# Patient Record
Sex: FEMALE | Race: WHITE | NOT HISPANIC OR LATINO | Employment: OTHER | URBAN - METROPOLITAN AREA
[De-identification: names, ages, dates, MRNs, and addresses within clinical notes are randomized per-mention and may not be internally consistent; named-entity substitution may affect disease eponyms.]

---

## 2019-02-19 ENCOUNTER — TRANSCRIBE ORDERS (OUTPATIENT)
Dept: ADMINISTRATIVE | Facility: HOSPITAL | Age: 64
End: 2019-02-19

## 2019-02-19 DIAGNOSIS — R10.31 ABDOMINAL PAIN, RIGHT LOWER QUADRANT: Primary | ICD-10-CM

## 2019-02-22 ENCOUNTER — HOSPITAL ENCOUNTER (OUTPATIENT)
Dept: ULTRASOUND IMAGING | Facility: HOSPITAL | Age: 64
Discharge: HOME | End: 2019-02-22
Payer: COMMERCIAL

## 2019-02-22 DIAGNOSIS — R10.31 ABDOMINAL PAIN, RIGHT LOWER QUADRANT: ICD-10-CM

## 2019-02-22 PROCEDURE — 76830 TRANSVAGINAL US NON-OB: CPT

## 2019-02-22 PROCEDURE — 76856 US EXAM PELVIC COMPLETE: CPT

## 2019-05-14 ENCOUNTER — OFFICE VISIT (OUTPATIENT)
Dept: GASTROENTEROLOGY | Facility: CLINIC | Age: 64
End: 2019-05-14
Payer: COMMERCIAL

## 2019-05-14 VITALS
HEART RATE: 82 BPM | HEIGHT: 68 IN | WEIGHT: 210 LBS | DIASTOLIC BLOOD PRESSURE: 84 MMHG | BODY MASS INDEX: 31.83 KG/M2 | SYSTOLIC BLOOD PRESSURE: 130 MMHG

## 2019-05-14 DIAGNOSIS — K22.70 BARRETT'S ESOPHAGUS WITHOUT DYSPLASIA: ICD-10-CM

## 2019-05-14 DIAGNOSIS — Z86.010 HISTORY OF COLONIC POLYPS: Primary | ICD-10-CM

## 2019-05-14 DIAGNOSIS — K21.9 GASTROESOPHAGEAL REFLUX DISEASE WITHOUT ESOPHAGITIS: ICD-10-CM

## 2019-05-14 DIAGNOSIS — Z86.010 PERSONAL HISTORY OF COLONIC POLYPS: ICD-10-CM

## 2019-05-14 DIAGNOSIS — K74.3 PRIMARY BILIARY CIRRHOSIS (HCC): Primary | ICD-10-CM

## 2019-05-14 PROBLEM — Z86.0100 PERSONAL HISTORY OF COLONIC POLYPS: Status: ACTIVE | Noted: 2019-05-14

## 2019-05-14 PROCEDURE — 99204 OFFICE O/P NEW MOD 45 MIN: CPT | Performed by: INTERNAL MEDICINE

## 2019-05-14 RX ORDER — URSODIOL 500 MG/1
TABLET, FILM COATED ORAL
COMMUNITY
Start: 2019-05-09 | End: 2019-05-14 | Stop reason: SDUPTHER

## 2019-05-14 RX ORDER — OMEPRAZOLE 20 MG/1
20 CAPSULE, DELAYED RELEASE ORAL DAILY
Qty: 30 CAPSULE | Refills: 12 | Status: SHIPPED | OUTPATIENT
Start: 2019-05-14 | End: 2019-06-24

## 2019-05-14 RX ORDER — OMEPRAZOLE 20 MG/1
20 CAPSULE, DELAYED RELEASE ORAL DAILY
Refills: 6 | COMMUNITY
Start: 2019-05-01 | End: 2019-05-14 | Stop reason: SDUPTHER

## 2019-05-14 RX ORDER — URSODIOL 500 MG/1
1000 TABLET, FILM COATED ORAL 2 TIMES DAILY
Qty: 120 TABLET | Refills: 12 | Status: SHIPPED | OUTPATIENT
Start: 2019-05-14 | End: 2020-08-26 | Stop reason: SDUPTHER

## 2019-05-14 RX ORDER — LOSARTAN POTASSIUM AND HYDROCHLOROTHIAZIDE 25; 100 MG/1; MG/1
1 TABLET ORAL DAILY
Refills: 0 | COMMUNITY
Start: 2019-05-01 | End: 2020-12-18

## 2019-05-15 ENCOUNTER — LAB (OUTPATIENT)
Dept: LAB | Facility: HOSPITAL | Age: 64
End: 2019-05-15
Attending: INTERNAL MEDICINE
Payer: COMMERCIAL

## 2019-05-15 DIAGNOSIS — K74.3 PRIMARY BILIARY CIRRHOSIS (HCC): ICD-10-CM

## 2019-05-15 LAB
ALBUMIN SERPL BCP-MCNC: 3.5 G/DL (ref 3.5–5)
ALP SERPL-CCNC: 131 U/L (ref 46–116)
ALT SERPL W P-5'-P-CCNC: 26 U/L (ref 12–78)
ANION GAP SERPL CALCULATED.3IONS-SCNC: 8 MMOL/L (ref 4–13)
AST SERPL W P-5'-P-CCNC: 17 U/L (ref 5–45)
BASOPHILS # BLD AUTO: 0.05 THOUSANDS/ΜL (ref 0–0.1)
BASOPHILS NFR BLD AUTO: 1 % (ref 0–1)
BILIRUB SERPL-MCNC: 0.5 MG/DL (ref 0.2–1)
BUN SERPL-MCNC: 15 MG/DL (ref 5–25)
CALCIUM SERPL-MCNC: 9.3 MG/DL (ref 8.3–10.1)
CHLORIDE SERPL-SCNC: 100 MMOL/L (ref 100–108)
CO2 SERPL-SCNC: 28 MMOL/L (ref 21–32)
CREAT SERPL-MCNC: 0.68 MG/DL (ref 0.6–1.3)
EOSINOPHIL # BLD AUTO: 0.1 THOUSAND/ΜL (ref 0–0.61)
EOSINOPHIL NFR BLD AUTO: 1 % (ref 0–6)
ERYTHROCYTE [DISTWIDTH] IN BLOOD BY AUTOMATED COUNT: 12.9 % (ref 11.6–15.1)
GFR SERPL CREATININE-BSD FRML MDRD: 93 ML/MIN/1.73SQ M
GLUCOSE SERPL-MCNC: 95 MG/DL (ref 65–140)
HCT VFR BLD AUTO: 39 % (ref 34.8–46.1)
HGB BLD-MCNC: 13.1 G/DL (ref 11.5–15.4)
IMM GRANULOCYTES # BLD AUTO: 0.04 THOUSAND/UL (ref 0–0.2)
IMM GRANULOCYTES NFR BLD AUTO: 1 % (ref 0–2)
INR PPP: 1.01 (ref 0.86–1.17)
LYMPHOCYTES # BLD AUTO: 2.5 THOUSANDS/ΜL (ref 0.6–4.47)
LYMPHOCYTES NFR BLD AUTO: 36 % (ref 14–44)
MCH RBC QN AUTO: 31.2 PG (ref 26.8–34.3)
MCHC RBC AUTO-ENTMCNC: 33.6 G/DL (ref 31.4–37.4)
MCV RBC AUTO: 93 FL (ref 82–98)
MONOCYTES # BLD AUTO: 0.57 THOUSAND/ΜL (ref 0.17–1.22)
MONOCYTES NFR BLD AUTO: 8 % (ref 4–12)
NEUTROPHILS # BLD AUTO: 3.65 THOUSANDS/ΜL (ref 1.85–7.62)
NEUTS SEG NFR BLD AUTO: 53 % (ref 43–75)
NRBC BLD AUTO-RTO: 0 /100 WBCS
PLATELET # BLD AUTO: 311 THOUSANDS/UL (ref 149–390)
PMV BLD AUTO: 11 FL (ref 8.9–12.7)
POTASSIUM SERPL-SCNC: 3.7 MMOL/L (ref 3.5–5.3)
PROT SERPL-MCNC: 8.1 G/DL (ref 6.4–8.2)
PROTHROMBIN TIME: 12.7 SECONDS (ref 11.8–14.2)
RBC # BLD AUTO: 4.2 MILLION/UL (ref 3.81–5.12)
SODIUM SERPL-SCNC: 136 MMOL/L (ref 136–145)
WBC # BLD AUTO: 6.91 THOUSAND/UL (ref 4.31–10.16)

## 2019-05-15 PROCEDURE — 80053 COMPREHEN METABOLIC PANEL: CPT

## 2019-05-15 PROCEDURE — 36415 COLL VENOUS BLD VENIPUNCTURE: CPT

## 2019-05-15 PROCEDURE — 85610 PROTHROMBIN TIME: CPT

## 2019-05-15 PROCEDURE — 85025 COMPLETE CBC W/AUTO DIFF WBC: CPT

## 2019-06-24 ENCOUNTER — TELEPHONE (OUTPATIENT)
Dept: GASTROENTEROLOGY | Facility: CLINIC | Age: 64
End: 2019-06-24

## 2019-06-24 DIAGNOSIS — K22.70 BARRETT'S ESOPHAGUS WITHOUT DYSPLASIA: Primary | ICD-10-CM

## 2019-06-24 RX ORDER — PANTOPRAZOLE SODIUM 40 MG/1
40 TABLET, DELAYED RELEASE ORAL DAILY
Qty: 90 TABLET | Refills: 12 | Status: SHIPPED | OUTPATIENT
Start: 2019-06-24 | End: 2020-07-08

## 2020-06-29 DIAGNOSIS — K22.70 BARRETT'S ESOPHAGUS WITHOUT DYSPLASIA: ICD-10-CM

## 2020-07-07 ENCOUNTER — TRANSCRIBE ORDERS (OUTPATIENT)
Dept: ADMINISTRATIVE | Facility: HOSPITAL | Age: 65
End: 2020-07-07

## 2020-07-07 ENCOUNTER — APPOINTMENT (OUTPATIENT)
Dept: LAB | Facility: HOSPITAL | Age: 65
End: 2020-07-07
Payer: COMMERCIAL

## 2020-07-07 DIAGNOSIS — Z00.00 ROUTINE GENERAL MEDICAL EXAMINATION AT A HEALTH CARE FACILITY: Primary | ICD-10-CM

## 2020-07-07 DIAGNOSIS — Z00.00 ROUTINE GENERAL MEDICAL EXAMINATION AT A HEALTH CARE FACILITY: ICD-10-CM

## 2020-07-07 DIAGNOSIS — Z12.31 OTHER SCREENING MAMMOGRAM: ICD-10-CM

## 2020-07-07 LAB
25(OH)D3 SERPL-MCNC: 24.6 NG/ML (ref 30–100)
ALBUMIN SERPL BCP-MCNC: 3.8 G/DL (ref 3.5–5)
ALP SERPL-CCNC: 124 U/L (ref 46–116)
ALT SERPL W P-5'-P-CCNC: 22 U/L (ref 12–78)
ANION GAP SERPL CALCULATED.3IONS-SCNC: 4 MMOL/L (ref 4–13)
AST SERPL W P-5'-P-CCNC: 14 U/L (ref 5–45)
BASOPHILS # BLD AUTO: 0.06 THOUSANDS/ΜL (ref 0–0.1)
BASOPHILS NFR BLD AUTO: 1 % (ref 0–1)
BILIRUB SERPL-MCNC: 0.89 MG/DL (ref 0.2–1)
BUN SERPL-MCNC: 12 MG/DL (ref 5–25)
CALCIUM SERPL-MCNC: 9.7 MG/DL (ref 8.3–10.1)
CHLORIDE SERPL-SCNC: 105 MMOL/L (ref 100–108)
CHOLEST SERPL-MCNC: 206 MG/DL (ref 50–200)
CO2 SERPL-SCNC: 28 MMOL/L (ref 21–32)
CREAT SERPL-MCNC: 0.75 MG/DL (ref 0.6–1.3)
EOSINOPHIL # BLD AUTO: 0.08 THOUSAND/ΜL (ref 0–0.61)
EOSINOPHIL NFR BLD AUTO: 1 % (ref 0–6)
ERYTHROCYTE [DISTWIDTH] IN BLOOD BY AUTOMATED COUNT: 13.1 % (ref 11.6–15.1)
GFR SERPL CREATININE-BSD FRML MDRD: 84 ML/MIN/1.73SQ M
GLUCOSE P FAST SERPL-MCNC: 100 MG/DL (ref 65–99)
HCT VFR BLD AUTO: 40.8 % (ref 34.8–46.1)
HDLC SERPL-MCNC: 67 MG/DL
HGB BLD-MCNC: 13.7 G/DL (ref 11.5–15.4)
IMM GRANULOCYTES # BLD AUTO: 0.05 THOUSAND/UL (ref 0–0.2)
IMM GRANULOCYTES NFR BLD AUTO: 1 % (ref 0–2)
LDLC SERPL CALC-MCNC: 114 MG/DL (ref 0–100)
LYMPHOCYTES # BLD AUTO: 1.9 THOUSANDS/ΜL (ref 0.6–4.47)
LYMPHOCYTES NFR BLD AUTO: 33 % (ref 14–44)
MCH RBC QN AUTO: 31.5 PG (ref 26.8–34.3)
MCHC RBC AUTO-ENTMCNC: 33.6 G/DL (ref 31.4–37.4)
MCV RBC AUTO: 94 FL (ref 82–98)
MONOCYTES # BLD AUTO: 0.43 THOUSAND/ΜL (ref 0.17–1.22)
MONOCYTES NFR BLD AUTO: 8 % (ref 4–12)
NEUTROPHILS # BLD AUTO: 3.18 THOUSANDS/ΜL (ref 1.85–7.62)
NEUTS SEG NFR BLD AUTO: 56 % (ref 43–75)
NONHDLC SERPL-MCNC: 139 MG/DL
NRBC BLD AUTO-RTO: 0 /100 WBCS
PLATELET # BLD AUTO: 325 THOUSANDS/UL (ref 149–390)
PMV BLD AUTO: 11.1 FL (ref 8.9–12.7)
POTASSIUM SERPL-SCNC: 3.8 MMOL/L (ref 3.5–5.3)
PROT SERPL-MCNC: 8.4 G/DL (ref 6.4–8.2)
RBC # BLD AUTO: 4.35 MILLION/UL (ref 3.81–5.12)
SODIUM SERPL-SCNC: 137 MMOL/L (ref 136–145)
TRIGL SERPL-MCNC: 124 MG/DL
WBC # BLD AUTO: 5.7 THOUSAND/UL (ref 4.31–10.16)

## 2020-07-07 PROCEDURE — 80053 COMPREHEN METABOLIC PANEL: CPT

## 2020-07-07 PROCEDURE — 82306 VITAMIN D 25 HYDROXY: CPT

## 2020-07-07 PROCEDURE — 36415 COLL VENOUS BLD VENIPUNCTURE: CPT

## 2020-07-07 PROCEDURE — 80061 LIPID PANEL: CPT

## 2020-07-07 PROCEDURE — 85025 COMPLETE CBC W/AUTO DIFF WBC: CPT

## 2020-07-08 NOTE — TELEPHONE ENCOUNTER
Patient was due for OV 3 months after EGD in 2019  Please contact her to schedule appt  Refill was given for six months

## 2020-07-09 RX ORDER — PANTOPRAZOLE SODIUM 40 MG/1
TABLET, DELAYED RELEASE ORAL
Qty: 30 TABLET | Refills: 5 | Status: SHIPPED | OUTPATIENT
Start: 2020-07-09 | End: 2020-08-26 | Stop reason: SDUPTHER

## 2020-08-25 ENCOUNTER — HOSPITAL ENCOUNTER (OUTPATIENT)
Dept: BONE DENSITY | Facility: IMAGING CENTER | Age: 65
Discharge: HOME | End: 2020-08-25
Payer: COMMERCIAL

## 2020-08-25 VITALS — BODY MASS INDEX: 29.1 KG/M2 | WEIGHT: 192 LBS | HEIGHT: 68 IN

## 2020-08-25 DIAGNOSIS — Z12.31 OTHER SCREENING MAMMOGRAM: ICD-10-CM

## 2020-08-25 PROCEDURE — 77063 BREAST TOMOSYNTHESIS BI: CPT

## 2020-08-25 PROCEDURE — 77067 SCR MAMMO BI INCL CAD: CPT

## 2020-08-26 ENCOUNTER — OFFICE VISIT (OUTPATIENT)
Dept: GASTROENTEROLOGY | Facility: CLINIC | Age: 65
End: 2020-08-26
Payer: COMMERCIAL

## 2020-08-26 VITALS
DIASTOLIC BLOOD PRESSURE: 90 MMHG | TEMPERATURE: 97.7 F | SYSTOLIC BLOOD PRESSURE: 160 MMHG | HEIGHT: 67 IN | BODY MASS INDEX: 30.13 KG/M2 | WEIGHT: 192 LBS

## 2020-08-26 DIAGNOSIS — K74.3 PRIMARY BILIARY CIRRHOSIS (HCC): Primary | ICD-10-CM

## 2020-08-26 DIAGNOSIS — K22.70 BARRETT'S ESOPHAGUS WITHOUT DYSPLASIA: ICD-10-CM

## 2020-08-26 DIAGNOSIS — Z86.010 PERSONAL HISTORY OF COLONIC POLYPS: ICD-10-CM

## 2020-08-26 PROCEDURE — 99214 OFFICE O/P EST MOD 30 MIN: CPT | Performed by: NURSE PRACTITIONER

## 2020-08-26 RX ORDER — URSODIOL 500 MG/1
1000 TABLET, FILM COATED ORAL 2 TIMES DAILY
Qty: 120 TABLET | Refills: 11 | Status: SHIPPED | OUTPATIENT
Start: 2020-08-26 | End: 2021-02-15

## 2020-08-26 RX ORDER — DESVENLAFAXINE 50 MG/1
50 TABLET, EXTENDED RELEASE ORAL DAILY
COMMUNITY

## 2020-08-26 RX ORDER — PANTOPRAZOLE SODIUM 40 MG/1
40 TABLET, DELAYED RELEASE ORAL DAILY
Qty: 30 TABLET | Refills: 11 | Status: SHIPPED | OUTPATIENT
Start: 2020-08-26 | End: 2021-09-02

## 2020-08-26 RX ORDER — IRBESARTAN 150 MG/1
150 TABLET ORAL
COMMUNITY
End: 2022-06-24

## 2020-08-26 NOTE — PROGRESS NOTES
[address] [place] [facility] - Outpatient Follow-up Note  Tabitha Mustafa 76 y o  female MRN: [id number]  Encounter: [id number]    ASSESSMENT AND PLAN:      1  Primary biliary cirrhosis ([address] )  2  Primary biliary cholangitis  Diagnosed 3 years ago when she was residing [place] and  found to have elevated LFTs  In addition to a positive antimitochondrial antibody  Never underwent a liver biopsy  Has been maintained on Actigall since that time  Follows with Dr Caleb Matthews  Most recent blood work was negative with the exception of a mildly elevated alk phosphatase of 124     - ursodiol (ACTIGALL) 500 MG tablet; Take 2 tablets (1,000 mg total) by mouth 2 (two) times a day    Dispense: 120 tablet; Refill: 12  -  Blood work every 6 months  - CBC and differential; Future  - Comprehensive metabolic panel; Future  - Protime-INR; Future  -  Check hepatic ultrasound    3  Morales's esophagus without dysplasia    Up-to-date with surveillance upper endoscopy for history of Morales's esophagus  Last upper endoscopy performed in 2019  Showed the absence of Morales's esophagus  A 3 year recall advised  -  Continue Protonix 40 mg daily    4  Personal history of colonic polyps  5  Colon cancer screening   Increased risk  Up-to-date with surveillance colonoscopy  Last colonoscopy performed in June of 2019 showed 2 adenomatous colon polyps  A 5 year recall advised  Followup Appointment: 1 year  ______________________________________________________________________    Chief Complaint   Patient presents with    Follow-up     needs refill of Ursodial     HPI:      Follow-up to the office regarding the diagnosis of primary biliary cirrhosis and Morales's esophagus  Appetite good  She has lost a few lb intentionally over the past several months  Denies any heartburn on Protonix 40 mg daily    Denies any early satiety, abdominal pain, dysphagia, odynophagia, change in bowel habits, melena, rectal bleeding, fevers or chills  Further denies any skin rashes or lesions, arthralgias or myalgias  Historical Information   Past Medical History:   Diagnosis Date    Morales esophagus     Gastroesophageal reflux disease without esophagitis 5/14/2019    GERD (gastroesophageal reflux disease)     Hypertension     Personal history of colonic polyps 5/14/2019    Primary biliary cirrhosis ([facility] ) 5/14/2019     Past Surgical History:   Procedure Laterality Date    APPENDECTOMY      COLONOSCOPY  06/24/2020     2 adenomas  Five year recall advised    UPPER GASTROINTESTINAL ENDOSCOPY  06/24/2019    irregular z-line    biopsies negative for Morales's     Social History     Substance and Sexual Activity   Alcohol Use Yes    Frequency: 4 or more times a week    Comment: 4 GLASSES OF WINE PER WEEK      Social History     Substance and Sexual Activity   Drug Use Not on file     Social History     Tobacco Use   Smoking Status Never Smoker   Smokeless Tobacco Never Used     Family History   Problem Relation Age of Onset    No Known Problems Mother     Cancer Father     No Known Problems Sister     No Known Problems Daughter     No Known Problems Maternal Grandmother     No Known Problems Maternal Grandfather     No Known Problems Paternal Grandmother     No Known Problems Paternal Grandfather     No Known Problems Sister     No Known Problems Maternal Aunt     No Known Problems Paternal Aunt     No Known Problems Paternal Aunt          Current Outpatient Medications:     desvenlafaxine succinate (PRISTIQ) 50 mg 24 hr tablet    irbesartan (AVAPRO) 150 mg tablet    pantoprazole (PROTONIX) 40 mg tablet    ursodiol (ACTIGALL) 500 MG tablet    losartan-hydrochlorothiazide (HYZAAR) 100-25 MG per tablet    Na Sulfate-K Sulfate-Mg Sulf (SUPREP BOWEL PREP KIT) 17 5-3 13-1 6 GM/177ML SOLN  Allergies   Allergen Reactions    Aspirin      Reviewed medications and allergies and updated as indicated    PHYSICAL EXAM: Blood pressure 160/90, temperature 97 7 °F (36 5 °C), height 5' 7" (1 702 m), weight 87 1 kg (192 lb)  Body mass index is 30 07 kg/m²  General Appearance: NAD, cooperative, alert  Eyes: Anicteric  ENT:  Normocephalic  Neck:  Appears normal  Resp:  Clear to auscultation bilaterally; no rales, rhonchi or wheezing; respirations unlabored   CV:  S1 S2, Regular rate and rhythm; no murmur, rub, or gallop  GI:  Soft, non-tender, non-distended; normal bowel sounds; no masses, no organomegaly   Rectal: Deferred  Musculoskeletal: No cyanosis, clubbing or edema  Normal ROM  Skin:  No jaundice, rashes, or lesions   Psych: Normal affect, good eye contact  Neuro: No gross deficits, AAOx3    Lab Results:   Lab Results   Component Value Date    WBC 5 70 07/07/2020    HGB 13 7 07/07/2020    HCT 40 8 07/07/2020    MCV 94 07/07/2020     07/07/2020     Lab Results   Component Value Date    K 3 8 07/07/2020     07/07/2020    CO2 28 07/07/2020    BUN 12 07/07/2020    CREATININE 0 75 07/07/2020    GLUF 100 (H) 07/07/2020    CALCIUM 9 7 07/07/2020    AST 14 07/07/2020    ALT 22 07/07/2020    ALKPHOS 124 (H) 07/07/2020    EGFR 84 07/07/2020     No results found for: IRON, TIBC, FERRITIN  No results found for: LIPASE    Radiology Results:   No results found

## 2020-09-09 ENCOUNTER — HOSPITAL ENCOUNTER (OUTPATIENT)
Dept: MAMMOGRAPHY | Facility: CLINIC | Age: 65
Discharge: HOME | End: 2020-09-09
Payer: COMMERCIAL

## 2020-09-09 ENCOUNTER — HOSPITAL ENCOUNTER (OUTPATIENT)
Dept: ULTRASOUND IMAGING | Facility: CLINIC | Age: 65
Discharge: HOME | End: 2020-09-09
Payer: COMMERCIAL

## 2020-09-09 VITALS — HEIGHT: 67 IN | TEMPERATURE: 97.3 F | WEIGHT: 192 LBS | BODY MASS INDEX: 30.13 KG/M2

## 2020-09-09 DIAGNOSIS — R92.8 ABNORMAL SCREENING MAMMOGRAM: ICD-10-CM

## 2020-09-09 PROCEDURE — G0279 TOMOSYNTHESIS, MAMMO: HCPCS

## 2020-09-09 PROCEDURE — 77065 DX MAMMO INCL CAD UNI: CPT

## 2020-09-09 PROCEDURE — 76642 ULTRASOUND BREAST LIMITED: CPT

## 2020-09-09 NOTE — PROGRESS NOTES
Met with patient and Dr Evi Jay               regarding recommendation for;      __x___ RIGHT ______LEFT      _____Ultrasound guided  __x____Stereotactic  Breast biopsy  ___x__Verbalized understanding  Blood thinners:  _____yes ___x__no    Date stopped: ___x________    Biopsy teaching sheet given:  ____x___yes ______no    Kyaw Tiffanie has history of GERD, HTN, Primary biliary cirrhosis  Consent form reviewed

## 2020-09-24 ENCOUNTER — HOSPITAL ENCOUNTER (OUTPATIENT)
Dept: MAMMOGRAPHY | Facility: CLINIC | Age: 65
Discharge: HOME | End: 2020-09-24
Payer: COMMERCIAL

## 2020-09-24 VITALS — HEART RATE: 75 BPM | SYSTOLIC BLOOD PRESSURE: 141 MMHG | TEMPERATURE: 96.4 F | DIASTOLIC BLOOD PRESSURE: 82 MMHG

## 2020-09-24 DIAGNOSIS — Z98.890 STATUS POST BREAST BIOPSY: ICD-10-CM

## 2020-09-24 DIAGNOSIS — R92.8 ABNORMAL MAMMOGRAM: ICD-10-CM

## 2020-09-24 PROCEDURE — 88342 IMHCHEM/IMCYTCHM 1ST ANTB: CPT | Performed by: PATHOLOGY

## 2020-09-24 PROCEDURE — 88361 TUMOR IMMUNOHISTOCHEM/COMPUT: CPT | Performed by: PATHOLOGY

## 2020-09-24 PROCEDURE — 88305 TISSUE EXAM BY PATHOLOGIST: CPT | Performed by: PATHOLOGY

## 2020-09-24 PROCEDURE — 19081 BX BREAST 1ST LESION STRTCTC: CPT

## 2020-09-24 RX ORDER — LIDOCAINE HYDROCHLORIDE AND EPINEPHRINE BITARTRATE 20; .01 MG/ML; MG/ML
10 INJECTION, SOLUTION SUBCUTANEOUS ONCE
Status: COMPLETED | OUTPATIENT
Start: 2020-09-24 | End: 2020-09-24

## 2020-09-24 RX ORDER — LIDOCAINE HYDROCHLORIDE 10 MG/ML
5 INJECTION, SOLUTION EPIDURAL; INFILTRATION; INTRACAUDAL; PERINEURAL ONCE
Status: COMPLETED | OUTPATIENT
Start: 2020-09-24 | End: 2020-09-24

## 2020-09-24 RX ADMIN — LIDOCAINE HYDROCHLORIDE AND EPINEPHRINE 10 ML: 20; 10 INJECTION, SOLUTION INFILTRATION; PERINEURAL at 13:34

## 2020-09-24 RX ADMIN — LIDOCAINE HYDROCHLORIDE 5 ML: 10 INJECTION, SOLUTION EPIDURAL; INFILTRATION; INTRACAUDAL; PERINEURAL at 13:31

## 2020-09-24 NOTE — PROGRESS NOTES
Ice pack given:    __x___yes _____no    Discharge instructions signed by patient:    __x___yes _____no    Discharge instructions given to patient:    ___x__yes _____no    Discharged via:    _x____ambulatory    _____wheelchair    _____stretcher    Stable on discharge:    __x___yes ____no

## 2020-09-24 NOTE — PROGRESS NOTES
Procedure type:    _____ultrasound guided __x___stereotactic    Breast:    _____Left ___x__Right    Location: 10 oclcock    Needle: 8g Jeniffer    # of passes: 3 cores (all with calcs)    Clip: Mammomark Triple Twist    Performed by: Dr Luis Hua held for 5 minutes by: Marilia Portillo    Steri Strips:    __x___yes _____no    Band aid:    __x___yes_____no    Tape and guaze:    _____yes _x____no    Tolerated procedure:    __x___yes _____no

## 2020-09-24 NOTE — DISCHARGE INSTR - OTHER ORDERS
POST LARGE CORE BREAST BIOPSY PATIENT INFORMATION      1  Place an ice pack inside your bra over the top of the dressing every hour for 20 minutes (20 minutes on, 60 minutes off)  Do this until bedtime  2  Do not shower or bathe until the following morning  3  You may bathe your breast carefully with the steri-strips in place  Be careful    Not to loosen them  The steri-strips will fall off in 3-5 days  4  You may have mild discomfort, and you may have some bruising where the   Needle entered the skin  This should clear within 5-7 days  5  If you need medicine for discomfort, take acetaminophen products such as   Tylenol  You may also take Advil or Motrin products  6  Do not participate in strenuous activities such as-tennis, aerobics, skiing,  Weight lifting, etc  for 24 hours  Refrain from swimming/soaking for 72 hours  7  Wearing a bra for sleeping may be more comfortable for the first 24-48 hours  8  Watch for continued bleeding, pain or fever over 101; please call with any questions or concerns  For procedures done at the [facility] [address] call:  Aravind Li RN at [facility] RN at [phone]                    *After 4 PM call the Interventional Radiology Department                    [phone] and ask to speak with the nurse on call  For procedures done at the [address] [place] [address] call:         Ray Mosley RN at   *After 4 PM call the Interventional Radiology Department   [phone] and ask to speak with the nurse on call  For procedures done at [address] [place] [address] call: The Radiology Nurse at [phone]  *After 4 PM call your physician, or go to the Emergency Department  9          The final results of your biopsy are usually available within one week

## 2020-09-25 NOTE — PROGRESS NOTES
Post procedure call completed 9/25/20 at 1103    Bleeding: _____yes __X___no    Pain: _____yes ___X___no (minimal discomfort)    Redness/Swelling: ______yes ___X___no    Band aid removed: __X___yes _____no    Steri-Strips intact: ___X___yes _____no    Pt with no questions at this time, adv will call when results available, adv to call with any questions or concerns, has name/# for contact

## 2020-09-29 ENCOUNTER — TELEPHONE (OUTPATIENT)
Dept: MAMMOGRAPHY | Facility: CLINIC | Age: 65
End: 2020-09-29

## 2020-09-29 NOTE — TELEPHONE ENCOUNTER
Call placed to patient after pt given biopsy results from radiologist, questions answered with support of Carissa Ferguson RN, adv next step is to set patient up with surgeon, options discussed and patient would like to research and will call when determined  Pt aware of additional ultrasound request and potential biopsy  Scheduled for Tuesday October 6th at 10am for ultrasound and additional time blocked if needed for biopsy  Pt aware

## 2020-09-30 ENCOUNTER — TELEPHONE (OUTPATIENT)
Dept: SURGICAL ONCOLOGY | Facility: CLINIC | Age: 65
End: 2020-09-30

## 2020-09-30 ENCOUNTER — TELEPHONE (OUTPATIENT)
Dept: MAMMOGRAPHY | Facility: CLINIC | Age: 65
End: 2020-09-30

## 2020-10-05 ENCOUNTER — DOCUMENTATION (OUTPATIENT)
Dept: HEMATOLOGY ONCOLOGY | Facility: CLINIC | Age: 65
End: 2020-10-05

## 2020-10-06 ENCOUNTER — HOSPITAL ENCOUNTER (OUTPATIENT)
Dept: ULTRASOUND IMAGING | Facility: CLINIC | Age: 65
Discharge: HOME | End: 2020-10-06

## 2020-10-06 ENCOUNTER — HOSPITAL ENCOUNTER (OUTPATIENT)
Dept: ULTRASOUND IMAGING | Facility: CLINIC | Age: 65
Discharge: HOME | End: 2020-10-06
Payer: COMMERCIAL

## 2020-10-06 VITALS — HEIGHT: 67 IN | BODY MASS INDEX: 30.13 KG/M2 | TEMPERATURE: 96.9 F | WEIGHT: 192 LBS

## 2020-10-06 DIAGNOSIS — R92.8 ABNORMAL MAMMOGRAM: ICD-10-CM

## 2020-10-06 PROCEDURE — 76642 ULTRASOUND BREAST LIMITED: CPT

## 2020-10-06 RX ORDER — LIDOCAINE HYDROCHLORIDE 10 MG/ML
5 INJECTION, SOLUTION EPIDURAL; INFILTRATION; INTRACAUDAL; PERINEURAL ONCE
Status: DISCONTINUED | OUTPATIENT
Start: 2020-10-06 | End: 2020-10-07 | Stop reason: HOSPADM

## 2020-11-02 PROBLEM — C50.411 MALIGNANT NEOPLASM OF UPPER-OUTER QUADRANT OF RIGHT BREAST IN FEMALE, ESTROGEN RECEPTOR POSITIVE (HCC): Status: ACTIVE | Noted: 2020-11-02

## 2020-11-02 PROBLEM — Z17.0 MALIGNANT NEOPLASM OF UPPER-OUTER QUADRANT OF RIGHT BREAST IN FEMALE, ESTROGEN RECEPTOR POSITIVE (HCC): Status: ACTIVE | Noted: 2020-11-02

## 2020-11-03 ENCOUNTER — TELEPHONE (OUTPATIENT)
Dept: SURGICAL ONCOLOGY | Facility: CLINIC | Age: 65
End: 2020-11-03

## 2020-11-04 ENCOUNTER — CONSULT (OUTPATIENT)
Dept: SURGICAL ONCOLOGY | Facility: CLINIC | Age: 65
End: 2020-11-04
Payer: COMMERCIAL

## 2020-11-04 VITALS
RESPIRATION RATE: 16 BRPM | HEART RATE: 92 BPM | WEIGHT: 195 LBS | SYSTOLIC BLOOD PRESSURE: 144 MMHG | DIASTOLIC BLOOD PRESSURE: 90 MMHG | TEMPERATURE: 98.4 F | BODY MASS INDEX: 30.61 KG/M2 | HEIGHT: 67 IN

## 2020-11-04 DIAGNOSIS — Z17.0 MALIGNANT NEOPLASM OF UPPER-OUTER QUADRANT OF RIGHT BREAST IN FEMALE, ESTROGEN RECEPTOR POSITIVE (HCC): Primary | ICD-10-CM

## 2020-11-04 DIAGNOSIS — C50.411 MALIGNANT NEOPLASM OF UPPER-OUTER QUADRANT OF RIGHT BREAST IN FEMALE, ESTROGEN RECEPTOR POSITIVE (HCC): Primary | ICD-10-CM

## 2020-11-04 PROCEDURE — 99205 OFFICE O/P NEW HI 60 MIN: CPT | Performed by: SURGERY

## 2020-11-04 RX ORDER — LORATADINE AND PSEUDOEPHEDRINE 10; 240 MG/1; MG/1
1 TABLET, EXTENDED RELEASE ORAL AS NEEDED
COMMUNITY

## 2020-11-04 RX ORDER — AZELASTINE HYDROCHLORIDE 137 UG/1
SPRAY, METERED NASAL DAILY PRN
COMMUNITY

## 2020-11-04 RX ORDER — CHOLECALCIFEROL (VITAMIN D3) 125 MCG
CAPSULE ORAL DAILY
COMMUNITY
End: 2022-06-24

## 2020-11-05 ENCOUNTER — DOCUMENTATION (OUTPATIENT)
Dept: HEMATOLOGY ONCOLOGY | Facility: CLINIC | Age: 65
End: 2020-11-05

## 2020-11-09 ENCOUNTER — PATIENT OUTREACH (OUTPATIENT)
Dept: CASE MANAGEMENT | Facility: HOSPITAL | Age: 65
End: 2020-11-09

## 2020-11-10 ENCOUNTER — CLINICAL SUPPORT (OUTPATIENT)
Dept: RADIATION ONCOLOGY | Facility: HOSPITAL | Age: 65
End: 2020-11-10
Attending: RADIOLOGY
Payer: COMMERCIAL

## 2020-11-10 VITALS
DIASTOLIC BLOOD PRESSURE: 71 MMHG | RESPIRATION RATE: 16 BRPM | BODY MASS INDEX: 30.35 KG/M2 | WEIGHT: 193.4 LBS | OXYGEN SATURATION: 96 % | SYSTOLIC BLOOD PRESSURE: 128 MMHG | HEIGHT: 67 IN | TEMPERATURE: 97.6 F | HEART RATE: 86 BPM

## 2020-11-10 DIAGNOSIS — Z17.0 MALIGNANT NEOPLASM OF UPPER-OUTER QUADRANT OF RIGHT BREAST IN FEMALE, ESTROGEN RECEPTOR POSITIVE (HCC): ICD-10-CM

## 2020-11-10 DIAGNOSIS — C50.411 MALIGNANT NEOPLASM OF UPPER-OUTER QUADRANT OF RIGHT BREAST IN FEMALE, ESTROGEN RECEPTOR POSITIVE (HCC): ICD-10-CM

## 2020-11-10 DIAGNOSIS — C50.411 MALIGNANT NEOPLASM OF UPPER-OUTER QUADRANT OF RIGHT BREAST IN FEMALE, ESTROGEN RECEPTOR POSITIVE (HCC): Primary | ICD-10-CM

## 2020-11-10 DIAGNOSIS — Z17.0 MALIGNANT NEOPLASM OF UPPER-OUTER QUADRANT OF RIGHT BREAST IN FEMALE, ESTROGEN RECEPTOR POSITIVE (HCC): Primary | ICD-10-CM

## 2020-11-10 PROCEDURE — 99211 OFF/OP EST MAY X REQ PHY/QHP: CPT | Performed by: RADIOLOGY

## 2020-11-12 ENCOUNTER — DOCUMENTATION (OUTPATIENT)
Dept: HEMATOLOGY ONCOLOGY | Facility: CLINIC | Age: 65
End: 2020-11-12

## 2020-11-13 ENCOUNTER — PATIENT OUTREACH (OUTPATIENT)
Dept: SURGICAL ONCOLOGY | Facility: CLINIC | Age: 65
End: 2020-11-13

## 2020-11-17 ENCOUNTER — PATIENT OUTREACH (OUTPATIENT)
Dept: CASE MANAGEMENT | Facility: HOSPITAL | Age: 65
End: 2020-11-17

## 2020-11-18 ENCOUNTER — PATIENT OUTREACH (OUTPATIENT)
Dept: CASE MANAGEMENT | Facility: HOSPITAL | Age: 65
End: 2020-11-18

## 2020-11-23 ENCOUNTER — PATIENT OUTREACH (OUTPATIENT)
Dept: CASE MANAGEMENT | Facility: HOSPITAL | Age: 65
End: 2020-11-23

## 2020-11-30 ENCOUNTER — TELEPHONE (OUTPATIENT)
Dept: SURGICAL ONCOLOGY | Facility: CLINIC | Age: 65
End: 2020-11-30

## 2020-12-01 ENCOUNTER — PATIENT OUTREACH (OUTPATIENT)
Dept: SURGICAL ONCOLOGY | Facility: CLINIC | Age: 65
End: 2020-12-01

## 2020-12-03 ENCOUNTER — HOSPITAL ENCOUNTER (OUTPATIENT)
Dept: RADIOLOGY | Facility: HOSPITAL | Age: 65
Discharge: HOME | End: 2020-12-03
Attending: SURGERY
Payer: COMMERCIAL

## 2020-12-03 ENCOUNTER — LAB (OUTPATIENT)
Dept: LAB | Facility: CLINIC | Age: 65
End: 2020-12-03
Payer: COMMERCIAL

## 2020-12-03 ENCOUNTER — OFFICE VISIT (OUTPATIENT)
Dept: SURGICAL ONCOLOGY | Facility: CLINIC | Age: 65
End: 2020-12-03
Payer: COMMERCIAL

## 2020-12-03 VITALS
WEIGHT: 191 LBS | RESPIRATION RATE: 16 BRPM | HEIGHT: 67 IN | HEART RATE: 72 BPM | SYSTOLIC BLOOD PRESSURE: 120 MMHG | DIASTOLIC BLOOD PRESSURE: 70 MMHG | BODY MASS INDEX: 29.98 KG/M2 | TEMPERATURE: 97.6 F

## 2020-12-03 DIAGNOSIS — Z01.818 PREOPERATIVE TESTING: ICD-10-CM

## 2020-12-03 DIAGNOSIS — C50.411 MALIGNANT NEOPLASM OF UPPER-OUTER QUADRANT OF RIGHT BREAST IN FEMALE, ESTROGEN RECEPTOR POSITIVE (HCC): ICD-10-CM

## 2020-12-03 DIAGNOSIS — Z17.0 MALIGNANT NEOPLASM OF UPPER-OUTER QUADRANT OF RIGHT BREAST IN FEMALE, ESTROGEN RECEPTOR POSITIVE (HCC): ICD-10-CM

## 2020-12-03 DIAGNOSIS — Z01.818 PREOP EXAMINATION: Primary | ICD-10-CM

## 2020-12-03 LAB
ALBUMIN SERPL BCP-MCNC: 3.8 G/DL (ref 3.5–5)
ALP SERPL-CCNC: 110 U/L (ref 46–116)
ALT SERPL W P-5'-P-CCNC: 27 U/L (ref 12–78)
ANION GAP SERPL CALCULATED.3IONS-SCNC: 9 MMOL/L (ref 4–13)
AST SERPL W P-5'-P-CCNC: 16 U/L (ref 5–45)
ATRIAL RATE: 64 BPM
BASOPHILS # BLD AUTO: 0.06 THOUSANDS/ΜL (ref 0–0.1)
BASOPHILS NFR BLD AUTO: 1 % (ref 0–1)
BILIRUB SERPL-MCNC: 0.55 MG/DL (ref 0.2–1)
BUN SERPL-MCNC: 10 MG/DL (ref 5–25)
CALCIUM SERPL-MCNC: 9.2 MG/DL (ref 8.3–10.1)
CHLORIDE SERPL-SCNC: 101 MMOL/L (ref 100–108)
CO2 SERPL-SCNC: 26 MMOL/L (ref 21–32)
CREAT SERPL-MCNC: 0.72 MG/DL (ref 0.6–1.3)
EOSINOPHIL # BLD AUTO: 0.1 THOUSAND/ΜL (ref 0–0.61)
EOSINOPHIL NFR BLD AUTO: 2 % (ref 0–6)
ERYTHROCYTE [DISTWIDTH] IN BLOOD BY AUTOMATED COUNT: 12.7 % (ref 11.6–15.1)
GFR SERPL CREATININE-BSD FRML MDRD: 88 ML/MIN/1.73SQ M
GLUCOSE SERPL-MCNC: 99 MG/DL (ref 65–140)
HCT VFR BLD AUTO: 39.7 % (ref 34.8–46.1)
HGB BLD-MCNC: 13.1 G/DL (ref 11.5–15.4)
IMM GRANULOCYTES # BLD AUTO: 0.04 THOUSAND/UL (ref 0–0.2)
IMM GRANULOCYTES NFR BLD AUTO: 1 % (ref 0–2)
LYMPHOCYTES # BLD AUTO: 2.38 THOUSANDS/ΜL (ref 0.6–4.47)
LYMPHOCYTES NFR BLD AUTO: 35 % (ref 14–44)
MCH RBC QN AUTO: 31.3 PG (ref 26.8–34.3)
MCHC RBC AUTO-ENTMCNC: 33 G/DL (ref 31.4–37.4)
MCV RBC AUTO: 95 FL (ref 82–98)
MONOCYTES # BLD AUTO: 0.48 THOUSAND/ΜL (ref 0.17–1.22)
MONOCYTES NFR BLD AUTO: 7 % (ref 4–12)
NEUTROPHILS # BLD AUTO: 3.75 THOUSANDS/ΜL (ref 1.85–7.62)
NEUTS SEG NFR BLD AUTO: 54 % (ref 43–75)
NRBC BLD AUTO-RTO: 0 /100 WBCS
P AXIS: 50 DEGREES
PLATELET # BLD AUTO: 302 THOUSANDS/UL (ref 149–390)
PMV BLD AUTO: 10.5 FL (ref 8.9–12.7)
POTASSIUM SERPL-SCNC: 3.8 MMOL/L (ref 3.5–5.3)
PR INTERVAL: 140 MS
PROT SERPL-MCNC: 8 G/DL (ref 6.4–8.2)
QRS AXIS: 31 DEGREES
QRSD INTERVAL: 84 MS
QT INTERVAL: 414 MS
QTC INTERVAL: 427 MS
RBC # BLD AUTO: 4.18 MILLION/UL (ref 3.81–5.12)
SODIUM SERPL-SCNC: 136 MMOL/L (ref 136–145)
T WAVE AXIS: 48 DEGREES
VENTRICULAR RATE: 64 BPM
WBC # BLD AUTO: 6.81 THOUSAND/UL (ref 4.31–10.16)

## 2020-12-03 PROCEDURE — 93010 ELECTROCARDIOGRAM REPORT: CPT | Performed by: INTERNAL MEDICINE

## 2020-12-03 PROCEDURE — 71046 X-RAY EXAM CHEST 2 VIEWS: CPT

## 2020-12-03 PROCEDURE — 99214 OFFICE O/P EST MOD 30 MIN: CPT | Performed by: SURGERY

## 2020-12-03 PROCEDURE — 85025 COMPLETE CBC W/AUTO DIFF WBC: CPT

## 2020-12-03 PROCEDURE — 93005 ELECTROCARDIOGRAM TRACING: CPT

## 2020-12-03 PROCEDURE — 80053 COMPREHEN METABOLIC PANEL: CPT

## 2020-12-03 PROCEDURE — 36415 COLL VENOUS BLD VENIPUNCTURE: CPT

## 2020-12-03 RX ORDER — OXYCODONE HYDROCHLORIDE AND ACETAMINOPHEN 5; 325 MG/1; MG/1
1 TABLET ORAL EVERY 6 HOURS PRN
Qty: 6 TABLET | Refills: 0 | Status: SHIPPED | OUTPATIENT
Start: 2020-12-03 | End: 2021-01-15 | Stop reason: ALTCHOICE

## 2020-12-03 NOTE — H&P (VIEW-ONLY)
Surgical Oncology Follow Up       [address] [facility],[address] [facility]  [facility] [place]  [address] [facility] [address]  [facility] [address]    Marito Ta  1955  [id number]  [address] [facility],[address] [place] [address]  [facility] [place]  [address] [address]    Chief Complaint   Patient presents with    Pre-op Exam          Assessment & Plan:   Patient presents for a follow-up visit  She she has seen Dr Sneha Bowman who has agreed for intraoperative radiation therapy  We previously talked about a right breast needle localization lumpectomy in conjunction with sentinel lymph node biopsy and possible axillary dissection  We went through the process of informed consent for adding intraoperative radiation therapy to this procedure  I reviewed with her procedure in detail as well as her   All questions were answered to their satisfaction  Cancer History:     Oncology History   Malignant neoplasm of upper-outer quadrant of right breast in female, estrogen receptor positive ([facility] [address] )   9/24/2020 Biopsy    Right breast stereotactic biopsy  10 o'clock,   Mucinous carcinoma, 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, both 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, 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  Grade 1    IA 25  HER2 1+  Lymphovascular invasion not identified    Concordant  Malignancy appears unifocal; calcifications span 2 3cm  US right axila has not been performed  Left breast US recommended  10/6/2020 B/L US - no right axillary adenopathy, benign findings in left breast            Interval History:   Patient has no complaints referable to her breast or changes to her overall health since we saw her last     Review of Systems:   Review of Systems   All other systems reviewed and are negative        Past Medical History     Patient Active Problem List   Diagnosis    Personal history of colonic polyps    Gastroesophageal reflux disease without esophagitis    Primary biliary cirrhosis ([facility] [address] )  Morales's esophagus without dysplasia    Malignant neoplasm of upper-outer quadrant of right breast in female, estrogen receptor positive ([facility] )     Past Medical History:   Diagnosis Date    Morales esophagus     Gastroesophageal reflux disease without esophagitis 5/14/2019    GERD (gastroesophageal reflux disease)     Hypertension     Personal history of colonic polyps 5/14/2019    Primary biliary cirrhosis ([facility] ) 5/14/2019     Past Surgical History:   Procedure Laterality Date    APPENDECTOMY      COLONOSCOPY  06/24/2020     2 adenomas  Five year recall advised    MAMMO STEREOTACTIC BREAST BIOPSY RIGHT (ALL INC) Right 9/24/2020    TUBAL LIGATION      UPPER GASTROINTESTINAL ENDOSCOPY  06/24/2019    irregular z-line    biopsies negative for Morales's     Family History   Problem Relation Age of Onset    No Known Problems Mother     Lung cancer Father 48    No Known Problems Sister     No Known Problems Brother     No Known Problems Daughter     No Known Problems Maternal Grandmother     No Known Problems Maternal Grandfather     No Known Problems Paternal Grandmother     No Known Problems Paternal Grandfather     No Known Problems Sister     No Known Problems Maternal Aunt     No Known Problems Paternal Aunt     No Known Problems Paternal Aunt      Social History     Socioeconomic History    Marital status: /Civil Union     Spouse name: Not on file    Number of children: Not on file    Years of education: Not on file    Highest education level: Not on file   Occupational History    Not on file   Social Needs    Financial resource strain: Not on file    Food insecurity     Worry: Not on file     Inability: Not on file   [employer] needs     Medical: Not on file     Non-medical: Not on file   Tobacco Use    Smoking status: Never Smoker    Smokeless tobacco: Never Used   Substance and Sexual Activity    Alcohol use: Yes     Frequency: 2-3 times a week    Drug use: Never    Sexual activity: Not on file   Lifestyle    Physical activity     Days per week: Not on file     Minutes per session: Not on file    Stress: Not on file   Relationships    Social connections     Talks on phone: Not on file     Gets together: Not on file     Attends [employer] service: Not on file     Active member of club or organization: Not on file     Attends meetings of clubs or organizations: Not on file     Relationship status: Not on file    Intimate partner violence     Fear of current or ex partner: Not on file     Emotionally abused: Not on file     Physically abused: Not on file     Forced sexual activity: Not on file   Other Topics Concern    Not on file   Social History Narrative    Not on file       Current Outpatient Medications:     Azelastine HCl 137 MCG/SPRAY SOLN, into each nostril daily as needed , Disp: , Rfl:     Bacillus Coagulans-Inulin (Probiotic-Prebiotic) 1-250 BILLION-MG CAPS, Take by mouth daily, Disp: , Rfl:     Calcium Carbonate-Vitamin D (Calcium 500 + D) 500-125 MG-UNIT TABS, Take by mouth daily, Disp: , Rfl:     desvenlafaxine succinate (PRISTIQ) 50 mg 24 hr tablet, Take 25 mg by mouth daily, Disp: , Rfl:     irbesartan (AVAPRO) 150 mg tablet, Take 150 mg by mouth daily at bedtime, Disp: , Rfl:     loratadine-pseudoephedrine (CLARITIN-D 24-HOUR)  mg per 24 hr tablet, Take 1 tablet by mouth as needed for allergies, Disp: , Rfl:     losartan-hydrochlorothiazide (HYZAAR) 100-25 MG per tablet, Take 1 tablet by mouth daily, Disp: , Rfl: 0    Multiple Vitamins-Minerals (MULTIVITAMIN WOMEN 50+ PO), Take by mouth daily , Disp: , Rfl:     pantoprazole (PROTONIX) 40 mg tablet, Take 1 tablet (40 mg total) by mouth daily, Disp: 30 tablet, Rfl: 11    Turmeric Curcumin 500 MG CAPS, Take by mouth daily, Disp: , Rfl:     ursodiol (ACTIGALL) 500 MG tablet, Take 2 tablets (1,000 mg total) by mouth 2 (two) times a day, Disp: 120 tablet, Rfl: 11    Na Sulfate-K Sulfate-Mg Sulf (SUPREP BOWEL PREP KIT) 17 5-3 13-1 6 GM/177ML SOLN, Use as directed (Patient not taking: Reported on 8/26/2020), Disp: 2 Bottle, Rfl: 0  Allergies   Allergen Reactions    Aspirin GI Intolerance       Physical Exam:     Vitals:    12/03/20 0928   BP: 120/70   Pulse: 72   Resp: 16   Temp: 97 6 °F (36 4 °C)     Physical Exam  Vitals signs reviewed  Exam conducted with a chaperone present  Constitutional:       Appearance: She is well-developed  HENT:      Head: Normocephalic and atraumatic  Eyes:      Pupils: Pupils are equal, round, and reactive to light  Neck:      Musculoskeletal: Normal range of motion and neck supple  Thyroid: No thyromegaly  Vascular: No JVD  Trachea: No tracheal deviation  Cardiovascular:      Rate and Rhythm: Normal rate and regular rhythm  Heart sounds: Normal heart sounds  No murmur  No friction rub  No gallop  Pulmonary:      Effort: Pulmonary effort is normal  No respiratory distress  Breath sounds: Normal breath sounds  No wheezing or rales  Chest:      Breasts: Breasts are symmetrical          Right: No inverted nipple, mass, nipple discharge, skin change or tenderness  Left: No inverted nipple, mass, nipple discharge, skin change or tenderness  Comments: The right breast was examined in the sitting and supine position  There are no worrisome skin changes, tenderness, inverted nipple, nipple discharge, swelling, bleeding or evidence of a mass in any quadrant  May survey demonstrated no evidence of any clinically suspicious axillary, pectoral or paraclavicular lymph nodes  Abdominal:      General: There is no distension  Palpations: Abdomen is soft  There is no hepatomegaly or mass  Tenderness: There is no abdominal tenderness  There is no guarding or rebound  Musculoskeletal: Normal range of motion  General: No tenderness  Lymphadenopathy:      Cervical: No cervical adenopathy     Skin: General: Skin is warm and dry  Findings: No erythema or rash  Neurological:      Mental Status: She is alert and oriented to person, place, and time  Cranial Nerves: No cranial nerve deficit  Psychiatric:         Behavior: Behavior normal            Results & Discussion:   The patient has 2 small foci of mucinous carcinoma each measuring approximately 1 mm in a background of ductal carcinoma in situ measuring approximately 2 3 cm  Radiation Oncology and I both agree she is a good candidate for intraoperative radiation therapy  I reviewed this procedure with the patient  The patient and I underwent the process of consent for right breast needle localization, right breast lumpectomy, lymphatic mapping using blue and radioactive dye, sentinel lymph node biopsy, possible axillary dissection, possible intraoperative radiation therapy  The complications outlined on the consent including relatively minor problems (wound infection, wound healing problems, hematomas, scarring, chronic discomfort/pain), moderate problems (injury to nerves or blood vessels, allergic reactions) and major complications (extensive blood loss requiring transfusions or possible addtional surgeries, cardiac arrest, stroke and possible death)  We also reviewed specific complications as outlined on the consent form including possible cancer recurrence, possible need for additional radiation therapy, probable need for adjuvant therapies unlikely but possible chance for lymphedema  All questions were answered to the patient's satisfaction  We will coordinate the surgery at our next mutual convience  Advance Care Planning/Advance Directives:  I discussed the disease status, treatment plans and follow-up with the patient

## 2020-12-10 ENCOUNTER — DOCUMENTATION (OUTPATIENT)
Dept: HEMATOLOGY ONCOLOGY | Facility: CLINIC | Age: 65
End: 2020-12-10

## 2020-12-18 NOTE — PRE-PROCEDURE INSTRUCTIONS
Pre-Surgery Instructions:   Medication Instructions    Azelastine HCl 137 MCG/SPRAY SOLN Instructed to continue taking up to and including DOS   Bacillus Coagulans-Inulin (Probiotic-Prebiotic) 1-250 BILLION-MG CAPS Instructed patient per Anesthesia Guidelines   Calcium Carbonate-Vitamin D (Calcium 500 + D) 500-125 MG-UNIT TABS Instructed patient per Anesthesia Guidelines   desvenlafaxine succinate (PRISTIQ) 50 mg 24 hr tablet Instructed to continue taking up to and including DOS   irbesartan (AVAPRO) 150 mg tablet Instructed to continue taking but do not take DOS   loratadine-pseudoephedrine (CLARITIN-D 24-HOUR)  mg per 24 hr tablet Instructed ok to continue taking as ordered if needed   Multiple Vitamins-Minerals (MULTIVITAMIN WOMEN 50+ PO) Instructed pt per anesthesia guidelines   oxyCODONE-acetaminophen (PERCOCET) 5-325 mg per tablet Pt to start post-op   pantoprazole (PROTONIX) 40 mg tablet Instructed to continue taking up to and including DOS   Turmeric Curcumin 500 MG CAPS Instructed patient per Anesthesia Guidelines   ursodiol (ACTIGALL) 500 MG tablet Instructed to continue taking up to and including DOS  Med list reviewed as above  Instructed per anesthesia guidelines to hold all vitamins/supplements x 1 week which pt has already done  Also instructed no NSAID's, but Tylenol ok  Pt has CHG surgical soap and understands preop showering protocol  Reviewed [facility] covid visitation restrictions and pt verbalizes understanding  Pt verbalizes understanding and compliance of all preop instructions

## 2020-12-21 ENCOUNTER — ANESTHESIA EVENT (OUTPATIENT)
Dept: PERIOP | Facility: HOSPITAL | Age: 65
End: 2020-12-21
Payer: COMMERCIAL

## 2020-12-22 ENCOUNTER — ANESTHESIA (OUTPATIENT)
Dept: PERIOP | Facility: HOSPITAL | Age: 65
End: 2020-12-22
Payer: COMMERCIAL

## 2020-12-22 ENCOUNTER — HOSPITAL ENCOUNTER (OUTPATIENT)
Dept: NUCLEAR MEDICINE | Facility: HOSPITAL | Age: 65
Discharge: HOME | End: 2020-12-22
Attending: SURGERY
Payer: COMMERCIAL

## 2020-12-22 ENCOUNTER — HOSPITAL ENCOUNTER (OUTPATIENT)
Facility: HOSPITAL | Age: 65
Setting detail: OUTPATIENT SURGERY
Discharge: HOME | End: 2020-12-22
Attending: SURGERY | Admitting: SURGERY
Payer: COMMERCIAL

## 2020-12-22 ENCOUNTER — APPOINTMENT (OUTPATIENT)
Dept: MAMMOGRAPHY | Facility: HOSPITAL | Age: 65
End: 2020-12-22
Attending: SURGERY
Payer: COMMERCIAL

## 2020-12-22 ENCOUNTER — HOSPITAL ENCOUNTER (OUTPATIENT)
Dept: MAMMOGRAPHY | Facility: HOSPITAL | Age: 65
Discharge: HOME | End: 2020-12-22
Attending: SURGERY
Payer: COMMERCIAL

## 2020-12-22 ENCOUNTER — APPOINTMENT (OUTPATIENT)
Dept: RADIATION ONCOLOGY | Facility: HOSPITAL | Age: 65
End: 2020-12-22
Attending: RADIOLOGY
Payer: COMMERCIAL

## 2020-12-22 VITALS
WEIGHT: 191 LBS | DIASTOLIC BLOOD PRESSURE: 78 MMHG | HEIGHT: 67 IN | HEART RATE: 80 BPM | SYSTOLIC BLOOD PRESSURE: 133 MMHG | OXYGEN SATURATION: 96 % | TEMPERATURE: 98 F | RESPIRATION RATE: 19 BRPM | BODY MASS INDEX: 29.98 KG/M2

## 2020-12-22 VITALS — HEART RATE: 92 BPM

## 2020-12-22 DIAGNOSIS — C50.411 MALIGNANT NEOPLASM OF UPPER-OUTER QUADRANT OF RIGHT BREAST IN FEMALE, ESTROGEN RECEPTOR POSITIVE (HCC): ICD-10-CM

## 2020-12-22 DIAGNOSIS — Z17.0 MALIGNANT NEOPLASM OF UPPER-OUTER QUADRANT OF RIGHT BREAST IN FEMALE, ESTROGEN RECEPTOR POSITIVE (HCC): ICD-10-CM

## 2020-12-22 PROCEDURE — 38900 IO MAP OF SENT LYMPH NODE: CPT | Performed by: SURGERY

## 2020-12-22 PROCEDURE — 88307 TISSUE EXAM BY PATHOLOGIST: CPT | Performed by: PATHOLOGY

## 2020-12-22 PROCEDURE — 77334 RADIATION TREATMENT AID(S): CPT | Performed by: RADIOLOGY

## 2020-12-22 PROCEDURE — 88305 TISSUE EXAM BY PATHOLOGIST: CPT | Performed by: PATHOLOGY

## 2020-12-22 PROCEDURE — 38792 RA TRACER ID OF SENTINL NODE: CPT | Performed by: SURGERY

## 2020-12-22 PROCEDURE — A9541 TC99M SULFUR COLLOID: HCPCS

## 2020-12-22 PROCEDURE — 77316 BRACHYTX ISODOSE PLAN SIMPLE: CPT | Performed by: RADIOLOGY

## 2020-12-22 PROCEDURE — 77470 SPECIAL RADIATION TREATMENT: CPT | Performed by: RADIOLOGY

## 2020-12-22 PROCEDURE — 77336 RADIATION PHYSICS CONSULT: CPT | Performed by: RADIOLOGY

## 2020-12-22 PROCEDURE — C9726 RXT BREAST APPL PLACE/REMOV: HCPCS | Performed by: RADIOLOGY

## 2020-12-22 PROCEDURE — 38525 BIOPSY/REMOVAL LYMPH NODES: CPT | Performed by: SURGERY

## 2020-12-22 PROCEDURE — 88333 PATH CONSLTJ SURG CYTO XM 1: CPT | Performed by: PATHOLOGY

## 2020-12-22 PROCEDURE — 77424 IO RAD TX DELIVERY BY X-RAY: CPT | Performed by: RADIOLOGY

## 2020-12-22 PROCEDURE — 19281 PERQ DEVICE BREAST 1ST IMAG: CPT

## 2020-12-22 PROCEDURE — 88342 IMHCHEM/IMCYTCHM 1ST ANTB: CPT | Performed by: PATHOLOGY

## 2020-12-22 PROCEDURE — 77290 THER RAD SIMULAJ FIELD CPLX: CPT | Performed by: RADIOLOGY

## 2020-12-22 PROCEDURE — 19281 PERQ DEVICE BREAST 1ST IMAG: CPT | Performed by: SURGERY

## 2020-12-22 PROCEDURE — 77370 RADIATION PHYSICS CONSULT: CPT | Performed by: RADIOLOGY

## 2020-12-22 PROCEDURE — 38792 RA TRACER ID OF SENTINL NODE: CPT

## 2020-12-22 PROCEDURE — 88341 IMHCHEM/IMCYTCHM EA ADD ANTB: CPT | Performed by: PATHOLOGY

## 2020-12-22 PROCEDURE — 19301 PARTIAL MASTECTOMY: CPT | Performed by: SURGERY

## 2020-12-22 RX ORDER — LIDOCAINE HYDROCHLORIDE 10 MG/ML
0.5 INJECTION, SOLUTION EPIDURAL; INFILTRATION; INTRACAUDAL; PERINEURAL ONCE AS NEEDED
Status: DISCONTINUED | OUTPATIENT
Start: 2020-12-22 | End: 2020-12-22 | Stop reason: HOSPADM

## 2020-12-22 RX ORDER — CEFAZOLIN SODIUM 2 G/50ML
2000 SOLUTION INTRAVENOUS ONCE
Status: COMPLETED | OUTPATIENT
Start: 2020-12-22 | End: 2020-12-22

## 2020-12-22 RX ORDER — FENTANYL CITRATE 50 UG/ML
INJECTION, SOLUTION INTRAMUSCULAR; INTRAVENOUS AS NEEDED
Status: DISCONTINUED | OUTPATIENT
Start: 2020-12-22 | End: 2020-12-22

## 2020-12-22 RX ORDER — MIDAZOLAM HYDROCHLORIDE 2 MG/2ML
INJECTION, SOLUTION INTRAMUSCULAR; INTRAVENOUS AS NEEDED
Status: DISCONTINUED | OUTPATIENT
Start: 2020-12-22 | End: 2020-12-22

## 2020-12-22 RX ORDER — OXYCODONE HYDROCHLORIDE AND ACETAMINOPHEN 5; 325 MG/1; MG/1
1 TABLET ORAL EVERY 4 HOURS PRN
Status: DISCONTINUED | OUTPATIENT
Start: 2020-12-22 | End: 2020-12-22 | Stop reason: HOSPADM

## 2020-12-22 RX ORDER — PROPOFOL 10 MG/ML
INJECTION, EMULSION INTRAVENOUS AS NEEDED
Status: DISCONTINUED | OUTPATIENT
Start: 2020-12-22 | End: 2020-12-22

## 2020-12-22 RX ORDER — BUPIVACAINE HYDROCHLORIDE 2.5 MG/ML
INJECTION, SOLUTION EPIDURAL; INFILTRATION; INTRACAUDAL AS NEEDED
Status: DISCONTINUED | OUTPATIENT
Start: 2020-12-22 | End: 2020-12-22 | Stop reason: HOSPADM

## 2020-12-22 RX ORDER — ONDANSETRON 2 MG/ML
4 INJECTION INTRAMUSCULAR; INTRAVENOUS ONCE AS NEEDED
Status: DISCONTINUED | OUTPATIENT
Start: 2020-12-22 | End: 2020-12-22 | Stop reason: HOSPADM

## 2020-12-22 RX ORDER — LIDOCAINE HYDROCHLORIDE 10 MG/ML
INJECTION, SOLUTION EPIDURAL; INFILTRATION; INTRACAUDAL; PERINEURAL AS NEEDED
Status: DISCONTINUED | OUTPATIENT
Start: 2020-12-22 | End: 2020-12-22

## 2020-12-22 RX ORDER — SODIUM CHLORIDE, SODIUM LACTATE, POTASSIUM CHLORIDE, CALCIUM CHLORIDE 600; 310; 30; 20 MG/100ML; MG/100ML; MG/100ML; MG/100ML
125 INJECTION, SOLUTION INTRAVENOUS CONTINUOUS
Status: DISCONTINUED | OUTPATIENT
Start: 2020-12-22 | End: 2020-12-22 | Stop reason: HOSPADM

## 2020-12-22 RX ORDER — ONDANSETRON 2 MG/ML
4 INJECTION INTRAMUSCULAR; INTRAVENOUS EVERY 6 HOURS PRN
Status: DISCONTINUED | OUTPATIENT
Start: 2020-12-22 | End: 2020-12-22 | Stop reason: HOSPADM

## 2020-12-22 RX ORDER — FENTANYL CITRATE/PF 50 MCG/ML
25 SYRINGE (ML) INJECTION
Status: DISCONTINUED | OUTPATIENT
Start: 2020-12-22 | End: 2020-12-22 | Stop reason: HOSPADM

## 2020-12-22 RX ORDER — LIDOCAINE HYDROCHLORIDE 10 MG/ML
5 INJECTION, SOLUTION EPIDURAL; INFILTRATION; INTRACAUDAL; PERINEURAL ONCE
Status: COMPLETED | OUTPATIENT
Start: 2020-12-22 | End: 2020-12-22

## 2020-12-22 RX ORDER — MAGNESIUM HYDROXIDE 1200 MG/15ML
LIQUID ORAL AS NEEDED
Status: DISCONTINUED | OUTPATIENT
Start: 2020-12-22 | End: 2020-12-22 | Stop reason: HOSPADM

## 2020-12-22 RX ORDER — ONDANSETRON 2 MG/ML
INJECTION INTRAMUSCULAR; INTRAVENOUS AS NEEDED
Status: DISCONTINUED | OUTPATIENT
Start: 2020-12-22 | End: 2020-12-22

## 2020-12-22 RX ORDER — HYDROMORPHONE HCL/PF 1 MG/ML
0.4 SYRINGE (ML) INJECTION
Status: DISCONTINUED | OUTPATIENT
Start: 2020-12-22 | End: 2020-12-22 | Stop reason: HOSPADM

## 2020-12-22 RX ORDER — ACETAMINOPHEN 325 MG/1
975 TABLET ORAL EVERY 6 HOURS PRN
Status: DISCONTINUED | OUTPATIENT
Start: 2020-12-22 | End: 2020-12-22 | Stop reason: HOSPADM

## 2020-12-22 RX ORDER — OXYCODONE HYDROCHLORIDE AND ACETAMINOPHEN 5; 325 MG/1; MG/1
1 TABLET ORAL EVERY 4 HOURS PRN
Status: DISCONTINUED | OUTPATIENT
Start: 2020-12-22 | End: 2020-12-22

## 2020-12-22 RX ORDER — PROMETHAZINE HYDROCHLORIDE 25 MG/ML
6.25 INJECTION, SOLUTION INTRAMUSCULAR; INTRAVENOUS
Status: DISCONTINUED | OUTPATIENT
Start: 2020-12-22 | End: 2020-12-22 | Stop reason: HOSPADM

## 2020-12-22 RX ORDER — METOCLOPRAMIDE HYDROCHLORIDE 5 MG/ML
5 INJECTION INTRAMUSCULAR; INTRAVENOUS ONCE AS NEEDED
Status: DISCONTINUED | OUTPATIENT
Start: 2020-12-22 | End: 2020-12-22 | Stop reason: HOSPADM

## 2020-12-22 RX ORDER — HYDROMORPHONE HCL/PF 1 MG/ML
0.5 SYRINGE (ML) INJECTION
Status: DISCONTINUED | OUTPATIENT
Start: 2020-12-22 | End: 2020-12-22 | Stop reason: HOSPADM

## 2020-12-22 RX ORDER — DEXAMETHASONE SODIUM PHOSPHATE 10 MG/ML
INJECTION, SOLUTION INTRAMUSCULAR; INTRAVENOUS AS NEEDED
Status: DISCONTINUED | OUTPATIENT
Start: 2020-12-22 | End: 2020-12-22

## 2020-12-22 RX ORDER — PROPOFOL 10 MG/ML
INJECTION, EMULSION INTRAVENOUS CONTINUOUS PRN
Status: DISCONTINUED | OUTPATIENT
Start: 2020-12-22 | End: 2020-12-22

## 2020-12-22 RX ADMIN — FENTANYL CITRATE 25 MCG: 50 INJECTION, SOLUTION INTRAMUSCULAR; INTRAVENOUS at 17:31

## 2020-12-22 RX ADMIN — ONDANSETRON 4 MG: 2 INJECTION INTRAMUSCULAR; INTRAVENOUS at 18:37

## 2020-12-22 RX ADMIN — PROPOFOL: 10 INJECTION, EMULSION INTRAVENOUS at 17:35

## 2020-12-22 RX ADMIN — FENTANYL CITRATE 25 MCG: 50 INJECTION, SOLUTION INTRAMUSCULAR; INTRAVENOUS at 19:24

## 2020-12-22 RX ADMIN — FENTANYL CITRATE 25 MCG: 50 INJECTION, SOLUTION INTRAMUSCULAR; INTRAVENOUS at 17:04

## 2020-12-22 RX ADMIN — MIDAZOLAM HYDROCHLORIDE 2 MG: 1 INJECTION, SOLUTION INTRAMUSCULAR; INTRAVENOUS at 16:33

## 2020-12-22 RX ADMIN — SODIUM CHLORIDE, SODIUM LACTATE, POTASSIUM CHLORIDE, AND CALCIUM CHLORIDE: .6; .31; .03; .02 INJECTION, SOLUTION INTRAVENOUS at 17:24

## 2020-12-22 RX ADMIN — SODIUM CHLORIDE, SODIUM LACTATE, POTASSIUM CHLORIDE, AND CALCIUM CHLORIDE: .6; .31; .03; .02 INJECTION, SOLUTION INTRAVENOUS at 16:25

## 2020-12-22 RX ADMIN — LIDOCAINE HYDROCHLORIDE 5 ML: 10 INJECTION, SOLUTION EPIDURAL; INFILTRATION; INTRACAUDAL at 13:26

## 2020-12-22 RX ADMIN — FENTANYL CITRATE 25 MCG: 50 INJECTION, SOLUTION INTRAMUSCULAR; INTRAVENOUS at 16:44

## 2020-12-22 RX ADMIN — FENTANYL CITRATE 25 MCG: 50 INJECTION, SOLUTION INTRAMUSCULAR; INTRAVENOUS at 18:58

## 2020-12-22 RX ADMIN — PROPOFOL 100 MCG/KG/MIN: 10 INJECTION, EMULSION INTRAVENOUS at 16:43

## 2020-12-22 RX ADMIN — LIDOCAINE HYDROCHLORIDE 50 MG: 10 INJECTION, SOLUTION EPIDURAL; INFILTRATION; INTRACAUDAL at 16:35

## 2020-12-22 RX ADMIN — DEXAMETHASONE SODIUM PHOSPHATE 4 MG: 10 INJECTION, SOLUTION INTRAMUSCULAR; INTRAVENOUS at 16:39

## 2020-12-22 RX ADMIN — PROPOFOL 160 MG: 10 INJECTION, EMULSION INTRAVENOUS at 16:36

## 2020-12-22 RX ADMIN — PROPOFOL 50 MG: 10 INJECTION, EMULSION INTRAVENOUS at 16:44

## 2020-12-22 RX ADMIN — FENTANYL CITRATE 25 MCG: 50 INJECTION, SOLUTION INTRAMUSCULAR; INTRAVENOUS at 16:56

## 2020-12-22 RX ADMIN — FENTANYL CITRATE 25 MCG: 50 INJECTION, SOLUTION INTRAMUSCULAR; INTRAVENOUS at 18:50

## 2020-12-22 RX ADMIN — FENTANYL CITRATE 25 MCG: 50 INJECTION, SOLUTION INTRAMUSCULAR; INTRAVENOUS at 19:25

## 2020-12-22 RX ADMIN — CEFAZOLIN SODIUM 2000 MG: 2 SOLUTION INTRAVENOUS at 16:33

## 2020-12-22 NOTE — INTERVAL H&P NOTE
H&P reviewed  After examining the patient I find no changes in the patients condition since the H&P had been written      Vitals:    12/22/20 1330   BP: 166/82   Pulse: 77   Resp: 18   Temp: (!) 97 °F (36 1 °C)   SpO2: 98%

## 2020-12-22 NOTE — DISCHARGE INSTRUCTIONS
POST-OPERATIVE BREAST CARE INSTRUCTIONS    Wound Closure/Dressing: Your wound is closed with:   Steri strips-white pieces of tape that hold your incision together along with surgical glue           Dissolvable sutures-underneath the skin    Wound care:     If you have gauze over your wound you may remove it the day after surgery  Leave the Steri-strips on, they will fall off on their own in 1-2 weeks   You may shower using soap and water to clean your wound  Gently pat dry  Drain Care: If you do not have a drain, disregard this section   Visiting Nursing should have been arranged upon discharge from hospital   A nurse will call your home to schedule an initial visit  Please call the number below if you have not received a call within 48 hours of hospital discharge   You may shower with the LUIS A drains  Wash area around the drains with soap and water and pat dry   Record drain outputs on chart given to you at pre op visit and bring that chart to office at post op appt   LUIS A drains can be removed in the office by a nurse either at post op visit OR when drain output is 30 ccs or less in a 24 hour period for three days in a row   If you had plastic surgery or reconstructive surgery, the plastic surgeon will manage the drains  Other:       You can begin wearing your own bra when it feels comfortable   You may resume using deodorant the day after surgery                 Post-op visit:  your post-op visit is scheduled for:  2021 @ 9:30 AM    Call our office at [phone] if you have any  of the followin  Redness, swelling, heat, unusual drainage or heavy bleeding from wound  2  Fever greater than 101 °  3  Pain not relieved by medication

## 2020-12-22 NOTE — OP NOTE
OPERATIVE REPORT  PATIENT NAME: Cici Mcelroy    :  1955  MRN: [id number]  Pt Location: [facility] OR ROOM 03    SURGERY DATE: 2020    Surgeon(s) and Role:     * Meme Lee MD - Primary     * Perla Baum MD - Assisting    Preop Diagnosis:  Malignant neoplasm of upper-outer quadrant of right breast in female, estrogen receptor positive ([facility] [address] ) [C50 411, Z17 0]    Post-Op Diagnosis Codes:     * Malignant neoplasm of upper-outer quadrant of right breast in female, estrogen receptor positive ([facility] [address] ) [C50 411, Z17 0]    Procedure(s) (LRB):  BREAST NEEDLE LOCALIZED LUMPECTOMY (NEEDLE LOC AT 1230) (Right)  SENTINEL LYMPH NODE BIOPSY; LYMPHATIC MAPPING WITH BLUE DYE AND RADIOACTIVE DYE (INJECT AT 1400 BY DR KNOX IN THE OR) (Right)  BREAST INTRAOPERATIVE RADIATION THERAPY (IORT) BY DR SOTO (Right)    Specimen(s):  ID Type Source Tests Collected by Time Destination   1 : RIGHT breast lumpectomy; final lateral margin Tissue Breast, Right TISSUE EXAM Meme Lee MD 2020 1712    2 : RIGHT axilla sentinel lymph node  Tissue Axillary lymph node TISSUE EXAM Meme Lee MD 2020 1713    3 : RIGHT breast lumpectomy  Tissue Breast, Right TISSUE EXAM Meme Lee MD 2020 1721    4 : RIGHT breast lumpectomy; anterior margin Tissue Breast, Right TISSUE EXAM Meme Lee MD 2020 1724    5 : RIGHT breast lumpectomy; inferior margin Tissue Breast, Right TISSUE EXAM Meme Lee MD 2020 1724    6 : RIGHT breast lumpectomy; superior margin Tissue Breast, Right TISSUE EXAM Meme Lee MD 2020 1724    7 : RIGHT breast lumpectomy; medial margin  Tissue Breast, Right TISSUE Anneliese Johnson MD 2020 172        Estimated Blood Loss:   10 mL    Drains:  * No LDAs found *    Anesthesia Type:   General    Operative Indications:  Malignant neoplasm of upper-outer quadrant of right breast in female, estrogen receptor positive ([facility] [address] ) [C50 411, Z17 0]      Operative Findings:  Good specimen radiograph, the sentinel lymph node was a single lymph node which was blue and radioactive in negative on touch prep the patient received a 4 5 cm IORT applicator and 35 minutes of radiation therapy (20 Gy)  Complications:   None    Procedure and Technique:  Procedure and Technique:  The patient was brought to the operation room and placed under general anesthesia  Attention was paid to ensure appropriate padding and correct positioning  The right breast was injected intradermally with 0 2cc of methylene blue followed by technetium sulfur colloid  This area was vigorously massaged to facilitate identification of the sentinel lymph node (SLN)  The breast and axillary region were then prepped and draped in a sterile fashion  I initiated a time-out, identifying the patient, the correct side and the above procedure  All parties agreed with the time out  SLN Biopsy  The previously marked location of the sentinel lymph node was injected with 0 25% Marcaine  A curivilinear incision was made and dissection was taken down into the axillar proper with electrocautery  The ricarda counter was used to help localize the SLN  The sentinel lymph node was identified and removed with electrocautery  SLN Findings  The SLN was blue and radioactive  The lymph node was grossly normal and sent for touch prep which returned a diagnosis of negative  Lumpectomy  The planned incision around the areola was then injected with 0 25% Marcaine  for local anesthesia  The incision was sharply incised  The localizing wire was used to guide the dissection  Superior, inferior, anterior and posterior planes were developed around the localizing wire and the specimen truncated medially with electrocautery just beyond the tip of the wire  The specimen was then inked for orientation purposes  A specimen radiograph was taken in two dimensions and additional margins were removed to optimize complete removal of the tumor    The additional margins were inked on the most distal area  The final lateral margin was skin and the final posterior margin was muscle   All specimens were sent to pathology for permanent analysis  IORT  A prolene purse-string was placed around the superficial tissues  The cavity was measured to estimate the size of the IORT applicator  The 4 5 cm applicator was chosen and placed within the cavity to make sure it was an appropriate fit  The applicator was then placed on the IORT arm, sterilely drapped and repositioned in the cavity  The purse-string was secured  Ultrasound was then used to measure the applicator to skin distances in 4 quadrants  The closest distance was 1 8 cm  This was verified with the Radiation Oncologist   The protective shielding was then placed  We left the operating theater and Radiation Oncology initiated the treatment to deliver 20Gy  After 35 minutes of radiation, we re-entered the room and removed the applicator  Superficial bleeding controlled with electrocautery and the wounds were extensively irrigated  The wounds were closed with two layers, an inner layer of 3-0 vicryl and a subcuticular layer of 4-0 monocryl  Steri-strips were applied       I was present for all critical portions of the procedure    Patient Disposition:  PACU     SIGNATURE: Eloy Espinal MD  DATE: December 22, 2020  TIME: 6:37 PM

## 2020-12-30 ENCOUNTER — TELEPHONE (OUTPATIENT)
Dept: RADIATION ONCOLOGY | Facility: HOSPITAL | Age: 65
End: 2020-12-30

## 2021-01-04 ENCOUNTER — OFFICE VISIT (OUTPATIENT)
Dept: SURGICAL ONCOLOGY | Facility: CLINIC | Age: 66
End: 2021-01-04

## 2021-01-04 VITALS
TEMPERATURE: 97.8 F | WEIGHT: 196 LBS | HEART RATE: 82 BPM | RESPIRATION RATE: 16 BRPM | SYSTOLIC BLOOD PRESSURE: 140 MMHG | DIASTOLIC BLOOD PRESSURE: 90 MMHG | HEIGHT: 67 IN | BODY MASS INDEX: 30.76 KG/M2

## 2021-01-04 DIAGNOSIS — Z17.0 MALIGNANT NEOPLASM OF UPPER-OUTER QUADRANT OF RIGHT BREAST IN FEMALE, ESTROGEN RECEPTOR POSITIVE (HCC): ICD-10-CM

## 2021-01-04 DIAGNOSIS — Z98.890 STATUS POST RIGHT BREAST LUMPECTOMY: ICD-10-CM

## 2021-01-04 DIAGNOSIS — Z71.89 OTHER SPECIFIED COUNSELING: Primary | ICD-10-CM

## 2021-01-04 DIAGNOSIS — C50.411 MALIGNANT NEOPLASM OF UPPER-OUTER QUADRANT OF RIGHT BREAST IN FEMALE, ESTROGEN RECEPTOR POSITIVE (HCC): ICD-10-CM

## 2021-01-04 PROCEDURE — 99024 POSTOP FOLLOW-UP VISIT: CPT | Performed by: SURGERY

## 2021-01-04 NOTE — PROGRESS NOTES
Surgical Oncology Breast Post-Op       [address] [facility],[address]  [facility]  [address]  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, both 1 1 mm, 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  Grade 1    GA 25  HER2 1+  Lymphovascular invasion not identified    Concordant  Malignancy appears unifocal; calcifications span 2 3cm  US right axila has not been performed  Left breast US recommended  10/6/2020 B/L US - no right axillary adenopathy, benign findings in left breast      12/22/2020 Surgery    Right breast needle localized lumpectomy with sentinel lymph node biopsy  Mucinous carcinoma  Grade 1  1 1 mm  Extensive DCIS (at least 4 cm)  DCIS less than 1 mm form lateral posterior lumpectomy margin  0/1 Lymph node  Anatomic/Prognostic Stage IA     12/22/2020 - 12/22/2020 Radiation    IORT     Dr Shavonne Su:   Assessment/Plan    The patient presents for postoperative visit  She had difficulty have an IV placed at the time of her surgery otherwise she has no complaints referable to the surgery  She has no current complaints  I reviewed her pathology with her she has had intraoperative radiation therapy  She has a follow-up appointment with Radiation Oncology  She had a close posterior lateral margin the final lateral margin was negative  The posterior margin was on muscle    I have recommended she see Dr Gonzalo Hernandez for a 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 [address] )    Morales's esophagus without dysplasia    Malignant neoplasm of upper-outer quadrant of right breast in female, estrogen receptor positive ([facility] [address] )     Past Medical History:   Diagnosis Date    Anxiety     Morales esophagus     Gastroesophageal reflux disease without esophagitis 5/14/2019    GERD (gastroesophageal reflux disease)     History of gallstones     Hypertension     Personal history of colonic polyps 5/14/2019    Primary biliary cirrhosis ([facility] [address] ) 5/14/2019    Seasonal allergies      Past Surgical History:   Procedure Laterality Date    APPENDECTOMY      BREAST LUMPECTOMY Right 12/22/2020    Procedure: BREAST NEEDLE LOCALIZED LUMPECTOMY (NEEDLE LOC AT 1230); Surgeon: Amie Roman MD;  [facility];  Service: Surgical Oncology    COLONOSCOPY  06/24/2020     2 adenomas  Five year recall advised    INTRAOPERATIVE RADIATION THERAPY (IORT) Right 12/22/2020    Procedure: BREAST INTRAOPERATIVE RADIATION THERAPY (IORT) BY DR Judy Santos;  Surgeon: Amie Roman MD;  [facility];  Service: Surgical Oncology    LYMPH NODE BIOPSY Right 12/22/2020    Procedure: SENTINEL LYMPH NODE BIOPSY; LYMPHATIC MAPPING WITH BLUE DYE AND RADIOACTIVE DYE (INJECT AT 1400 BY DR KNOX IN THE OR);   Surgeon: Amie Roman MD;  Location: [facility];  Service: Surgical Oncology    MAMMO NEEDLE LOCALIZATION RIGHT (ALL INC) Right 12/22/2020    MAMMO STEREOTACTIC BREAST BIOPSY RIGHT (ALL INC) Right 9/24/2020    TUBAL LIGATION      UPPER GASTROINTESTINAL ENDOSCOPY  06/24/2019    irregular z-line    biopsies negative for Morales's     Family History   Problem Relation Age of Onset    No Known Problems Mother     Lung cancer Father 48    No Known Problems Sister     No Known Problems Brother     No Known Problems Daughter     No Known Problems Maternal Grandmother     No Known Problems Maternal Grandfather     No Known Problems Paternal Grandmother     No Known Problems Paternal Grandfather     No Known Problems Sister     No Known Problems Maternal Aunt     No Known Problems Paternal Aunt     No Known Problems Paternal Aunt      Social History     Socioeconomic History    Marital status: /Civil Union     Spouse name: Not on file    Number of children: Not on file    Years of education: Not on file    Highest education level: Not on file   Occupational History    Not on file   Social Needs    Financial resource strain: Not on file    Food insecurity     Worry: Not on file     Inability: Not on file   [employer] needs     Medical: Not on file     Non-medical: Not on file   Tobacco Use    Smoking status: Never Smoker    Smokeless tobacco: Never Used   Substance and Sexual Activity    Alcohol use: Yes     Frequency: 2-3 times a week     Drinks per session: 1 or 2    Drug use: Never    Sexual activity: Not on file   Lifestyle    Physical activity     Days per week: Not on file     Minutes per session: Not on file    Stress: Not on file   Relationships    Social connections     Talks on phone: Not on file     Gets together: Not on file     Attends [employer] service: Not on file     Active member of club or organization: Not on file     Attends meetings of clubs or organizations: Not on file     Relationship status: Not on file    Intimate partner violence     Fear of current or ex partner: Not on file     Emotionally abused: Not on file     Physically abused: Not on file     Forced sexual activity: Not on file Other Topics Concern    Not on file   Social History Narrative    Not on file       Current Outpatient Medications:     Azelastine HCl 137 MCG/SPRAY SOLN, into each nostril daily as needed , Disp: , Rfl:     Bacillus Coagulans-Inulin (Probiotic-Prebiotic) 1-250 BILLION-MG CAPS, Take by mouth daily, Disp: , Rfl:     Calcium Carbonate-Vitamin D (Calcium 500 + D) 500-125 MG-UNIT TABS, Take by mouth daily, Disp: , Rfl:     desvenlafaxine succinate (PRISTIQ) 50 mg 24 hr tablet, Take 50 mg by mouth daily , Disp: , Rfl:     irbesartan (AVAPRO) 150 mg tablet, Take 150 mg by mouth daily at bedtime, Disp: , Rfl:     loratadine-pseudoephedrine (CLARITIN-D 24-HOUR)  mg per 24 hr tablet, Take 1 tablet by mouth as needed for allergies, Disp: , Rfl:     Multiple Vitamins-Minerals (MULTIVITAMIN WOMEN 50+ PO), Take by mouth daily , Disp: , Rfl:     pantoprazole (PROTONIX) 40 mg tablet, Take 1 tablet (40 mg total) by mouth daily, Disp: 30 tablet, Rfl: 11    Turmeric Curcumin 500 MG CAPS, Take by mouth daily, Disp: , Rfl:     ursodiol (ACTIGALL) 500 MG tablet, Take 2 tablets (1,000 mg total) by mouth 2 (two) times a day, Disp: 120 tablet, Rfl: 11    oxyCODONE-acetaminophen (PERCOCET) 5-325 mg per tablet, Take 1 tablet by mouth every 6 (six) hours as needed for moderate painMax Daily Amount: 4 tablets (Patient not taking: Reported on 1/4/2021), Disp: 6 tablet, Rfl: 0  Allergies   Allergen Reactions    Aspirin GI Intolerance       Physical Exams:     Vitals:    01/04/21 0931   BP: 140/90   Pulse: 82   Resp: 16   Temp: 97 8 °F (36 6 °C)     Physical Exam  Chest:          Comments: Examination the right breast demonstrates no ecchymosis, seroma or hematoma  She has a good cosmetic outcome  Results & Discussion:   The patient is surgery IORT are over  Her margins are negative though the posterior margin was somewhat close this is on muscle  She has a follow-up appoint with Radiation Oncology    She also had a very small 1-2 mm mucinous carcinoma  I will have her see Medical Oncology as well  I will see her back in 3 months  All questions were answered the patient's satisfaction  I have spent 20 minutes with Patient and family today in which greater than 50% of this time was spent in counseling/coordination of care regarding role of intraoperative radiation therapy, clean margins/close margins, role for anti hormonal therapy for invasive cancers

## 2021-01-04 NOTE — TELEPHONE ENCOUNTER
Pt referred to Dr Renny Santoro by Dr Marie Barone  Scheduled for 1/15/21 in [facility]

## 2021-01-15 ENCOUNTER — CONSULT (OUTPATIENT)
Dept: HEMATOLOGY ONCOLOGY | Facility: CLINIC | Age: 66
End: 2021-01-15
Payer: COMMERCIAL

## 2021-01-15 VITALS
DIASTOLIC BLOOD PRESSURE: 72 MMHG | WEIGHT: 192 LBS | HEART RATE: 82 BPM | BODY MASS INDEX: 30.13 KG/M2 | RESPIRATION RATE: 18 BRPM | TEMPERATURE: 98.8 F | OXYGEN SATURATION: 97 % | HEIGHT: 67 IN | SYSTOLIC BLOOD PRESSURE: 126 MMHG

## 2021-01-15 DIAGNOSIS — Z17.0 MALIGNANT NEOPLASM OF UPPER-OUTER QUADRANT OF RIGHT BREAST IN FEMALE, ESTROGEN RECEPTOR POSITIVE (HCC): ICD-10-CM

## 2021-01-15 DIAGNOSIS — C50.411 MALIGNANT NEOPLASM OF UPPER-OUTER QUADRANT OF RIGHT BREAST IN FEMALE, ESTROGEN RECEPTOR POSITIVE (HCC): ICD-10-CM

## 2021-01-15 PROCEDURE — 99205 OFFICE O/P NEW HI 60 MIN: CPT | Performed by: INTERNAL MEDICINE

## 2021-01-15 RX ORDER — ANASTROZOLE 1 MG/1
1 TABLET ORAL DAILY
Qty: 90 TABLET | Refills: 1 | Status: SHIPPED | OUTPATIENT
Start: 2021-01-15 | End: 2021-06-02

## 2021-01-15 NOTE — PROGRESS NOTES
Hematology / Oncology Outpatient Consult Note    Joseph King 76 y o  female [id number]         Date:  1/15/2021    Assessment / Plan:  A 50-year-old postmenopausal woman with newly diagnosed stage I A right breast cancer, grade 1 with mucinous histology, % positive, UT 25% positive, HER2 negative disease  She underwent lumpectomy and sentinel lymph node biopsy, resulting in TAMARA  She presents today with her  to discuss adjuvant treatment options  We had extensive discussion regarding the diagnosis, staging information, low-grade histology, tumor phenotype, very good prognosis and treatment options  Obviously, adjuvant chemotherapy is not indicated due to the low grade histology  I recommended her to have adjuvant hormonal therapy with anastrozole for 5 years  Side effects of anastrozole was thoroughly discussed, including but not limited to hot flashes, musculoskeletal symptom and bone mineral density loss  We also discussed the breast cancer outcome if she chose not to have adjuvant hormonal therapy  She decided to try anastrozole  I recommended her to continue with calcium vitamin-D on a regular basis  I will see her again in 6 months for routine follow-up  Subjective:     HPI:  A 76years old postmenopausal woman who was found to have radiographic abnormality in her right breast for which she underwent biopsy in September 24, 2020  She had invasive mucinous carcinoma, grade 1  This was % positive, UT 25% positive, HER2 negative disease  Subsequently, she underwent lumpectomy and sentinel lymph node biopsy by Dr Cristhian Garcia in December 22, 2020 which showed 2 mm and 1 1 mm of mucinous carcinoma, grade 1  There was no evidence of lymphovascular invasion  1 sentinel lymph node was negative for metastatic disease  She presents today with her  to discuss adjuvant treatment options  She feels well with no pain  She has no respiratory symptoms    Her weight is stable  She has hypertension as well as primary biliary cirrhosis for which she is on ursodil  Her liver function test is normal   She has no family history of breast cancer  She is a lifetime never smoker  Her performance status is normal       Interval History:          Objective:     Primary Diagnosis:    Right breast cancer, stage I A (pT1a, pN0, M0) grade 1, mucinous histology, % positive, VT 25% positive, HER2 negative disease  Diagnosed in December 2020  Cancer Staging:  Cancer Staging  No matching staging information was found for the patient  Previous Hematologic/ Oncologic Treatment:         Current Hematologic/ Oncologic Treatment:      Adjuvant hormonal therapy with anastrozole to be started in January 2021  Disease Status:     TAMARA status post lumpectomy and sentinel lymph node biopsy  Test Results:    Pathology:    2 mm and 1 1 mm of invasive mucinous carcinoma, grade 1  No evidence of lymphovascular invasion  1 sentinel lymph node was negative for metastatic disease  % positive, VT 25% positive, HER2 negative disease  Stage I A (pT1a, pN0, M0)    Radiology:    Chest x-ray was negative for pulmonary disease  Laboratory:    See below  Physical Exam:      General Appearance:    Alert, oriented        Eyes:    PERRL   Ears:    Normal external ear canals, both ears   Nose:   Nares normal, septum midline   Throat:   Mucosa moist  Pharynx without injection  Neck:   Supple       Lungs:     Clear to auscultation bilaterally   Chest Wall:    No tenderness or deformity    Heart:    Regular rate and rhythm       Abdomen:     Soft, non-tender, bowel sounds +, no organomegaly           Extremities:   Extremities no cyanosis or edema       Skin:   no rash or icterus      Lymph nodes:   Cervical, supraclavicular, and axillary nodes normal   Neurologic:   CNII-XII intact, normal strength, sensation and reflexes     Throughout          Breast exam:   Lumpectomy scar at outer upper quadrant of her right breast with no palpable abnormality  Left breast exam is negative  ROS: Review of Systems   All other systems reviewed and are negative  Imaging: Mammo Needle Localization Right ([employer])    Result Date: 12/22/2020  Narrative: MAMMOGRAPHY NEEDLE LOCALIZATION Indication: Right breast carcinoma  Localize stereotactic clip preoperatively Comparison: Prior imaging studies of the left breast Procedure: Informed consent was obtained prior to the procedure, discussing possible complications such as bleeding, infection, or allergic reaction  After verifying patient and side of interest and initialing side of concern (time out procedure), and utilizing digital mammographic guidance and sterile technique, a needle localization procedure of the stereotactic clip in the upper outer quadrant was performed from a lateral approach  The localizing  wire was positioned adjacent to the targeted stereotactic clip  A radiopaque skin marker was placed at the wire entry site  The patient tolerated the procedure well, leaving the department in satisfactory condition, with  guidance images available on PACS  Impression: Impression: Successful needle localization procedure of the targeted stereotactic clip  Workstation performed: [id number]     Nm Sentinal Node Inj    Result Date: 12/22/2020  Narrative: SENTINEL NODE LYMPHOSCINTIGRAPHY INDICATION:  C50 411: Malignant neoplasm of upper-outer quadrant of right female breast Z17 0: Estrogen receptor positive status (ER+)  , localize sentinel node  FINDINGS: 0 38 mCi Tc-99m sulfur colloid (0 6 cc volume) was administered intradermally into the right breast by Dr Mili Jiang in the OR  No imaging was performed  Impression: Injection of  0 38 mCi Tc-99m sulfur colloid into the right breast in the OR   Workstation performed: [id number]     Mammo Breast Specimen Right (no Charge)    Result Date: 12/23/2020  Narrative: MAMMOGRAPHY BREAST SPECIMEN INDICATION: Evaluation of right breast surgical specimen TECHNIQUE: 2 digital images submitted; images marked with respect to specimen margins  FINDINGS: The specimen includes the localizing wire  The stereotactic clip  in question is included in the specimen  Impression: The breast specimen includes the localizing wire and targeted stereotactic clip    Workstation performed: [id number]         Labs:   Lab Results   Component Value Date    WBC 6 81 12/03/2020    HGB 13 1 12/03/2020    HCT 39 7 12/03/2020    MCV 95 12/03/2020     12/03/2020     Lab Results   Component Value Date    K 3 8 12/03/2020     12/03/2020    CO2 26 12/03/2020    BUN 10 12/03/2020    CREATININE 0 72 12/03/2020    GLUF 100 (H) 07/07/2020    CALCIUM 9 2 12/03/2020    AST 16 12/03/2020    ALT 27 12/03/2020    ALKPHOS 110 12/03/2020    EGFR 88 12/03/2020         Vital Sign:    Body surface area is 1 99 meters squared      Wt Readings from Last 3 Encounters:   01/15/21 87 1 kg (192 lb)   01/04/21 88 9 kg (196 lb)   12/22/20 86 6 kg (191 lb)        Temp Readings from Last 3 Encounters:   01/15/21 98 8 °F (37 1 °C) (Tympanic Core)   01/04/21 97 8 °F (36 6 °C)   12/22/20 98 °F (36 7 °C)        BP Readings from Last 3 Encounters:   01/15/21 126/72   01/04/21 140/90   12/22/20 133/78         Pulse Readings from Last 3 Encounters:   01/15/21 82   01/04/21 82   12/22/20 92     @LASTSAO2(3)@    Active Problems:   Patient Active Problem List   Diagnosis    Personal history of colonic polyps    Gastroesophageal reflux disease without esophagitis    Primary biliary cirrhosis ([facility] [address] )    Morales's esophagus without dysplasia    Malignant neoplasm of upper-outer quadrant of right breast in female, estrogen receptor positive ([facility] [address] )       Past Medical History:   Past Medical History:   Diagnosis Date    Anxiety     Morales esophagus     Gastroesophageal reflux disease without esophagitis 5/14/2019    GERD (gastroesophageal reflux disease)     History of gallstones     Hypertension     Personal history of colonic polyps 5/14/2019    Primary biliary cirrhosis ([facility] [address] ) 5/14/2019    Seasonal allergies        Surgical History:   Past Surgical History:   Procedure Laterality Date    APPENDECTOMY      BREAST LUMPECTOMY Right 12/22/2020    Procedure: BREAST NEEDLE LOCALIZED LUMPECTOMY (NEEDLE LOC AT 1230); Surgeon: Kiah Anthony MD;  [facility];  Service: Surgical Oncology    COLONOSCOPY  06/24/2020     2 adenomas  Five year recall advised    INTRAOPERATIVE RADIATION THERAPY (IORT) Right 12/22/2020    Procedure: BREAST INTRAOPERATIVE RADIATION THERAPY (IORT) BY DR Deepa Gates;  Surgeon: Kiah Anthony MD;  [facility];  Service: Surgical Oncology    LYMPH NODE BIOPSY Right 12/22/2020    Procedure: SENTINEL LYMPH NODE BIOPSY; LYMPHATIC MAPPING WITH BLUE DYE AND RADIOACTIVE DYE (INJECT AT 1400 BY DR KNOX IN THE OR); Surgeon: Kiah Anthony MD;  [facility];  Service: Surgical Oncology    MAMMO NEEDLE LOCALIZATION RIGHT ([employer]) Right 12/22/2020    MAMMO STEREOTACTIC BREAST BIOPSY RIGHT ([employer] Right 9/24/2020    TUBAL LIGATION      UPPER GASTROINTESTINAL ENDOSCOPY  06/24/2019    irregular z-line  biopsies negative for Morales's       Family History:    Family History   Problem Relation Age of Onset    No Known Problems Mother     Lung cancer Father 48    No Known Problems Sister     No Known Problems Brother     No Known Problems Daughter     No Known Problems Maternal Grandmother     No Known Problems Maternal Grandfather     No Known Problems Paternal Grandmother     No Known Problems Paternal Grandfather     No Known Problems Sister     No Known Problems Maternal Aunt     No Known Problems Paternal Aunt     No Known Problems Paternal Aunt        Cancer-related family history includes Lung cancer (age of onset: 48) in her father      Social History:   Social History     Socioeconomic History    Marital status: /Civil Union     Spouse name: Not on file    Number of children: Not on file    Years of education: Not on file    Highest education level: Not on file   Occupational History    Not on file   Social Needs    Financial resource strain: Not on file    Food insecurity     Worry: Not on file     Inability: Not on file    Transportation needs     Medical: Not on file     Non-medical: Not on file   Tobacco Use    Smoking status: Never Smoker    Smokeless tobacco: Never Used   Substance and Sexual Activity    Alcohol use: Yes     Frequency: 2-3 times a week     Drinks per session: 1 or 2    Drug use: Never    Sexual activity: Not on file   Lifestyle    Physical activity     Days per week: Not on file     Minutes per session: Not on file    Stress: Not on file   Relationships    Social connections     Talks on phone: Not on file     Gets together: Not on file     Attends Mandaen service: Not on file     Active member of club or organization: Not on file     Attends meetings of clubs or organizations: Not on file     Relationship status: Not on file    Intimate partner violence     Fear of current or ex partner: Not on file     Emotionally abused: Not on file     Physically abused: Not on file     Forced sexual activity: Not on file   Other Topics Concern    Not on file   Social History Narrative    Not on file       Current Medications:   Current Outpatient Medications   Medication Sig Dispense Refill    Azelastine HCl 137 MCG/SPRAY SOLN into each nostril daily as needed       Bacillus Coagulans-Inulin (Probiotic-Prebiotic) 1-250 BILLION-MG CAPS Take by mouth daily      Calcium Carbonate-Vitamin D (Calcium 500 + D) 500-125 MG-UNIT TABS Take by mouth daily      desvenlafaxine succinate (PRISTIQ) 50 mg 24 hr tablet Take 50 mg by mouth daily       irbesartan (AVAPRO) 150 mg tablet Take 150 mg by mouth daily at bedtime      loratadine-pseudoephedrine (CLARITIN-D 24-HOUR)  mg per 24 hr tablet Take 1 tablet by mouth as needed for allergies      Multiple Vitamins-Minerals (MULTIVITAMIN WOMEN 50+ PO) Take by mouth daily       pantoprazole (PROTONIX) 40 mg tablet Take 1 tablet (40 mg total) by mouth daily 30 tablet 11    Turmeric Curcumin 500 MG CAPS Take by mouth daily      ursodiol (ACTIGALL) 500 MG tablet Take 2 tablets (1,000 mg total) by mouth 2 (two) times a day 120 tablet 11     No current facility-administered medications for this visit  Allergies:    Allergies   Allergen Reactions    Aspirin GI Intolerance

## 2021-01-15 NOTE — LETTER
January 15, 2021     Oscar Lucero DO  [address] [facility],[address]  [facility] [address]  [address] [facility] [address]    Patient: Scooby Ramírez   YOB: 1955   Date of Visit: 1/15/2021       Dear Dr Teena Palma: Thank you for referring Scooby Ramírez to me for evaluation  Below are my notes for this consultation  If you have questions, please do not hesitate to call me  I look forward to following your patient along with you  Sincerely,        Rocky Cartagena MD        CC: MD Rocky Arizmendi MD  1/15/2021 10:36 AM  Sign when Signing Visit  Hematology / Oncology Outpatient Consult Note    Scooby Ramíerz 76 y o  female [id number]         Date:  1/15/2021    Assessment / Plan:  A 48-year-old postmenopausal woman with newly diagnosed stage I A right breast cancer, grade 1 with mucinous histology, % positive, NC 25% positive, HER2 negative disease  She underwent lumpectomy and sentinel lymph node biopsy, resulting in TAMARA  She presents today with her  to discuss adjuvant treatment options  We had extensive discussion regarding the diagnosis, staging information, low-grade histology, tumor phenotype, very good prognosis and treatment options  Obviously, adjuvant chemotherapy is not indicated due to the low grade histology  I recommended her to have adjuvant hormonal therapy with anastrozole for 5 years  Side effects of anastrozole was thoroughly discussed, including but not limited to hot flashes, musculoskeletal symptom and bone mineral density loss  We also discussed the breast cancer outcome if she chose not to have adjuvant hormonal therapy  She decided to try anastrozole  I recommended her to continue with calcium vitamin-D on a regular basis  I will see her again in 6 months for routine follow-up            Subjective:     HPI:  A 76years old postmenopausal woman who was found to have radiographic abnormality in her right breast for which she underwent biopsy in September 24, 2020  She had invasive mucinous carcinoma, grade 1  This was % positive, MO 25% positive, HER2 negative disease  Subsequently, she underwent lumpectomy and sentinel lymph node biopsy by Dr Renee Sweeney in December 22, 2020 which showed 2 mm and 1 1 mm of mucinous carcinoma, grade 1  There was no evidence of lymphovascular invasion  1 sentinel lymph node was negative for metastatic disease  She presents today with her  to discuss adjuvant treatment options  She feels well with no pain  She has no respiratory symptoms  Her weight is stable  She has hypertension as well as primary biliary cirrhosis for which she is on ursodil  Her liver function test is normal   She has no family history of breast cancer  She is a lifetime never smoker  Her performance status is normal       Interval History:          Objective:     Primary Diagnosis:    Right breast cancer, stage I A (pT1a, pN0, M0) grade 1, mucinous histology, % positive, MO 25% positive, HER2 negative disease  Diagnosed in December 2020  Cancer Staging:  Cancer Staging  No matching staging information was found for the patient  Previous Hematologic/ Oncologic Treatment:         Current Hematologic/ Oncologic Treatment:      Adjuvant hormonal therapy with anastrozole to be started in January 2021  Disease Status:     TAMARA status post lumpectomy and sentinel lymph node biopsy  Test Results:    Pathology:    2 mm and 1 1 mm of invasive mucinous carcinoma, grade 1  No evidence of lymphovascular invasion  1 sentinel lymph node was negative for metastatic disease  % positive, MO 25% positive, HER2 negative disease  Stage I A (pT1a, pN0, M0)    Radiology:    Chest x-ray was negative for pulmonary disease  Laboratory:    See below      Physical Exam:      General Appearance:    Alert, oriented        Eyes:    PERRL   Ears:    Normal external ear canals, both ears   Nose:   Nares normal, septum midline Throat:   Mucosa moist  Pharynx without injection  Neck:   Supple       Lungs:     Clear to auscultation bilaterally   Chest Wall:    No tenderness or deformity    Heart:    Regular rate and rhythm       Abdomen:     Soft, non-tender, bowel sounds +, no organomegaly           Extremities:   Extremities no cyanosis or edema       Skin:   no rash or icterus  Lymph nodes:   Cervical, supraclavicular, and axillary nodes normal   Neurologic:   CNII-XII intact, normal strength, sensation and reflexes     Throughout          Breast exam:   Lumpectomy scar at outer upper quadrant of her right breast with no palpable abnormality  Left breast exam is negative  ROS: Review of Systems   All other systems reviewed and are negative  Imaging: Mammo Needle Localization Right ([employer])    Result Date: 12/22/2020  Narrative: MAMMOGRAPHY NEEDLE LOCALIZATION Indication: Right breast carcinoma  Localize stereotactic clip preoperatively Comparison: Prior imaging studies of the left breast Procedure: Informed consent was obtained prior to the procedure, discussing possible complications such as bleeding, infection, or allergic reaction  After verifying patient and side of interest and initialing side of concern (time out procedure), and utilizing digital mammographic guidance and sterile technique, a needle localization procedure of the stereotactic clip in the upper outer quadrant was performed from a lateral approach  The localizing  wire was positioned adjacent to the targeted stereotactic clip  A radiopaque skin marker was placed at the wire entry site  The patient tolerated the procedure well, leaving the department in satisfactory condition, with  guidance images available on PACS  Impression: Impression: Successful needle localization procedure of the targeted stereotactic clip   Workstation performed: [id number]     Nm Sentinal Node Inj    Result Date: 12/22/2020  Narrative: SENTINEL NODE LYMPHOSCINTIGRAPHY INDICATION:  C50 411: Malignant neoplasm of upper-outer quadrant of right female breast Z17 0: Estrogen receptor positive status (ER+)  , localize sentinel node  FINDINGS: 0 38 mCi Tc-99m sulfur colloid (0 6 cc volume) was administered intradermally into the right breast by Dr Mili Jiang in the OR  No imaging was performed  Impression: Injection of  0 38 mCi Tc-99m sulfur colloid into the right breast in the OR  Workstation performed: [id number]     Mammo Breast Specimen Right (no Charge)    Result Date: 12/23/2020  Narrative: MAMMOGRAPHY BREAST SPECIMEN INDICATION: Evaluation of right breast surgical specimen TECHNIQUE: 2 digital images submitted; images marked with respect to specimen margins  FINDINGS: The specimen includes the localizing wire  The stereotactic clip  in question is included in the specimen  Impression: The breast specimen includes the localizing wire and targeted stereotactic clip    Workstation performed: [id number]         Labs:   Lab Results   Component Value Date    WBC 6 81 12/03/2020    HGB 13 1 12/03/2020    HCT 39 7 12/03/2020    MCV 95 12/03/2020     12/03/2020     Lab Results   Component Value Date    K 3 8 12/03/2020     12/03/2020    CO2 26 12/03/2020    BUN 10 12/03/2020    CREATININE 0 72 12/03/2020    GLUF 100 (H) 07/07/2020    CALCIUM 9 2 12/03/2020    AST 16 12/03/2020    ALT 27 12/03/2020    ALKPHOS 110 12/03/2020    EGFR 88 12/03/2020         Vital Sign:    Body surface area is 1 99 meters squared      Wt Readings from Last 3 Encounters:   01/15/21 87 1 kg (192 lb)   01/04/21 88 9 kg (196 lb)   12/22/20 86 6 kg (191 lb)        Temp Readings from Last 3 Encounters:   01/15/21 98 8 °F (37 1 °C) (Tympanic Core)   01/04/21 97 8 °F (36 6 °C)   12/22/20 98 °F (36 7 °C)        BP Readings from Last 3 Encounters:   01/15/21 126/72   01/04/21 140/90   12/22/20 133/78         Pulse Readings from Last 3 Encounters:   01/15/21 82   01/04/21 82 12/22/20 92     @LASTSAO2(3)@    Active Problems:   Patient Active Problem List   Diagnosis    Personal history of colonic polyps    Gastroesophageal reflux disease without esophagitis    Primary biliary cirrhosis ([facility] )    Morales's esophagus without dysplasia    Malignant neoplasm of upper-outer quadrant of right breast in female, estrogen receptor positive ([facility] )       Past Medical History:   Past Medical History:   Diagnosis Date    Anxiety     Morales esophagus     Gastroesophageal reflux disease without esophagitis 5/14/2019    GERD (gastroesophageal reflux disease)     History of gallstones     Hypertension     Personal history of colonic polyps 5/14/2019    Primary biliary cirrhosis ([facility] ) 5/14/2019    Seasonal allergies        Surgical History:   Past Surgical History:   Procedure Laterality Date    APPENDECTOMY      BREAST LUMPECTOMY Right 12/22/2020    Procedure: BREAST NEEDLE LOCALIZED LUMPECTOMY (NEEDLE LOC AT 1230); Surgeon: Merrick Longo MD;  [facility];  Service: Surgical Oncology    COLONOSCOPY  06/24/2020     2 adenomas  Five year recall advised    INTRAOPERATIVE RADIATION THERAPY (IORT) Right 12/22/2020    Procedure: BREAST INTRAOPERATIVE RADIATION THERAPY (IORT) BY DR Estephania Alvarado;  Surgeon: Merrick Longo MD;  [facility];  Service: Surgical Oncology    LYMPH NODE BIOPSY Right 12/22/2020    Procedure: SENTINEL LYMPH NODE BIOPSY; LYMPHATIC MAPPING WITH BLUE DYE AND RADIOACTIVE DYE (INJECT AT 1400 BY DR KNOX IN THE OR); Surgeon: Merrick Longo MD;  [facility];  Service: Surgical Oncology    MAMMO NEEDLE LOCALIZATION RIGHT ([employer]) Right 12/22/2020    MAMMO STEREOTACTIC BREAST BIOPSY RIGHT ([employer]) Right 9/24/2020    TUBAL LIGATION      UPPER GASTROINTESTINAL ENDOSCOPY  06/24/2019    irregular z-line    biopsies negative for Morales's       Family History:    Family History   Problem Relation Age of Onset    No Known Problems Mother     Lung cancer Father 48    No Known Problems Sister     No Known Problems Brother     No Known Problems Daughter     No Known Problems Maternal Grandmother     No Known Problems Maternal Grandfather     No Known Problems Paternal Grandmother     No Known Problems Paternal Grandfather     No Known Problems Sister     No Known Problems Maternal Aunt     No Known Problems Paternal Aunt     No Known Problems Paternal Aunt        Cancer-related family history includes Lung cancer (age of onset: 48) in her father      Social History:   Social History     Socioeconomic History    Marital status: /Civil Union     Spouse name: Not on file    Number of children: Not on file    Years of education: Not on file    Highest education level: Not on file   Occupational History    Not on file   Social Needs    Financial resource strain: Not on file    Food insecurity     Worry: Not on file     Inability: Not on file   [employer] needs     Medical: Not on file     Non-medical: Not on file   Tobacco Use    Smoking status: Never Smoker    Smokeless tobacco: Never Used   Substance and Sexual Activity    Alcohol use: Yes     Frequency: 2-3 times a week     Drinks per session: 1 or 2    Drug use: Never    Sexual activity: Not on file   Lifestyle    Physical activity     Days per week: Not on file     Minutes per session: Not on file    Stress: Not on file   Relationships    Social connections     Talks on phone: Not on file     Gets together: Not on file     Attends [employer] service: Not on file     Active member of club or organization: Not on file     Attends meetings of clubs or organizations: Not on file     Relationship status: Not on file    Intimate partner violence     Fear of current or ex partner: Not on file     Emotionally abused: Not on file     Physically abused: Not on file     Forced sexual activity: Not on file   Other Topics Concern    Not on file   Social History Narrative    Not on file Current Medications:   Current Outpatient Medications   Medication Sig Dispense Refill    Azelastine HCl 137 MCG/SPRAY SOLN into each nostril daily as needed       Bacillus Coagulans-Inulin (Probiotic-Prebiotic) 1-250 BILLION-MG CAPS Take by mouth daily      Calcium Carbonate-Vitamin D (Calcium 500 + D) 500-125 MG-UNIT TABS Take by mouth daily      desvenlafaxine succinate (PRISTIQ) 50 mg 24 hr tablet Take 50 mg by mouth daily       irbesartan (AVAPRO) 150 mg tablet Take 150 mg by mouth daily at bedtime      loratadine-pseudoephedrine (CLARITIN-D 24-HOUR)  mg per 24 hr tablet Take 1 tablet by mouth as needed for allergies      Multiple Vitamins-Minerals (MULTIVITAMIN WOMEN 50+ PO) Take by mouth daily       pantoprazole (PROTONIX) 40 mg tablet Take 1 tablet (40 mg total) by mouth daily 30 tablet 11    Turmeric Curcumin 500 MG CAPS Take by mouth daily      ursodiol (ACTIGALL) 500 MG tablet Take 2 tablets (1,000 mg total) by mouth 2 (two) times a day 120 tablet 11     No current facility-administered medications for this visit  Allergies:    Allergies   Allergen Reactions    Aspirin GI Intolerance

## 2021-01-26 ENCOUNTER — RADIATION ONCOLOGY FOLLOW-UP (OUTPATIENT)
Dept: RADIATION ONCOLOGY | Facility: HOSPITAL | Age: 66
End: 2021-01-26
Attending: RADIOLOGY

## 2021-01-26 DIAGNOSIS — Z17.0 MALIGNANT NEOPLASM OF UPPER-OUTER QUADRANT OF RIGHT BREAST IN FEMALE, ESTROGEN RECEPTOR POSITIVE (HCC): Primary | ICD-10-CM

## 2021-01-26 DIAGNOSIS — C50.411 MALIGNANT NEOPLASM OF UPPER-OUTER QUADRANT OF RIGHT BREAST IN FEMALE, ESTROGEN RECEPTOR POSITIVE (HCC): Primary | ICD-10-CM

## 2021-01-26 DIAGNOSIS — Z79.811 USE OF ANASTROZOLE (ARIMIDEX): ICD-10-CM

## 2021-01-26 NOTE — PROGRESS NOTES
Follow-up - Radiation Oncology   Juan F Jones 1955 76 y o  female [id number]      History of Present Illness   Cancer Staging  See Below    Juan F Jones is a 76y o  year old female with a history of stage IA right breast carcinoma  Encounter provider Bud Sanchez MD     Provider located at [address] [address]     Recent Visits  No visits were found meeting these conditions  Showing recent visits within past 7 days and meeting all other requirements      Future Appointments  No visits were found meeting these conditions  Showing future appointments within next 150 days and meeting all other requirements         After connecting through [employer], the patient was identified by name and date of birth  Juan F Jones was informed that this is a telemedicine visit and that the visit is being conducted through telephone which may not be secure and therefore, might not be HIPAA-compliant  My office door was closed  No one else was in the room  She acknowledged consent and understanding of privacy and security of the video platform  The patient has agreed to participate and understands they can discontinue the visit at any time      It was my intent to perform this visit via video technology but the patient was not able to do a video connection so the visit was completed via audio telephone only  Subjective  Juan F Jones is a 76 y o  postmenopausal woman with newly diagnosed stage I A right breast cancer, grade 1 with mucinous histology, % positive, ND 25% positive, HER2 negative disease   She underwent lumpectomy and sentinel lymph node biopsy, resulting in TAMARA  Presents for one month post IORT follow up, completed on 12/22/2020  Interval History:   1/4/2021 Jennifer Bar: Giselle Jocelynn op visit  Doing well   F/U 3 months       1/15/2021 Consult Philly Douglas: adjuvant hormonal therapy with anastrozole for 5 years  She reports she is feeling well  She has minimal discomfort about her incision site that is improving  She denies any breast masses nor any pain  She started anastrozole about 1 week ago and denies any hot flashes  4/7/2021 Marisol Solorzano  7/16/2021 Vivi      Historical Information   Oncology History   Malignant neoplasm of upper-outer quadrant of right breast in female, estrogen receptor positive ([facility] )   9/24/2020 Biopsy    Right breast stereotactic biopsy  10 o'clock,   Mucinous carcinoma, 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, both 1 1 mm, 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  Grade 1    SD 25  HER2 1+  Lymphovascular invasion not identified    Concordant  Malignancy appears unifocal; calcifications span 2 3cm  US right axila has not been performed  Left breast US recommended   10/6/2020 B/L US - no right axillary adenopathy, benign findings in left breast      12/22/2020 Surgery    Right breast needle localized lumpectomy with sentinel lymph node biopsy  Mucinous carcinoma  Grade 1  1 1 mm  Extensive DCIS (at least 4 cm)  DCIS less than 1 mm form lateral posterior lumpectomy margin  0/1 Lymph node  Anatomic/Prognostic Stage IA     12/22/2020 - 12/22/2020 Radiation    IORT     Dr Hernan Torres     1/15/2021 -  Hormone Therapy    Anastrozole 1 mg daily    Dr Sophie Neves    (Treatments not in [facility])  Field Numbers [employer] Treatment Site Starting  Ending  Elapsed  Fraction Total  Overlap Site Overlap         Date Date Days Dose Dose   Dose    IORT 50 kVp  Rt Breast   12/22/20 12/22/20  0  2000 cGy  2000 cGy                                       Past Medical History:   Diagnosis Date    Anxiety     Morales esophagus     Gastroesophageal reflux disease without esophagitis 5/14/2019    GERD (gastroesophageal reflux disease)     History of gallstones     Hypertension     Personal history of colonic polyps 5/14/2019    Primary biliary cirrhosis ([facility] ) 5/14/2019    Seasonal allergies      Past Surgical History:   Procedure Laterality Date    APPENDECTOMY      BREAST LUMPECTOMY Right 12/22/2020    Procedure: BREAST NEEDLE LOCALIZED LUMPECTOMY (NEEDLE LOC AT 1230); Surgeon: Jose Delgado MD;  [facility];  Service: Surgical Oncology    COLONOSCOPY  06/24/2020     2 adenomas  Five year recall advised    INTRAOPERATIVE RADIATION THERAPY (IORT) Right 12/22/2020    Procedure: BREAST INTRAOPERATIVE RADIATION THERAPY (IORT) BY DR Nila High;  Surgeon: Jose Delgado MD;  [facility];  Service: Surgical Oncology    LYMPH NODE BIOPSY Right 12/22/2020    Procedure: SENTINEL LYMPH NODE BIOPSY; LYMPHATIC MAPPING WITH BLUE DYE AND RADIOACTIVE DYE (INJECT AT 1400 BY DR KNOX IN THE OR); Surgeon: Jose Delgado MD;  [facility];  Service: Surgical Oncology    MAMMO NEEDLE LOCALIZATION RIGHT (ALL INC) Right 12/22/2020    MAMMO STEREOTACTIC BREAST BIOPSY RIGHT (ALL INC) Right 9/24/2020    TUBAL LIGATION      UPPER GASTROINTESTINAL ENDOSCOPY  06/24/2019    irregular z-line    biopsies negative for Morales's       Social History   Social History     Substance and Sexual Activity   Alcohol Use Yes    Frequency: 2-3 times a week    Drinks per session: 1 or 2     Social History     Substance and Sexual Activity   Drug Use Never     Social History     Tobacco Use   Smoking Status Never Smoker   Smokeless Tobacco Never Used     Meds/Allergies     Current Outpatient Medications:     anastrozole (ARIMIDEX) 1 mg tablet, Take 1 tablet (1 mg total) by mouth daily, Disp: 90 tablet, Rfl: 1    Azelastine HCl 137 MCG/SPRAY SOLN, into each nostril daily as needed , Disp: , Rfl:     Bacillus Coagulans-Inulin (Probiotic-Prebiotic) 1-250 BILLION-MG CAPS, Take by mouth daily, Disp: , Rfl:     Calcium Carbonate-Vitamin D (Calcium 500 + D) 500-125 MG-UNIT TABS, Take by mouth daily, Disp: , Rfl:     desvenlafaxine succinate (PRISTIQ) 50 mg 24 hr tablet, Take 50 mg by mouth daily , Disp: , Rfl:     irbesartan (AVAPRO) 150 mg tablet, Take 150 mg by mouth daily at bedtime, Disp: , Rfl:     loratadine-pseudoephedrine (CLARITIN-D 24-HOUR)  mg per 24 hr tablet, Take 1 tablet by mouth as needed for allergies, Disp: , Rfl:     Multiple Vitamins-Minerals (MULTIVITAMIN WOMEN 50+ PO), Take by mouth daily , Disp: , Rfl:     pantoprazole (PROTONIX) 40 mg tablet, Take 1 tablet (40 mg total) by mouth daily, Disp: 30 tablet, Rfl: 11    Turmeric Curcumin 500 MG CAPS, Take by mouth daily, Disp: , Rfl:     ursodiol (ACTIGALL) 500 MG tablet, Take 2 tablets (1,000 mg total) by mouth 2 (two) times a day, Disp: 120 tablet, Rfl: 11  Allergies   Allergen Reactions    Aspirin GI Intolerance     Review of Systems  Constitutional: Negative  HENT: Negative  Eyes: Negative  Respiratory: Negative  Cardiovascular: Negative  Gastrointestinal: Negative  Endocrine: Negative  Genitourinary: Negative  Musculoskeletal:        Soreness at incision site  Denies swelling  Not red  No arm swelling  Skin: Negative  Allergic/Immunologic: Negative  Neurological: Negative  Hematological: Negative  Psychiatric/Behavioral: Negative  OBJECTIVE:   There were no vitals taken for this visit  Pain Assessment:  0  ECOG/Zubrod/WHO: 1 - Symptomatic but completely ambulatory    Physical Exam   Telemedicine phone visit, so no physical examination  RESULTS    Lab Results: No results found for this or any previous visit (from the past 672 hour(s))  Imaging Studies:No results found  Assessment/Plan:  No orders of the defined types were placed in this encounter  Lamin Altamirano is a 76y o  year old female with a stage IA right breast cancer, grade 1 with mucinous histology, % positive, MI 25% positive, HER2 negative disease   She underwent lumpectomy and sentinel lymph node biopsy on December 22, 2020 followed by intraoperative radiation therapy IORT    Her final pathology confirmed a grade 1 mucinous carcinoma measuring 1 1 mm with extensive DCIS (at least 4 cm) with DCIS less than 1 mm from the lateral posterior lumpectomy margin  The posterior margin was on muscle  There was 1 negative lymph node  She saw Dr Marcus Arauz for her small invasive mucinous carcinoma in a background of DCIS  He recommended anastrozole therapy for 5 years which was started last week  We discussed adjuvant post lumpectomy radiation therapy for her extensive DCIS with close margins  We discussed the acute side effects and the potential chronic complications of 5 weeks radiation therapy to the whole breast   She does not wish to pursue radiation therapy and is comfortable with taking the anastrozole therapy  She will return for follow-up in 6 months  Alexander Dean MD  1/26/2021,3:37 PM    Portions of the record may have been created with voice recognition software   Occasional wrong word or "sound a like" substitutions may have occurred due to the inherent limitations of voice recognition software   Read the chart carefully and recognize, using context, where substitutions have occurred

## 2021-01-26 NOTE — PROGRESS NOTES
Virtual Regular Visit    Problem List Items Addressed This Visit     None               Reason for visit is76year-old postmenopausal woman with newly diagnosed stage I A right breast cancer, grade 1 with mucinous histology, % positive, IL 25% positive, HER2 negative disease  She underwent lumpectomy and sentinel lymph node biopsy, resulting in TAMARA  Presents for one month post IORT follow up, completed on 12/22/2020 1/4/2021 Rometta Necessary: Bruce Lawrence op visit  Doing well  F/U 3 months  1/15/2021 Consult Kingsley Mayo: adjuvant hormonal therapy with anastrozole for 5 years  4/7/2021 Rometta Necessary  7/16/2021 Cami Mak provider Thomas Hernandez MD    Provider located at [address] [id number]    Recent Visits  No visits were found meeting these conditions  Showing recent visits within past 7 days and meeting all other requirements     Future Appointments  No visits were found meeting these conditions  Showing future appointments within next 150 days and meeting all other requirements        After connecting through [employer], the patient was identified by name and date of birth  Sarah Nicole was informed that this is a telemedicine visit and that the visit is being conducted through telephone which may not be secure and therefore, might not be HIPAA-compliant  My office door was closed  No one else was in the room  She acknowledged consent and understanding of privacy and security of the video platform  The patient has agreed to participate and understands they can discontinue the visit at any time  Subjective    Sarah Nicole is a 76 y o  female         Past Medical History:   Diagnosis Date    Anxiety     Morales esophagus     Gastroesophageal reflux disease without esophagitis 5/14/2019    GERD (gastroesophageal reflux disease)     History of gallstones     Hypertension     Personal history of colonic polyps 5/14/2019    Primary biliary cirrhosis ([facility] [address] ) 5/14/2019    Seasonal allergies        Past Surgical History:   Procedure Laterality Date    APPENDECTOMY      BREAST LUMPECTOMY Right 12/22/2020    Procedure: BREAST NEEDLE LOCALIZED LUMPECTOMY (NEEDLE LOC AT 1230); Surgeon: Mary Hansen MD;  [facility];  Service: Surgical Oncology    COLONOSCOPY  06/24/2020     2 adenomas  Five year recall advised    INTRAOPERATIVE RADIATION THERAPY (IORT) Right 12/22/2020    Procedure: BREAST INTRAOPERATIVE RADIATION THERAPY (IORT) BY DR Raman Da Silva;  Surgeon: Mary Hansen MD;  [facility];  Service: Surgical Oncology    LYMPH NODE BIOPSY Right 12/22/2020    Procedure: SENTINEL LYMPH NODE BIOPSY; LYMPHATIC MAPPING WITH BLUE DYE AND RADIOACTIVE DYE (INJECT AT 1400 BY DR KNOX IN THE OR); Surgeon: Mary Hansen MD;  [facility];  Service: Surgical Oncology    MAMMO NEEDLE LOCALIZATION RIGHT (ALL INC) Right 12/22/2020    MAMMO STEREOTACTIC BREAST BIOPSY RIGHT (ALL INC) Right 9/24/2020    TUBAL LIGATION      UPPER GASTROINTESTINAL ENDOSCOPY  06/24/2019    irregular z-line    biopsies negative for Morales's       Current Outpatient Medications   Medication Sig Dispense Refill    anastrozole (ARIMIDEX) 1 mg tablet Take 1 tablet (1 mg total) by mouth daily 90 tablet 1    Azelastine HCl 137 MCG/SPRAY SOLN into each nostril daily as needed       Bacillus Coagulans-Inulin (Probiotic-Prebiotic) 1-250 BILLION-MG CAPS Take by mouth daily      Calcium Carbonate-Vitamin D (Calcium 500 + D) 500-125 MG-UNIT TABS Take by mouth daily      desvenlafaxine succinate (PRISTIQ) 50 mg 24 hr tablet Take 50 mg by mouth daily       irbesartan (AVAPRO) 150 mg tablet Take 150 mg by mouth daily at bedtime      loratadine-pseudoephedrine (CLARITIN-D 24-HOUR)  mg per 24 hr tablet Take 1 tablet by mouth as needed for allergies      Multiple Vitamins-Minerals (MULTIVITAMIN WOMEN 50+ PO) Take by mouth daily       pantoprazole (PROTONIX) 40 mg tablet Take 1 tablet (40 mg total) by mouth daily 30 tablet 11    Turmeric Curcumin 500 MG CAPS Take by mouth daily      ursodiol (ACTIGALL) 500 MG tablet Take 2 tablets (1,000 mg total) by mouth 2 (two) times a day 120 tablet 11     No current facility-administered medications for this visit  Allergies   Allergen Reactions    Aspirin GI Intolerance         I spent 20 minutes with the patient during this visit  Follow up visit       Oncology History Overview Note   53-year-old postmenopausal woman with newly diagnosed stage I A right breast cancer, grade 1 with mucinous histology, % positive, NM 25% positive, HER2 negative disease  She underwent lumpectomy and sentinel lymph node biopsy, resulting in TAMARA  Presents for one month post IORT follow up, completed on 12/22/2020 1/4/2021 Young Bare: Robby Beverly op visit  Doing well  F/U 3 months  1/15/2021 Consult Jaquan Sol: adjuvant hormonal therapy with anastrozole for 5 years  Malignant neoplasm of upper-outer quadrant of right breast in female, estrogen receptor positive ([facility] [address] )   9/24/2020 Biopsy    Right breast stereotactic biopsy  10 o'clock,   Mucinous carcinoma, 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, both 1 1 mm, 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  Grade 1    NM 25  HER2 1+  Lymphovascular invasion not identified    Concordant  Malignancy appears unifocal; calcifications span 2 3cm  US right axila has not been performed  Left breast US recommended   10/6/2020 B/L US - no right axillary adenopathy, benign findings in left breast      12/22/2020 Surgery    Right breast needle localized lumpectomy with sentinel lymph node biopsy  Mucinous carcinoma  Grade 1  1 1 mm  Extensive DCIS (at least 4 cm)  DCIS less than 1 mm form lateral posterior lumpectomy margin  0/1 Lymph node  Anatomic/Prognostic Stage IA     12/22/2020 - 12/22/2020 Radiation    IORT     Dr Servando Matias     1/15/2021 -  Hormone Therapy    Anastrozole 1 mg daily    Dr Elias Olmstead    (Treatments not in [facility])  Field Numbers Energy Treatment Site Starting  Ending  Elapsed  Fraction Total  Overlap Site Overlap         Date Date Days Dose Dose   Dose    IORT 50 kVp  Rt Breast   12/22/20 12/22/20  0  2000 cGy  2000 cGy                                       Clinical Trial: no      Health Maintenance   Topic Date Due    Hepatitis C Screening  1955    Hepatitis A Vaccine (1 of 2 - Risk 2-dose series) 07/20/1955    BMI: Followup Plan  07/20/1972    Annual Physical  07/20/1972    Hepatitis B Vaccine (1 of 3 - Risk 3-dose series) 07/20/1973    DTaP,Tdap,and Td Vaccines (1 - Tdap) 07/20/1975    Colorectal Cancer Screening  07/20/2004    Fall Risk  07/20/2019    Pneumococcal Vaccine: 65+ Years (1 of 1 - PPSV23) 07/20/2019    Influenza Vaccine (1) 09/01/2020    MAMMOGRAM  09/09/2021    Depression Screening PHQ  11/10/2021    BMI: Adult  01/15/2022    HIB Vaccine  Aged Out    IPV Vaccine  Aged Out    Meningococcal ACWY Vaccine  Aged Out    HPV Vaccine  Aged Out       Patient Active Problem List   Diagnosis    Personal history of colonic polyps    Gastroesophageal reflux disease without esophagitis    Primary biliary cirrhosis ([address] )    Morales's esophagus without dysplasia    Malignant neoplasm of upper-outer quadrant of right breast in female, estrogen receptor positive ([address] )     Past Medical History:   Diagnosis Date    Anxiety     Morales esophagus     Gastroesophageal reflux disease without esophagitis 5/14/2019    GERD (gastroesophageal reflux disease)     History of gallstones     Hypertension     Personal history of colonic polyps 5/14/2019    Primary biliary cirrhosis ([address] ) 5/14/2019    Seasonal allergies      Past Surgical History:   Procedure Laterality Date    APPENDECTOMY      BREAST LUMPECTOMY Right 12/22/2020    Procedure: BREAST NEEDLE LOCALIZED LUMPECTOMY (NEEDLE LOC AT 1230);   Surgeon: Valdez Hsu ДМИТРИЙ MALDONADO MD;  [facility];  Service: Surgical Oncology    COLONOSCOPY  06/24/2020     2 adenomas  Five year recall advised    INTRAOPERATIVE RADIATION THERAPY (IORT) Right 12/22/2020    Procedure: BREAST INTRAOPERATIVE RADIATION THERAPY (IORT) BY DR Rosa Connolly;  Surgeon: Terri Quintero MD;  [facility];  Service: Surgical Oncology    LYMPH NODE BIOPSY Right 12/22/2020    Procedure: SENTINEL LYMPH NODE BIOPSY; LYMPHATIC MAPPING WITH BLUE DYE AND RADIOACTIVE DYE (INJECT AT 1400 BY DR KNOX IN THE OR); Surgeon: Terri Quintero MD;  [facility];  Service: Surgical Oncology    MAMMO NEEDLE LOCALIZATION RIGHT (ALL INC) Right 12/22/2020    MAMMO STEREOTACTIC BREAST BIOPSY RIGHT (ALL INC) Right 9/24/2020    TUBAL LIGATION      UPPER GASTROINTESTINAL ENDOSCOPY  06/24/2019    irregular z-line    biopsies negative for Morales's     Family History   Problem Relation Age of Onset    No Known Problems Mother     Lung cancer Father 48    No Known Problems Sister     No Known Problems Brother     No Known Problems Daughter     No Known Problems Maternal Grandmother     No Known Problems Maternal Grandfather     No Known Problems Paternal Grandmother     No Known Problems Paternal Grandfather     No Known Problems Sister     No Known Problems Maternal Aunt     No Known Problems Paternal Aunt     No Known Problems Paternal Aunt      Social History     Socioeconomic History    Marital status: /Civil Union     Spouse name: Not on file    Number of children: Not on file    Years of education: Not on file    Highest education level: Not on file   Occupational History    Not on file   Social Needs    Financial resource strain: Not on file    Food insecurity     Worry: Not on file     Inability: Not on file    Transportation needs     Medical: Not on file     Non-medical: Not on file   Tobacco Use    Smoking status: Never Smoker    Smokeless tobacco: Never Used   Substance and Sexual Activity    Alcohol use: Yes     Frequency: 2-3 times a week     Drinks per session: 1 or 2    Drug use: Never    Sexual activity: Not on file   Lifestyle    Physical activity     Days per week: Not on file     Minutes per session: Not on file    Stress: Not on file   Relationships    Social connections     Talks on phone: Not on file     Gets together: Not on file     Attends Jewish service: Not on file     Active member of club or organization: Not on file     Attends meetings of clubs or organizations: Not on file     Relationship status: Not on file    Intimate partner violence     Fear of current or ex partner: Not on file     Emotionally abused: Not on file     Physically abused: Not on file     Forced sexual activity: Not on file   Other Topics Concern    Not on file   Social History Narrative    Not on file       Current Outpatient Medications:     anastrozole (ARIMIDEX) 1 mg tablet, Take 1 tablet (1 mg total) by mouth daily, Disp: 90 tablet, Rfl: 1    Azelastine HCl 137 MCG/SPRAY SOLN, into each nostril daily as needed , Disp: , Rfl:     Bacillus Coagulans-Inulin (Probiotic-Prebiotic) 1-250 BILLION-MG CAPS, Take by mouth daily, Disp: , Rfl:     Calcium Carbonate-Vitamin D (Calcium 500 + D) 500-125 MG-UNIT TABS, Take by mouth daily, Disp: , Rfl:     desvenlafaxine succinate (PRISTIQ) 50 mg 24 hr tablet, Take 50 mg by mouth daily , Disp: , Rfl:     irbesartan (AVAPRO) 150 mg tablet, Take 150 mg by mouth daily at bedtime, Disp: , Rfl:     loratadine-pseudoephedrine (CLARITIN-D 24-HOUR)  mg per 24 hr tablet, Take 1 tablet by mouth as needed for allergies, Disp: , Rfl:     Multiple Vitamins-Minerals (MULTIVITAMIN WOMEN 50+ PO), Take by mouth daily , Disp: , Rfl:     pantoprazole (PROTONIX) 40 mg tablet, Take 1 tablet (40 mg total) by mouth daily, Disp: 30 tablet, Rfl: 11    Turmeric Curcumin 500 MG CAPS, Take by mouth daily, Disp: , Rfl:     ursodiol (ACTIGALL) 500 MG tablet, Take 2 tablets (1,000 mg total) by mouth 2 (two) times a day, Disp: 120 tablet, Rfl: 11  Allergies   Allergen Reactions    Aspirin GI Intolerance       Review of Systems:  Review of Systems   Constitutional: Negative  HENT: Negative  Eyes: Negative  Respiratory: Negative  Cardiovascular: Negative  Gastrointestinal: Negative  Endocrine: Negative  Genitourinary: Negative  Musculoskeletal:        Soreness at incision site  Denies swelling  Not red  No arm swelling  Skin: Negative  Allergic/Immunologic: Negative  Neurological: Negative  Hematological: Negative  Psychiatric/Behavioral: Negative  There were no vitals filed for this visit  Imaging:No results found      Teaching

## 2021-02-14 DIAGNOSIS — K74.3 PRIMARY BILIARY CIRRHOSIS (HCC): ICD-10-CM

## 2021-02-15 RX ORDER — URSODIOL 500 MG/1
TABLET, FILM COATED ORAL
Qty: 360 TABLET | Refills: 4 | Status: SHIPPED | OUTPATIENT
Start: 2021-02-15 | End: 2022-04-20 | Stop reason: SDUPTHER

## 2021-04-19 ENCOUNTER — OFFICE VISIT (OUTPATIENT)
Dept: SURGICAL ONCOLOGY | Facility: CLINIC | Age: 66
End: 2021-04-19
Payer: COMMERCIAL

## 2021-04-19 VITALS
WEIGHT: 193.5 LBS | TEMPERATURE: 97.1 F | HEIGHT: 67 IN | DIASTOLIC BLOOD PRESSURE: 80 MMHG | RESPIRATION RATE: 12 BRPM | SYSTOLIC BLOOD PRESSURE: 122 MMHG | BODY MASS INDEX: 30.37 KG/M2 | HEART RATE: 92 BPM

## 2021-04-19 DIAGNOSIS — Z79.811 USE OF ANASTROZOLE (ARIMIDEX): ICD-10-CM

## 2021-04-19 DIAGNOSIS — Z17.0 MALIGNANT NEOPLASM OF UPPER-OUTER QUADRANT OF RIGHT BREAST IN FEMALE, ESTROGEN RECEPTOR POSITIVE (HCC): Primary | ICD-10-CM

## 2021-04-19 DIAGNOSIS — C50.411 MALIGNANT NEOPLASM OF UPPER-OUTER QUADRANT OF RIGHT BREAST IN FEMALE, ESTROGEN RECEPTOR POSITIVE (HCC): Primary | ICD-10-CM

## 2021-04-19 PROCEDURE — 99214 OFFICE O/P EST MOD 30 MIN: CPT | Performed by: SURGERY

## 2021-04-19 RX ORDER — ZOLPIDEM TARTRATE 10 MG/1
10 TABLET ORAL
COMMUNITY
Start: 2021-03-30

## 2021-04-19 NOTE — PROGRESS NOTES
Surgical Oncology Follow Up       [address]  [facility] [place]  [address]  [facility] [address]    Olya Dunham  1955  [id number]  [address]  [facility] [place]  [address] [facility] [address]    Chief Complaint   Patient presents with    Follow-up     3 month           Assessment & Plan:    patient presents for a follow-up visit  She is now on anastrozole and tolerating that well  She has seen Dr Bridget Matt  She also Radiation Oncology,  She had intraoperative radiation therapy and decided that she did not want to receive   additional radiation  She has no complaints referable to her breast     Cancer History:     Oncology History   Malignant neoplasm of upper-outer quadrant of right breast in female, estrogen receptor positive ([facility] [address] )   9/24/2020 Biopsy    Right breast stereotactic biopsy  10 o'clock,   Mucinous carcinoma, 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, both 1 1 mm, 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  Grade 1    TX 25  HER2 1+  Lymphovascular invasion not identified    Concordant  Malignancy appears unifocal; calcifications span 2 3cm  US right axila has not been performed  Left breast US recommended   10/6/2020 B/L US - no right axillary adenopathy, benign findings in left breast      12/22/2020 Surgery    Right breast needle localized lumpectomy with sentinel lymph node biopsy  Mucinous carcinoma  Grade 1  1 1 mm  Extensive DCIS (at least 4 cm)  DCIS less than 1 mm form lateral posterior lumpectomy margin  0/1 Lymph node  Anatomic/Prognostic Stage IA     12/22/2020 - 12/22/2020 Radiation    IORT     Dr Dylan Villanueva     1/15/2021 -  Hormone Therapy    Anastrozole 1 mg daily    Dr Son Saravia    [facility]  Field Numbers Energy Treatment Site Starting  Ending  Elapsed  Fraction Total  Overlap Site Overlap         Date Date Days Dose Dose   Dose    IORT 50 kVp Rt Breast   12/22/20 12/22/20  0  2000 cGy  2000 cGy                                         Interval History:    overall the patient is doing well  She is tolerating her anastrozole  Review of Systems:   Review of Systems   All other systems reviewed and are negative  Past Medical History     Patient Active Problem List   Diagnosis    Personal history of colonic polyps    Gastroesophageal reflux disease without esophagitis    Primary biliary cirrhosis ([facility] [address] )    Morales's esophagus without dysplasia    Malignant neoplasm of upper-outer quadrant of right breast in female, estrogen receptor positive ([facility] [address] )    Use of anastrozole (Arimidex)     Past Medical History:   Diagnosis Date    Anxiety     Morales esophagus     Gastroesophageal reflux disease without esophagitis 5/14/2019    GERD (gastroesophageal reflux disease)     History of gallstones     Hypertension     Personal history of colonic polyps 5/14/2019    Primary biliary cirrhosis ([facility] [address] ) 5/14/2019    Seasonal allergies      Past Surgical History:   Procedure Laterality Date    APPENDECTOMY      BREAST LUMPECTOMY Right 12/22/2020    Procedure: BREAST NEEDLE LOCALIZED LUMPECTOMY (NEEDLE LOC AT 1230); Surgeon: Jazmin Patrick MD;  [facility];  Service: Surgical Oncology    COLONOSCOPY  06/24/2020     2 adenomas  Five year recall advised    INTRAOPERATIVE RADIATION THERAPY (IORT) Right 12/22/2020    Procedure: BREAST INTRAOPERATIVE RADIATION THERAPY (IORT) BY DR Trupti Wills;  Surgeon: Jazmin Patrick MD;  [facility];  Service: Surgical Oncology    LYMPH NODE BIOPSY Right 12/22/2020    Procedure: SENTINEL LYMPH NODE BIOPSY; LYMPHATIC MAPPING WITH BLUE DYE AND RADIOACTIVE DYE (INJECT AT 1400 BY DR KNOX IN THE OR);   Surgeon: Jazmin Patrick MD;  [facility];  Service: Surgical Oncology    MAMMO NEEDLE LOCALIZATION RIGHT (ALL INC) Right 12/22/2020    MAMMO STEREOTACTIC BREAST BIOPSY RIGHT (ALL INC) Right 9/24/2020    TUBAL LIGATION      UPPER GASTROINTESTINAL ENDOSCOPY  06/24/2019    irregular z-line    biopsies negative for Morales's     Family History   Problem Relation Age of Onset    No Known Problems Mother     Lung cancer Father 48    No Known Problems Sister     No Known Problems Brother     No Known Problems Daughter     No Known Problems Maternal Grandmother     No Known Problems Maternal Grandfather     No Known Problems Paternal Grandmother     No Known Problems Paternal Grandfather     No Known Problems Sister     No Known Problems Maternal Aunt     No Known Problems Paternal Aunt     No Known Problems Paternal Aunt      Social History     Socioeconomic History    Marital status: /Civil Union     Spouse name: Not on file    Number of children: Not on file    Years of education: Not on file    Highest education level: Not on file   Occupational History    Not on file   Social Needs    Financial resource strain: Not on file    Food insecurity     Worry: Not on file     Inability: Not on file   [employer] needs     Medical: Not on file     Non-medical: Not on file   Tobacco Use    Smoking status: Never Smoker    Smokeless tobacco: Never Used   Substance and Sexual Activity    Alcohol use: Yes     Frequency: 2-3 times a week     Drinks per session: 1 or 2    Drug use: Never    Sexual activity: Not on file   Lifestyle    Physical activity     Days per week: Not on file     Minutes per session: Not on file    Stress: Not on file   Relationships    Social connections     Talks on phone: Not on file     Gets together: Not on file     Attends Yarsani service: Not on file     Active member of club or organization: Not on file     Attends meetings of clubs or organizations: Not on file     Relationship status: Not on file    Intimate partner violence     Fear of current or ex partner: Not on file     Emotionally abused: Not on file     Physically abused: Not on file     Forced sexual activity: Not on file   Other Topics Concern    Not on file   Social History Narrative    Not on file       Current Outpatient Medications:     anastrozole (ARIMIDEX) 1 mg tablet, Take 1 tablet (1 mg total) by mouth daily, Disp: 90 tablet, Rfl: 1    Azelastine HCl 137 MCG/SPRAY SOLN, into each nostril daily as needed , Disp: , Rfl:     Bacillus Coagulans-Inulin (Probiotic-Prebiotic) 1-250 BILLION-MG CAPS, Take by mouth daily, Disp: , Rfl:     Calcium Carbonate-Vitamin D (Calcium 500 + D) 500-125 MG-UNIT TABS, Take by mouth daily, Disp: , Rfl:     desvenlafaxine succinate (PRISTIQ) 50 mg 24 hr tablet, Take 50 mg by mouth daily , Disp: , Rfl:     irbesartan (AVAPRO) 150 mg tablet, Take 150 mg by mouth daily at bedtime, Disp: , Rfl:     loratadine-pseudoephedrine (CLARITIN-D 24-HOUR)  mg per 24 hr tablet, Take 1 tablet by mouth as needed for allergies, Disp: , Rfl:     Multiple Vitamins-Minerals (MULTIVITAMIN WOMEN 50+ PO), Take by mouth daily , Disp: , Rfl:     pantoprazole (PROTONIX) 40 mg tablet, Take 1 tablet (40 mg total) by mouth daily, Disp: 30 tablet, Rfl: 11    Turmeric Curcumin 500 MG CAPS, Take by mouth daily, Disp: , Rfl:     ursodiol (ACTIGALL) 500 MG tablet, TAKE 2 TABLETS BY MOUTH TWICE A DAY, Disp: 360 tablet, Rfl: 4    zolpidem (AMBIEN) 10 mg tablet, Take 10 mg by mouth daily at bedtime, Disp: , Rfl:   Allergies   Allergen Reactions    Aspirin GI Intolerance       Physical Exam:     Vitals:    04/19/21 1121   BP: 122/80   Pulse: 92   Resp: 12   Temp: (!) 97 1 °F (36 2 °C)     Physical Exam  Vitals signs reviewed  Constitutional:       Appearance: She is well-developed  HENT:      Head: Normocephalic and atraumatic  Eyes:      Pupils: Pupils are equal, round, and reactive to light  Neck:      Musculoskeletal: Normal range of motion and neck supple  Thyroid: No thyromegaly  Vascular: No JVD  Trachea: No tracheal deviation     Cardiovascular:      Rate and Rhythm: Normal rate and regular rhythm  Heart sounds: Normal heart sounds  No murmur  No friction rub  No gallop  Pulmonary:      Effort: Pulmonary effort is normal  No respiratory distress  Breath sounds: Normal breath sounds  No wheezing or rales  Chest:          Comments:   Examination the right breast demonstrates a well-healed scar  There is a slight puckering at the anterior edge which seems to be in the subcutaneous tissue may be from her surgical knot  There is no axillary adenopathy  The left breast was examined in the sitting and supine position  There are no worrisome skin changes, tenderness, inverted nipple, nipple discharge, swelling, bleeding or evidence of a mass in any quadrant  May survey demonstrated no evidence of any clinically suspicious axillary, pectoral or paraclavicular lymph nodes  Abdominal:      General: There is no distension  Palpations: Abdomen is soft  There is no hepatomegaly or mass  Tenderness: There is no abdominal tenderness  There is no guarding or rebound  Musculoskeletal: Normal range of motion  General: No tenderness  Lymphadenopathy:      Cervical: No cervical adenopathy  Skin:     General: Skin is warm and dry  Findings: No erythema or rash  Neurological:      Mental Status: She is alert and oriented to person, place, and time  Cranial Nerves: No cranial nerve deficit  Psychiatric:         Behavior: Behavior normal            Results & Discussion:   Patient overall is doing well  Will coordinate a mammogram for her in August   I will see her back in 6 months  Advance Care Planning/Advance Directives:  I discussed the disease status, treatment plans and follow-up with the patient

## 2021-05-06 ENCOUNTER — APPOINTMENT (EMERGENCY)
Dept: RADIOLOGY | Facility: HOSPITAL | Age: 66
DRG: 177 | End: 2021-05-06
Payer: COMMERCIAL

## 2021-05-06 ENCOUNTER — HOSPITAL ENCOUNTER (INPATIENT)
Facility: HOSPITAL | Age: 66
LOS: 1 days | Discharge: HOME | DRG: 177 | End: 2021-05-07
Attending: EMERGENCY MEDICINE | Admitting: INTERNAL MEDICINE
Payer: COMMERCIAL

## 2021-05-06 DIAGNOSIS — U07.1 PNEUMONIA DUE TO COVID-19 VIRUS: Primary | ICD-10-CM

## 2021-05-06 DIAGNOSIS — J12.82 PNEUMONIA DUE TO COVID-19 VIRUS: Primary | ICD-10-CM

## 2021-05-06 LAB
ALBUMIN SERPL BCP-MCNC: 3.1 G/DL (ref 3.5–5)
ALP SERPL-CCNC: 238 U/L (ref 46–116)
ALT SERPL W P-5'-P-CCNC: 40 U/L (ref 12–78)
ANION GAP SERPL CALCULATED.3IONS-SCNC: 15 MMOL/L (ref 4–13)
AST SERPL W P-5'-P-CCNC: 32 U/L (ref 5–45)
ATRIAL RATE: 88 BPM
BACTERIA UR QL AUTO: NORMAL /HPF
BASOPHILS # BLD AUTO: 0.03 THOUSANDS/ΜL (ref 0–0.1)
BASOPHILS NFR BLD AUTO: 0 % (ref 0–1)
BILIRUB SERPL-MCNC: 0.6 MG/DL (ref 0.2–1)
BILIRUB UR QL STRIP: NEGATIVE
BUN SERPL-MCNC: 10 MG/DL (ref 5–25)
CALCIUM ALBUM COR SERPL-MCNC: 9.8 MG/DL (ref 8.3–10.1)
CALCIUM SERPL-MCNC: 9.1 MG/DL (ref 8.3–10.1)
CHLORIDE SERPL-SCNC: 97 MMOL/L (ref 100–108)
CK SERPL-CCNC: 53 U/L (ref 26–192)
CLARITY UR: CLEAR
CO2 SERPL-SCNC: 21 MMOL/L (ref 21–32)
COLOR UR: ABNORMAL
CREAT SERPL-MCNC: 0.64 MG/DL (ref 0.6–1.3)
CRP SERPL QL: 48.6 MG/L
D DIMER PPP FEU-MCNC: 0.66 UG/ML FEU
EOSINOPHIL # BLD AUTO: 0.01 THOUSAND/ΜL (ref 0–0.61)
EOSINOPHIL NFR BLD AUTO: 0 % (ref 0–6)
ERYTHROCYTE [DISTWIDTH] IN BLOOD BY AUTOMATED COUNT: 12.1 % (ref 11.6–15.1)
FERRITIN SERPL-MCNC: 223 NG/ML (ref 8–388)
GFR SERPL CREATININE-BSD FRML MDRD: 93 ML/MIN/1.73SQ M
GLUCOSE SERPL-MCNC: 110 MG/DL (ref 65–140)
GLUCOSE UR STRIP-MCNC: NEGATIVE MG/DL
HCT VFR BLD AUTO: 38.6 % (ref 34.8–46.1)
HGB BLD-MCNC: 13.4 G/DL (ref 11.5–15.4)
HGB UR QL STRIP.AUTO: NEGATIVE
IMM GRANULOCYTES # BLD AUTO: 0.06 THOUSAND/UL (ref 0–0.2)
IMM GRANULOCYTES NFR BLD AUTO: 1 % (ref 0–2)
INR PPP: 0.94 (ref 0.84–1.19)
KETONES UR STRIP-MCNC: ABNORMAL MG/DL
LACTATE SERPL-SCNC: 1.1 MMOL/L (ref 0.5–2)
LEUKOCYTE ESTERASE UR QL STRIP: ABNORMAL
LYMPHOCYTES # BLD AUTO: 1.63 THOUSANDS/ΜL (ref 0.6–4.47)
LYMPHOCYTES NFR BLD AUTO: 23 % (ref 14–44)
MAGNESIUM SERPL-MCNC: 1.6 MG/DL (ref 1.6–2.6)
MCH RBC QN AUTO: 31.2 PG (ref 26.8–34.3)
MCHC RBC AUTO-ENTMCNC: 34.7 G/DL (ref 31.4–37.4)
MCV RBC AUTO: 90 FL (ref 82–98)
MONOCYTES # BLD AUTO: 0.57 THOUSAND/ΜL (ref 0.17–1.22)
MONOCYTES NFR BLD AUTO: 8 % (ref 4–12)
NEUTROPHILS # BLD AUTO: 4.69 THOUSANDS/ΜL (ref 1.85–7.62)
NEUTS SEG NFR BLD AUTO: 68 % (ref 43–75)
NITRITE UR QL STRIP: NEGATIVE
NON-SQ EPI CELLS URNS QL MICRO: NORMAL /HPF
NRBC BLD AUTO-RTO: 0 /100 WBCS
NT-PROBNP SERPL-MCNC: 50 PG/ML
P AXIS: 62 DEGREES
PH UR STRIP.AUTO: 6.5 [PH]
PLATELET # BLD AUTO: 351 THOUSANDS/UL (ref 149–390)
PMV BLD AUTO: 10 FL (ref 8.9–12.7)
POTASSIUM SERPL-SCNC: 3.6 MMOL/L (ref 3.5–5.3)
PR INTERVAL: 132 MS
PROCALCITONIN SERPL-MCNC: <0.05 NG/ML
PROT SERPL-MCNC: 8 G/DL (ref 6.4–8.2)
PROT UR STRIP-MCNC: ABNORMAL MG/DL
PROTHROMBIN TIME: 12.6 SECONDS (ref 11.6–14.5)
QRS AXIS: 68 DEGREES
QRSD INTERVAL: 84 MS
QT INTERVAL: 364 MS
QTC INTERVAL: 440 MS
RBC # BLD AUTO: 4.29 MILLION/UL (ref 3.81–5.12)
RBC #/AREA URNS AUTO: NORMAL /HPF
SODIUM SERPL-SCNC: 133 MMOL/L (ref 136–145)
SP GR UR STRIP.AUTO: 1.02 (ref 1–1.03)
T WAVE AXIS: 47 DEGREES
TROPONIN I SERPL-MCNC: <0.02 NG/ML
UROBILINOGEN UR QL STRIP.AUTO: 1 E.U./DL
VENTRICULAR RATE: 88 BPM
WBC # BLD AUTO: 6.99 THOUSAND/UL (ref 4.31–10.16)
WBC #/AREA URNS AUTO: NORMAL /HPF

## 2021-05-06 PROCEDURE — XW033E5 INTRODUCTION OF REMDESIVIR ANTI-INFECTIVE INTO PERIPHERAL VEIN, PERCUTANEOUS APPROACH, NEW TECHNOLOGY GROUP 5: ICD-10-PCS | Performed by: INTERNAL MEDICINE

## 2021-05-06 PROCEDURE — 85379 FIBRIN DEGRADATION QUANT: CPT | Performed by: PHYSICIAN ASSISTANT

## 2021-05-06 PROCEDURE — 82550 ASSAY OF CK (CPK): CPT | Performed by: PHYSICIAN ASSISTANT

## 2021-05-06 PROCEDURE — 87040 BLOOD CULTURE FOR BACTERIA: CPT | Performed by: PHYSICIAN ASSISTANT

## 2021-05-06 PROCEDURE — 85610 PROTHROMBIN TIME: CPT | Performed by: PHYSICIAN ASSISTANT

## 2021-05-06 PROCEDURE — 93005 ELECTROCARDIOGRAM TRACING: CPT

## 2021-05-06 PROCEDURE — 85025 COMPLETE CBC W/AUTO DIFF WBC: CPT | Performed by: PHYSICIAN ASSISTANT

## 2021-05-06 PROCEDURE — 81001 URINALYSIS AUTO W/SCOPE: CPT | Performed by: PHYSICIAN ASSISTANT

## 2021-05-06 PROCEDURE — 83605 ASSAY OF LACTIC ACID: CPT | Performed by: PHYSICIAN ASSISTANT

## 2021-05-06 PROCEDURE — 99223 1ST HOSP IP/OBS HIGH 75: CPT | Performed by: INTERNAL MEDICINE

## 2021-05-06 PROCEDURE — 83735 ASSAY OF MAGNESIUM: CPT | Performed by: PHYSICIAN ASSISTANT

## 2021-05-06 PROCEDURE — 71045 X-RAY EXAM CHEST 1 VIEW: CPT

## 2021-05-06 PROCEDURE — 99285 EMERGENCY DEPT VISIT HI MDM: CPT

## 2021-05-06 PROCEDURE — 99285 EMERGENCY DEPT VISIT HI MDM: CPT | Performed by: PHYSICIAN ASSISTANT

## 2021-05-06 PROCEDURE — 93010 ELECTROCARDIOGRAM REPORT: CPT | Performed by: INTERNAL MEDICINE

## 2021-05-06 PROCEDURE — 82728 ASSAY OF FERRITIN: CPT | Performed by: PHYSICIAN ASSISTANT

## 2021-05-06 PROCEDURE — 84484 ASSAY OF TROPONIN QUANT: CPT | Performed by: PHYSICIAN ASSISTANT

## 2021-05-06 PROCEDURE — 80053 COMPREHEN METABOLIC PANEL: CPT | Performed by: PHYSICIAN ASSISTANT

## 2021-05-06 PROCEDURE — 36415 COLL VENOUS BLD VENIPUNCTURE: CPT | Performed by: PHYSICIAN ASSISTANT

## 2021-05-06 PROCEDURE — 86140 C-REACTIVE PROTEIN: CPT | Performed by: PHYSICIAN ASSISTANT

## 2021-05-06 PROCEDURE — 84145 PROCALCITONIN (PCT): CPT | Performed by: PHYSICIAN ASSISTANT

## 2021-05-06 PROCEDURE — 83880 ASSAY OF NATRIURETIC PEPTIDE: CPT | Performed by: PHYSICIAN ASSISTANT

## 2021-05-06 RX ORDER — FAMOTIDINE 20 MG/1
20 TABLET, FILM COATED ORAL 2 TIMES DAILY
Status: DISCONTINUED | OUTPATIENT
Start: 2021-05-06 | End: 2021-05-07 | Stop reason: HOSPADM

## 2021-05-06 RX ORDER — LOSARTAN POTASSIUM 50 MG/1
50 TABLET ORAL DAILY
Status: DISCONTINUED | OUTPATIENT
Start: 2021-05-06 | End: 2021-05-07 | Stop reason: HOSPADM

## 2021-05-06 RX ORDER — ACETAMINOPHEN 325 MG/1
650 TABLET ORAL EVERY 6 HOURS PRN
Status: DISCONTINUED | OUTPATIENT
Start: 2021-05-06 | End: 2021-05-07 | Stop reason: HOSPADM

## 2021-05-06 RX ORDER — B-COMPLEX WITH VITAMIN C
1 TABLET ORAL DAILY
Status: DISCONTINUED | OUTPATIENT
Start: 2021-05-06 | End: 2021-05-07 | Stop reason: HOSPADM

## 2021-05-06 RX ORDER — PANTOPRAZOLE SODIUM 40 MG/1
40 TABLET, DELAYED RELEASE ORAL DAILY
Status: DISCONTINUED | OUTPATIENT
Start: 2021-05-07 | End: 2021-05-07 | Stop reason: HOSPADM

## 2021-05-06 RX ORDER — ZOLPIDEM TARTRATE 5 MG/1
10 TABLET ORAL
Status: DISCONTINUED | OUTPATIENT
Start: 2021-05-06 | End: 2021-05-07 | Stop reason: HOSPADM

## 2021-05-06 RX ORDER — ASCORBIC ACID 500 MG
1000 TABLET ORAL EVERY 12 HOURS SCHEDULED
Status: DISCONTINUED | OUTPATIENT
Start: 2021-05-06 | End: 2021-05-07 | Stop reason: HOSPADM

## 2021-05-06 RX ORDER — MULTIVITAMIN/IRON/FOLIC ACID 18MG-0.4MG
1 TABLET ORAL DAILY
Status: DISCONTINUED | OUTPATIENT
Start: 2021-05-13 | End: 2021-05-07 | Stop reason: HOSPADM

## 2021-05-06 RX ORDER — SACCHAROMYCES BOULARDII 250 MG
250 CAPSULE ORAL 2 TIMES DAILY
Status: DISCONTINUED | OUTPATIENT
Start: 2021-05-07 | End: 2021-05-07 | Stop reason: HOSPADM

## 2021-05-06 RX ORDER — DESVENLAFAXINE 50 MG/1
50 TABLET, EXTENDED RELEASE ORAL DAILY
Status: DISCONTINUED | OUTPATIENT
Start: 2021-05-07 | End: 2021-05-07 | Stop reason: HOSPADM

## 2021-05-06 RX ORDER — LORATADINE AND PSEUDOEPHEDRINE 10; 240 MG/1; MG/1
1 TABLET, EXTENDED RELEASE ORAL DAILY PRN
Status: DISCONTINUED | OUTPATIENT
Start: 2021-05-07 | End: 2021-05-07 | Stop reason: HOSPADM

## 2021-05-06 RX ORDER — ANASTROZOLE 1 MG/1
1 TABLET ORAL DAILY
Status: DISCONTINUED | OUTPATIENT
Start: 2021-05-07 | End: 2021-05-07 | Stop reason: HOSPADM

## 2021-05-06 RX ORDER — ALBUTEROL SULFATE 90 UG/1
1 AEROSOL, METERED RESPIRATORY (INHALATION) EVERY 4 HOURS PRN
COMMUNITY

## 2021-05-06 RX ORDER — ALBUTEROL SULFATE 90 UG/1
1 AEROSOL, METERED RESPIRATORY (INHALATION) EVERY 4 HOURS PRN
Status: DISCONTINUED | OUTPATIENT
Start: 2021-05-06 | End: 2021-05-07 | Stop reason: HOSPADM

## 2021-05-06 RX ORDER — MELATONIN
2000 DAILY
Status: DISCONTINUED | OUTPATIENT
Start: 2021-05-06 | End: 2021-05-07 | Stop reason: HOSPADM

## 2021-05-06 RX ORDER — ZINC SULFATE 50(220)MG
220 CAPSULE ORAL DAILY
Status: DISCONTINUED | OUTPATIENT
Start: 2021-05-06 | End: 2021-05-07 | Stop reason: HOSPADM

## 2021-05-06 RX ADMIN — OXYCODONE HYDROCHLORIDE AND ACETAMINOPHEN 1000 MG: 500 TABLET ORAL at 16:40

## 2021-05-06 RX ADMIN — LOSARTAN POTASSIUM 50 MG: 50 TABLET, FILM COATED ORAL at 18:34

## 2021-05-06 RX ADMIN — FAMOTIDINE 20 MG: 20 TABLET, FILM COATED ORAL at 18:34

## 2021-05-06 RX ADMIN — Medication 1 TABLET: at 15:35

## 2021-05-06 RX ADMIN — ALBUTEROL SULFATE 1 PUFF: 90 AEROSOL, METERED RESPIRATORY (INHALATION) at 16:39

## 2021-05-06 RX ADMIN — Medication 2000 UNITS: at 16:40

## 2021-05-06 RX ADMIN — REMDESIVIR 200 MG: 100 INJECTION, POWDER, LYOPHILIZED, FOR SOLUTION INTRAVENOUS at 15:35

## 2021-05-06 RX ADMIN — ACETAMINOPHEN 650 MG: 325 TABLET, FILM COATED ORAL at 20:49

## 2021-05-06 RX ADMIN — ENOXAPARIN SODIUM 30 MG: 100 INJECTION SUBCUTANEOUS at 16:39

## 2021-05-06 RX ADMIN — ZINC SULFATE 220 MG (50 MG) CAPSULE 220 MG: CAPSULE at 16:40

## 2021-05-06 RX ADMIN — OXYCODONE HYDROCHLORIDE AND ACETAMINOPHEN 1000 MG: 500 TABLET ORAL at 20:48

## 2021-05-06 NOTE — ED PROVIDER NOTES
History  Chief Complaint   Patient presents with    Shortness of Breath     Patient presents to the ER with increased shortness of breath  Patient was confirmed COVID positive on 4/28/21  Patient is a 77 y/o F with h/o GERD, HTN, Primary biliary cirrhosis that presents to the ED with worsening SOB  SHe states she was diagnosed with covid on 4/28 and her SOB is worsening  She had a fever for 1 days  She has had myalgias, chest pain, headaches, fatigue, weakness, and a cough  Patient has been taking Vit D, Vit C, and multivitamin  History provided by:  Patient  Shortness of Breath  Severity:  Moderate  Onset quality:  Gradual  Duration:  8 days  Timing:  Constant  Progression:  Worsening  Chronicity:  New  Context comment:  Covid +  Relieved by:  Nothing  Worsened by:  Nothing  Ineffective treatments:  None tried  Associated symptoms: chest pain, cough and headaches    Associated symptoms: no abdominal pain, no fever, no rash, no sore throat, no sputum production and no vomiting    Risk factors: no hx of PE/DVT, no oral contraceptive use, no prolonged immobilization, no recent surgery and no tobacco use        Prior to Admission Medications   Prescriptions Last Dose Informant Patient Reported? Taking?    Azelastine HCl 137 MCG/SPRAY SOLN 5/6/2021 at Unknown time Self Yes Yes   Sig: into each nostril daily as needed    Bacillus Coagulans-Inulin (Probiotic-Prebiotic) 1-250 BILLION-MG CAPS 5/6/2021 at Unknown time Self Yes Yes   Sig: Take by mouth daily   Calcium Carbonate-Vitamin D (Calcium 500 + D) 500-125 MG-UNIT TABS 5/6/2021 at Unknown time Self Yes Yes   Sig: Take by mouth daily   Multiple Vitamins-Minerals (MULTIVITAMIN WOMEN 50+ PO) 5/6/2021 at Unknown time Self Yes Yes   Sig: Take by mouth daily    Turmeric Curcumin 500 MG CAPS 5/6/2021 at Unknown time Self Yes Yes   Sig: Take by mouth daily   albuterol (PROVENTIL HFA,VENTOLIN HFA) 90 mcg/act inhaler 5/6/2021 at Unknown time  Yes Yes   Sig: Inhale 1 puff every 4 (four) hours as needed for wheezing   anastrozole (ARIMIDEX) 1 mg tablet 5/6/2021 at Unknown time Self No Yes   Sig: Take 1 tablet (1 mg total) by mouth daily   desvenlafaxine succinate (PRISTIQ) 50 mg 24 hr tablet 5/6/2021 at Unknown time Self Yes Yes   Sig: Take 50 mg by mouth daily    irbesartan (AVAPRO) 150 mg tablet 5/6/2021 at Unknown time Self Yes Yes   Sig: Take 150 mg by mouth daily at bedtime   loratadine-pseudoephedrine (CLARITIN-D 24-HOUR)  mg per 24 hr tablet 5/6/2021 at Unknown time Self Yes Yes   Sig: Take 1 tablet by mouth as needed for allergies   pantoprazole (PROTONIX) 40 mg tablet 5/6/2021 at Unknown time Self No Yes   Sig: Take 1 tablet (40 mg total) by mouth daily   ursodiol (ACTIGALL) 500 MG tablet 5/6/2021 at Unknown time Self No Yes   Sig: TAKE 2 TABLETS BY MOUTH TWICE A DAY   zolpidem (AMBIEN) 10 mg tablet 5/6/2021 at Unknown time Self Yes Yes   Sig: Take 10 mg by mouth daily at bedtime      Facility-Administered Medications: None       Past Medical History:   Diagnosis Date    Anxiety     Morales esophagus     Gastroesophageal reflux disease without esophagitis 5/14/2019    GERD (gastroesophageal reflux disease)     History of gallstones     Hypertension     Personal history of colonic polyps 5/14/2019    Primary biliary cirrhosis ([facility] [address] ) 5/14/2019    Seasonal allergies        Past Surgical History:   Procedure Laterality Date    APPENDECTOMY      BREAST LUMPECTOMY Right 12/22/2020    Procedure: BREAST NEEDLE LOCALIZED LUMPECTOMY (NEEDLE LOC AT 1230); Surgeon: Jai Frances MD;  [facility];  Service: Surgical Oncology    COLONOSCOPY  06/24/2020     2 adenomas    Five year recall advised    INTRAOPERATIVE RADIATION THERAPY (IORT) Right 12/22/2020    Procedure: BREAST INTRAOPERATIVE RADIATION THERAPY (IORT) BY DR Ana Felix;  Surgeon: Jai Frances MD;  [facility];  Service: Surgical Oncology    LYMPH NODE BIOPSY Right 12/22/2020    Procedure: SENTINEL LYMPH NODE BIOPSY; LYMPHATIC MAPPING WITH BLUE DYE AND RADIOACTIVE DYE (INJECT AT 1400 BY DR KNOX IN THE OR); Surgeon: Mann Elliott MD;  [facility];  Service: Surgical Oncology    MAMMO NEEDLE LOCALIZATION RIGHT (ALL INC) Right 12/22/2020    MAMMO STEREOTACTIC BREAST BIOPSY RIGHT (ALL INC) Right 9/24/2020    TUBAL LIGATION      UPPER GASTROINTESTINAL ENDOSCOPY  06/24/2019    irregular z-line  biopsies negative for Morales's       Family History   Problem Relation Age of Onset    No Known Problems Mother     Lung cancer Father 48    No Known Problems Sister     No Known Problems Brother     No Known Problems Daughter     No Known Problems Maternal Grandmother     No Known Problems Maternal Grandfather     No Known Problems Paternal Grandmother     No Known Problems Paternal Grandfather     No Known Problems Sister     No Known Problems Maternal Aunt     No Known Problems Paternal Aunt     No Known Problems Paternal Aunt      I have reviewed and agree with the history as documented  E-Cigarette/Vaping    E-Cigarette Use Never User      E-Cigarette/Vaping Substances     Social History     Tobacco Use    Smoking status: Never Smoker    Smokeless tobacco: Never Used   Substance Use Topics    Alcohol use: Yes     Frequency: 2-3 times a week     Drinks per session: 1 or 2    Drug use: Never       Review of Systems   Constitutional: Positive for chills and fatigue  Negative for fever  HENT: Negative for congestion and sore throat  Eyes: Negative for visual disturbance  Respiratory: Positive for cough and shortness of breath  Negative for sputum production  Cardiovascular: Positive for chest pain  Negative for palpitations and leg swelling  Gastrointestinal: Negative for abdominal pain, diarrhea, nausea and vomiting  Musculoskeletal: Positive for myalgias  Skin: Positive for pallor  Negative for color change and rash     Neurological: Positive for weakness, light-headedness and headaches  Negative for seizures, speech difficulty and numbness  Psychiatric/Behavioral: Negative for confusion  All other systems reviewed and are negative  Physical Exam  Physical Exam  Vitals signs and nursing note reviewed  Constitutional:       General: She is in acute distress  Appearance: Normal appearance  She is well-developed and well-groomed  She is ill-appearing  She is not diaphoretic  HENT:      Head: Normocephalic and atraumatic  Right Ear: Hearing and external ear normal       Left Ear: Hearing and external ear normal       Nose: Nose normal    Eyes:      Conjunctiva/sclera: Conjunctivae normal       Pupils: Pupils are equal    Neck:      Musculoskeletal: Normal range of motion  Cardiovascular:      Rate and Rhythm: Normal rate and regular rhythm  Heart sounds: Normal heart sounds  Pulmonary:      Effort: Tachypnea present  Breath sounds: Decreased breath sounds present  No wheezing, rhonchi or rales  Abdominal:      General: Abdomen is flat  Bowel sounds are normal       Palpations: Abdomen is soft  Tenderness: There is no abdominal tenderness  Musculoskeletal: Normal range of motion  General: No tenderness  Right lower leg: No edema  Left lower leg: No edema  Skin:     General: Skin is warm and dry  Coloration: Skin is pale  Skin is not jaundiced  Findings: No rash  Neurological:      General: No focal deficit present  Mental Status: She is alert and oriented to person, place, and time  GCS: GCS eye subscore is 4  GCS verbal subscore is 5  GCS motor subscore is 6  Sensory: Sensation is intact  Motor: No weakness  Psychiatric:         Mood and Affect: Mood normal          Speech: Speech normal          Behavior: Behavior is cooperative           Vital Signs  ED Triage Vitals [05/06/21 1036]   Temperature Pulse Respirations Blood Pressure SpO2   99 2 °F (37 3 °C) 95 20 110/68 92 % Temp Source Heart Rate Source Patient Position - Orthostatic VS BP Location FiO2 (%)   Oral Monitor Lying Right arm --      Pain Score       No Pain           Vitals:    05/06/21 1036 05/06/21 1230   BP: 110/68 110/66   Pulse: 95 82   Patient Position - Orthostatic VS: Lying          Visual Acuity      ED Medications  Medications - No data to display    Diagnostic Studies  Results Reviewed     Procedure Component Value Units Date/Time    Urine Microscopic [[id number]]  (Normal) Collected: 05/06/21 1217    Lab Status: Final result Specimen: Urine, Clean Catch Updated: 05/06/21 1251     RBC, UA None Seen /hpf      WBC, UA None Seen /hpf      Epithelial Cells None Seen /hpf      Bacteria, UA None Seen /hpf     UA w Reflex to Microscopic w Reflex to Culture [[id number]]  (Abnormal) Collected: 05/06/21 1217    Lab Status: Final result Specimen: Urine, Clean Catch Updated: 05/06/21 1233     Color, UA Orange     Clarity, UA Clear     Specific [place], UA 1 020     pH, UA 6 5     Leukocytes, UA Trace     Nitrite, UA Negative     Protein, UA 30 (1+) mg/dl      Glucose, UA Negative mg/dl      Ketones, UA Trace mg/dl      Urobilinogen, UA 1 0 E U /dl      Bilirubin, UA Negative     Blood, UA Negative    Magnesium [[id number]]  (Normal) Collected: 05/06/21 1133    Lab Status: Final result Specimen: Blood from Arm, Right Updated: 05/06/21 1211     Magnesium 1 6 mg/dL     C-reactive protein [[id number]]  (Abnormal) Collected: 05/06/21 1133    Lab Status: Final result Specimen: Blood from Arm, Right Updated: 05/06/21 1211     CRP 48 6 mg/L     NT-BNP PRO [[id number]]  (Normal) Collected: 05/06/21 1133    Lab Status: Final result Specimen: Blood from Arm, Right Updated: 05/06/21 1211     NT-proBNP 50 pg/mL     CK (with reflex to MB) [[id number]]  (Normal) Collected: 05/06/21 1133    Lab Status: Final result Specimen: Blood from Arm, Right Updated: 05/06/21 1211     Total CK 53 U/L     Troponin I [[id number]]  (Normal) Collected: 05/06/21 1133    Lab Status: Final result Specimen: Blood from Arm, Right Updated: 05/06/21 1209     Troponin I <0 02 ng/mL     Lactic acid, plasma [[id number]]  (Normal) Collected: 05/06/21 1133    Lab Status: Final result Specimen: Blood from Arm, Right Updated: 05/06/21 1206     LACTIC ACID 1 1 mmol/L     Narrative:      Result may be elevated if tourniquet was used during collection      Comprehensive metabolic panel [[id number]]  (Abnormal) Collected: 05/06/21 1133    Lab Status: Final result Specimen: Blood from Arm, Right Updated: 05/06/21 1205     Sodium 133 mmol/L      Potassium 3 6 mmol/L      Chloride 97 mmol/L      CO2 21 mmol/L      ANION GAP 15 mmol/L      BUN 10 mg/dL      Creatinine 0 64 mg/dL      Glucose 110 mg/dL      Calcium 9 1 mg/dL      Corrected Calcium 9 8 mg/dL      AST 32 U/L      ALT 40 U/L      Alkaline Phosphatase 238 U/L      Total Protein 8 0 g/dL      Albumin 3 1 g/dL      Total Bilirubin 0 60 mg/dL      eGFR 93 ml/min/1 73sq m     Narrative:      [facility] guidelines for Chronic Kidney Disease (CKD):     Stage 1 with normal or high GFR (GFR > 90 mL/min/1 73 square meters)    Stage 2 Mild CKD (GFR = 60-89 mL/min/1 73 square meters)    Stage 3A Moderate CKD (GFR = 45-59 mL/min/1 73 square meters)    Stage 3B Moderate CKD (GFR = 30-44 mL/min/1 73 square meters)    Stage 4 Severe CKD (GFR = 15-29 mL/min/1 73 square meters)    Stage 5 End Stage CKD (GFR <15 mL/min/1 73 square meters)  Note: GFR calculation is accurate only with a steady state creatinine    D-dimer, quantitative [[id number]]  (Abnormal) Collected: 05/06/21 1133    Lab Status: Final result Specimen: Blood from Arm, Right Updated: 05/06/21 1203     D-Dimer, Quant 0 66 ug/ml FEU     Protime-INR [[id number]]  (Normal) Collected: 05/06/21 1133    Lab Status: Final result Specimen: Blood from Arm, Right Updated: 05/06/21 1155     Protime 12 6 seconds      INR 0 94    Blood culture #1 [[id number]] Collected: 05/06/21 1142    Lab Status: In process Specimen: Blood from Arm, Left Updated: 05/06/21 1150    CBC and differential [[id number]] Collected: 05/06/21 1133    Lab Status: Final result Specimen: Blood from Arm, Right Updated: 05/06/21 1141     WBC 6 99 Thousand/uL      RBC 4 29 Million/uL      Hemoglobin 13 4 g/dL      Hematocrit 38 6 %      MCV 90 fL      MCH 31 2 pg      MCHC 34 7 g/dL      RDW 12 1 %      MPV 10 0 fL      Platelets 559 Thousands/uL      nRBC 0 /100 WBCs      Neutrophils Relative 68 %      Immat GRANS % 1 %      Lymphocytes Relative 23 %      Monocytes Relative 8 %      Eosinophils Relative 0 %      Basophils Relative 0 %      Neutrophils Absolute 4 69 Thousands/µL      Immature Grans Absolute 0 06 Thousand/uL      Lymphocytes Absolute 1 63 Thousands/µL      Monocytes Absolute 0 57 Thousand/µL      Eosinophils Absolute 0 01 Thousand/µL      Basophils Absolute 0 03 Thousands/µL     Blood culture #2 [[id number]] Collected: 05/06/21 1133    Lab Status: In process Specimen: Blood from Arm, Right Updated: 05/06/21 1139    Procalcitonin with AM Reflex [[id number]] Collected: 05/06/21 1133    Lab Status:  In process Specimen: Blood from Arm, Right Updated: 05/06/21 1138                 XR chest 1 view portable   ED Interpretation by Jodi Sal PA-C (05/06 1141)   Viral pneumonia                 Procedures  ECG 12 Lead Documentation Only    Date/Time: 5/6/2021 11:55 AM  Performed by: Jodi Sal PA-C  Authorized by: Jodi Sal PA-C     Indications / Diagnosis:  SOB  ECG reviewed by me, the ED Provider: yes    Patient location:  ED  Previous ECG:     Previous ECG:  Compared to current    Similarity:  No change  Rate:     ECG rate:  88  Rhythm:     Rhythm: sinus rhythm    Conduction:     Conduction: normal    ST segments:     ST segments:  Normal  T waves:     T waves: normal               ED Course  ED Course as of May 06 1307   Thu May 06, 2021   1224 SLIM paged for admission  1240 Patient accepted by Dr Nadir Judd, she would like to hold off on steroids or abx  MDM  Number of Diagnoses or Management Options  Pneumonia due to COVID-19 virus: established and worsening  Diagnosis management comments: Patient with covid pneumonia, worsening SOB, pulse ox below 92% at rest, will admit  Amount and/or Complexity of Data Reviewed  Clinical lab tests: ordered and reviewed  Tests in the radiology section of CPT®: ordered and reviewed  Discuss the patient with other providers: yes (Dr Ko Rowland)    Patient Progress  Patient progress: stable      Disposition  Final diagnoses:   Pneumonia due to COVID-19 virus     Time reflects when diagnosis was documented in both MDM as applicable and the Disposition within this note     Time User Action Codes Description Comment    5/6/2021 12:39 PM Uzair Marx Add [U07 1,  J12 82] Pneumonia due to COVID-19 virus       ED Disposition     ED Disposition Condition Date/Time Comment    Admit Stable Thu May 6, 2021 12:39 PM Case was discussed with Dr Ko Rowland and the patient's admission status was agreed to be Admission Status: inpatient status to the service of Dr Ko Rowland   Follow-up Information    None         Patient's Medications   Discharge Prescriptions    No medications on file     No discharge procedures on file      PDMP Review       Value Time User    PDMP Reviewed  Yes 12/3/2020  9:51 AM Prasanna Moser MD          ED Provider  Electronically Signed by           Henry Nicholas PA-C  05/06/21 8003

## 2021-05-06 NOTE — ASSESSMENT & PLAN NOTE
· Status post lumpectomy 12/22/2020  · Continue anastrozole  · Will treat COVID-19 with remdesivir even the patient is not currently requiring supplemental oxygen

## 2021-05-06 NOTE — H&P
[place]  H&P- Vita Lara 1955, 76 y o  female MRN: [id number]  Unit/Bed#: ED 02 Encounter: [id number]  Primary Care Provider: Chavo Covarrubias DO   Date and time admitted to hospital: 5/6/2021 10:31 AM    * COVID-19  Assessment & Plan  · Patient saturating 95% on room air during admission, provide supplemental oxygen if needed  · Continue with incentive spirometry  · Patient reports worsening shortness of breath over the past few days  · Admitted under mild pathway  · Continue vitamin-D, vitamin-C and zinc  · Continue remdesivir, patient has a history of malignancy  · Continue Pepcid  · Await procalcitonin before initiating antibiotic therapy given patient's stability  · CRP:  40 6   · Ferritin:  Pending  · D-dimer:  0 66  · Continue b i d  Lovenox  · ProBNP:  40  · CK:  53  · Troponin: <0 02    Malignant neoplasm of upper-outer quadrant of right breast in female, estrogen receptor positive ([facility] )  Assessment & Plan  · Status post lumpectomy 12/22/2020  · Continue anastrozole  · Will treat COVID-19 with remdesivir even the patient is not currently requiring supplemental oxygen    Morales's esophagus without dysplasia  Assessment & Plan  · Continue acid suppressant therapy    Primary biliary cirrhosis ([facility] )  Assessment & Plan  · Continue Ursodiol, we do not carry patient's home dose  · Patient will bring in from home    VTE Prophylaxis: Enoxaparin (Lovenox)  / sequential compression device   Code Status: Full Code  POLST: POLST form is not discussed and not completed at this time  Discussion with family:  Discussed with patient's  at bedside  Answered all questions to the best of my ability  Anticipated Length of Stay:  Patient will be admitted on an Inpatient basis with an anticipated length of stay of  greater than 2 midnights     Justification for Hospital Stay:  IV management of COVID-19 pneumonia    Total Time for Visit, including Counseling / Coordination of Care: 60 minutes  Greater than 50% of this total time spent on direct patient counseling and coordination of care  Chief Complaint:   Worsening shortness of breath    History of Present Illness:    Ricky Cheung is a 76 y o  female with past medical history of GERD, hypertension, primary biliary cirrhosis, breast cancer status post lumpectomy who presents with worsening shortness of breath after being diagnosed with COVID 4/28  Patient reports a history of fever x1 day, myalgias, chest pain, headaches, fatigue and weakness as well as cough  Patient has been taking vitamin-D, vitamin-C and multivitamin at home  Patient saturating in the low-mid 90s on room air  Will admit and treat with remdesivir as well as mild COVID vitamin cocktail  Follow-up on procalcitonin, if positive would consider initiating antibiotic therapy though given patient's stability, would not initiate steroids or antibiotics at this time  Will start on remdesivir given history of malignancy  Review of Systems:    Review of Systems    Past Medical and Surgical History:     Past Medical History:   Diagnosis Date    Anxiety     Morales esophagus     Gastroesophageal reflux disease without esophagitis 5/14/2019    GERD (gastroesophageal reflux disease)     History of gallstones     Hypertension     Personal history of colonic polyps 5/14/2019    Primary biliary cirrhosis ([facility] ) 5/14/2019    Seasonal allergies        Past Surgical History:   Procedure Laterality Date    APPENDECTOMY      BREAST LUMPECTOMY Right 12/22/2020    Procedure: BREAST NEEDLE LOCALIZED LUMPECTOMY (NEEDLE LOC AT 1230); Surgeon: Rosaura Valdes MD;  [facility];  Service: Surgical Oncology    COLONOSCOPY  06/24/2020     2 adenomas    Five year recall advised    INTRAOPERATIVE RADIATION THERAPY (IORT) Right 12/22/2020    Procedure: BREAST INTRAOPERATIVE RADIATION THERAPY (IORT) BY DR Ismael Rust;  Surgeon: Rosaura Valdes MD;  [facility]; Service: Surgical Oncology    LYMPH NODE BIOPSY Right 12/22/2020    Procedure: SENTINEL LYMPH NODE BIOPSY; LYMPHATIC MAPPING WITH BLUE DYE AND RADIOACTIVE DYE (INJECT AT 1400 BY DR KNOX IN THE OR); Surgeon: Prabhakar Jonas MD;  [facility];  Service: Surgical Oncology    MAMMO NEEDLE LOCALIZATION RIGHT (ALL INC) Right 12/22/2020    MAMMO STEREOTACTIC BREAST BIOPSY RIGHT (ALL INC) Right 9/24/2020    TUBAL LIGATION      UPPER GASTROINTESTINAL ENDOSCOPY  06/24/2019    irregular z-line  biopsies negative for Morales's       Meds/Allergies:    Prior to Admission medications    Medication Sig Start Date End Date Taking?  Authorizing Provider   albuterol (PROVENTIL HFA,VENTOLIN HFA) 90 mcg/act inhaler Inhale 1 puff every 4 (four) hours as needed for wheezing   Yes Historical Provider, MD   anastrozole (ARIMIDEX) 1 mg tablet Take 1 tablet (1 mg total) by mouth daily 1/15/21  Yes Nash Tavares MD   Azelastine HCl 137 MCG/SPRAY SOLN into each nostril daily as needed    Yes Historical Provider, MD   Bacillus Coagulans-Inulin (Probiotic-Prebiotic) 1-250 BILLION-MG CAPS Take by mouth daily   Yes Historical Provider, MD   Calcium Carbonate-Vitamin D (Calcium 500 + D) 500-125 MG-UNIT TABS Take by mouth daily   Yes Historical Provider, MD   desvenlafaxine succinate (PRISTIQ) 50 mg 24 hr tablet Take 50 mg by mouth daily    Yes Historical Provider, MD   irbesartan (AVAPRO) 150 mg tablet Take 150 mg by mouth daily at bedtime   Yes Historical Provider, MD   loratadine-pseudoephedrine (CLARITIN-D 24-HOUR)  mg per 24 hr tablet Take 1 tablet by mouth as needed for allergies   Yes Historical Provider, MD   Multiple Vitamins-Minerals (MULTIVITAMIN WOMEN 50+ PO) Take by mouth daily    Yes Historical Provider, MD   pantoprazole (PROTONIX) 40 mg tablet Take 1 tablet (40 mg total) by mouth daily 8/26/20  Yes SRIKANTH [place]   Turmeric Curcumin 500 MG CAPS Take by mouth daily   Yes Historical Provider, MD   ursodiol (ACTIGALL) 500 MG tablet TAKE 2 TABLETS BY MOUTH TWICE A DAY 2/15/21  Yes Daina Noriega MD   zolpidem (AMBIEN) 10 mg tablet Take 10 mg by mouth daily at bedtime 3/30/21  Yes Historical Provider, MD     I have reviewed home medications with patient personally  Allergies: Allergies   Allergen Reactions    Aspirin GI Intolerance       Social History:     Marital Status: /Civil Union   Occupation:   Patient Pre-hospital Living Situation: home  Patient Pre-hospital Level of Mobility: independent  Patient Pre-hospital Diet Restrictions: vegetarian  Substance Use History:   Social History     Substance and Sexual Activity   Alcohol Use Yes    Frequency: 2-3 times a week    Drinks per session: 1 or 2     Social History     Tobacco Use   Smoking Status Never Smoker   Smokeless Tobacco Never Used     Social History     Substance and Sexual Activity   Drug Use Never       Family History:    Family History   Problem Relation Age of Onset    No Known Problems Mother     Lung cancer Father 48    No Known Problems Sister     No Known Problems Brother     No Known Problems Daughter     No Known Problems Maternal Grandmother     No Known Problems Maternal Grandfather     No Known Problems Paternal Grandmother     No Known Problems Paternal Grandfather     No Known Problems Sister     No Known Problems Maternal Aunt     No Known Problems Paternal Aunt     No Known Problems Paternal Aunt        Physical Exam:     Vitals:   Blood Pressure: 110/66 (05/06/21 1230)  Pulse: 82 (05/06/21 1230)  Temperature: 99 2 °F (37 3 °C) (05/06/21 1036)  Temp Source: Oral (05/06/21 1036)  Respirations: (!) 24 (05/06/21 1230)  Height: 5' 7 5" (171 5 cm) (05/06/21 1036)  Weight - Scale: 85 3 kg (188 lb) (05/06/21 1036)  SpO2: 93 % (05/06/21 1230)    Physical Exam  Vitals signs and nursing note reviewed  Constitutional:       General: She is not in acute distress  Appearance: Normal appearance  She is well-developed  HENT:      Head: Normocephalic and atraumatic  Eyes:      General: No scleral icterus  Conjunctiva/sclera: Conjunctivae normal    Cardiovascular:      Rate and Rhythm: Normal rate and regular rhythm  Heart sounds: No murmur  Pulmonary:      Effort: Pulmonary effort is normal       Breath sounds: Decreased air movement present  Decreased breath sounds present  No wheezing, rhonchi or rales  Abdominal:      General: There is no distension  Palpations: Abdomen is soft  Skin:     General: Skin is warm and dry  Neurological:      General: No focal deficit present  Mental Status: She is alert  Psychiatric:         Mood and Affect: Mood normal            Additional Data:     Lab Results: I have personally reviewed pertinent reports  Results from last 7 days   Lab Units 05/06/21  1133   WBC Thousand/uL 6 99   HEMOGLOBIN g/dL 13 4   HEMATOCRIT % 38 6   PLATELETS Thousands/uL 351   NEUTROS PCT % 68   LYMPHS PCT % 23   MONOS PCT % 8   EOS PCT % 0     Results from last 7 days   Lab Units 05/06/21  1133   SODIUM mmol/L 133*   POTASSIUM mmol/L 3 6   CHLORIDE mmol/L 97*   CO2 mmol/L 21   BUN mg/dL 10   CREATININE mg/dL 0 64   ANION GAP mmol/L 15*   CALCIUM mg/dL 9 1   ALBUMIN g/dL 3 1*   TOTAL BILIRUBIN mg/dL 0 60   ALK PHOS U/L 238*   ALT U/L 40   AST U/L 32   GLUCOSE RANDOM mg/dL 110     Results from last 7 days   Lab Units 05/06/21  1133   INR  0 94             Results from last 7 days   Lab Units 05/06/21  1133   LACTIC ACID mmol/L 1 1       Imaging: I have personally reviewed pertinent reports  XR chest 1 view portable   ED Interpretation by Ab Ko PA-C (05/06 1141)   Viral pneumonia          EKG, Pathology, and Other Studies Reviewed on Admission:   · EKG: NSR,     Allscripts / Epic Records Reviewed: Yes     ** Please Note: This note has been constructed using a voice recognition system   **

## 2021-05-07 VITALS
RESPIRATION RATE: 18 BRPM | OXYGEN SATURATION: 92 % | HEART RATE: 76 BPM | WEIGHT: 188 LBS | BODY MASS INDEX: 28.49 KG/M2 | DIASTOLIC BLOOD PRESSURE: 72 MMHG | SYSTOLIC BLOOD PRESSURE: 116 MMHG | HEIGHT: 68 IN | TEMPERATURE: 98.4 F

## 2021-05-07 LAB
ALBUMIN SERPL BCP-MCNC: 2.5 G/DL (ref 3.5–5)
ALP SERPL-CCNC: 223 U/L (ref 46–116)
ALT SERPL W P-5'-P-CCNC: 34 U/L (ref 12–78)
ANION GAP SERPL CALCULATED.3IONS-SCNC: 10 MMOL/L (ref 4–13)
AST SERPL W P-5'-P-CCNC: 36 U/L (ref 5–45)
BASOPHILS # BLD AUTO: 0.03 THOUSANDS/ΜL (ref 0–0.1)
BASOPHILS NFR BLD AUTO: 1 % (ref 0–1)
BILIRUB SERPL-MCNC: 0.4 MG/DL (ref 0.2–1)
BUN SERPL-MCNC: 10 MG/DL (ref 5–25)
CALCIUM ALBUM COR SERPL-MCNC: 9.9 MG/DL (ref 8.3–10.1)
CALCIUM SERPL-MCNC: 8.7 MG/DL (ref 8.3–10.1)
CHLORIDE SERPL-SCNC: 101 MMOL/L (ref 100–108)
CO2 SERPL-SCNC: 24 MMOL/L (ref 21–32)
CREAT SERPL-MCNC: 0.58 MG/DL (ref 0.6–1.3)
EOSINOPHIL # BLD AUTO: 0.05 THOUSAND/ΜL (ref 0–0.61)
EOSINOPHIL NFR BLD AUTO: 1 % (ref 0–6)
ERYTHROCYTE [DISTWIDTH] IN BLOOD BY AUTOMATED COUNT: 12.1 % (ref 11.6–15.1)
GFR SERPL CREATININE-BSD FRML MDRD: 96 ML/MIN/1.73SQ M
GLUCOSE SERPL-MCNC: 102 MG/DL (ref 65–140)
HCT VFR BLD AUTO: 37 % (ref 34.8–46.1)
HGB BLD-MCNC: 12.7 G/DL (ref 11.5–15.4)
IMM GRANULOCYTES # BLD AUTO: 0.03 THOUSAND/UL (ref 0–0.2)
IMM GRANULOCYTES NFR BLD AUTO: 1 % (ref 0–2)
LYMPHOCYTES # BLD AUTO: 1.36 THOUSANDS/ΜL (ref 0.6–4.47)
LYMPHOCYTES NFR BLD AUTO: 34 % (ref 14–44)
MCH RBC QN AUTO: 31 PG (ref 26.8–34.3)
MCHC RBC AUTO-ENTMCNC: 34.3 G/DL (ref 31.4–37.4)
MCV RBC AUTO: 90 FL (ref 82–98)
MONOCYTES # BLD AUTO: 0.39 THOUSAND/ΜL (ref 0.17–1.22)
MONOCYTES NFR BLD AUTO: 10 % (ref 4–12)
NEUTROPHILS # BLD AUTO: 2.12 THOUSANDS/ΜL (ref 1.85–7.62)
NEUTS SEG NFR BLD AUTO: 53 % (ref 43–75)
NRBC BLD AUTO-RTO: 0 /100 WBCS
PLATELET # BLD AUTO: 347 THOUSANDS/UL (ref 149–390)
PMV BLD AUTO: 9.9 FL (ref 8.9–12.7)
POTASSIUM SERPL-SCNC: 3.7 MMOL/L (ref 3.5–5.3)
PROCALCITONIN SERPL-MCNC: <0.05 NG/ML
PROT SERPL-MCNC: 6.9 G/DL (ref 6.4–8.2)
RBC # BLD AUTO: 4.1 MILLION/UL (ref 3.81–5.12)
SODIUM SERPL-SCNC: 135 MMOL/L (ref 136–145)
WBC # BLD AUTO: 3.98 THOUSAND/UL (ref 4.31–10.16)

## 2021-05-07 PROCEDURE — 85025 COMPLETE CBC W/AUTO DIFF WBC: CPT | Performed by: PHYSICIAN ASSISTANT

## 2021-05-07 PROCEDURE — 84145 PROCALCITONIN (PCT): CPT | Performed by: PHYSICIAN ASSISTANT

## 2021-05-07 PROCEDURE — 80053 COMPREHEN METABOLIC PANEL: CPT | Performed by: PHYSICIAN ASSISTANT

## 2021-05-07 RX ORDER — ZINC SULFATE 50(220)MG
220 CAPSULE ORAL DAILY
Qty: 5 CAPSULE | Refills: 0 | Status: SHIPPED | OUTPATIENT
Start: 2021-05-08 | End: 2021-05-13

## 2021-05-07 RX ORDER — BENZONATATE 200 MG/1
200 CAPSULE ORAL 3 TIMES DAILY PRN
Qty: 20 CAPSULE | Refills: 0 | Status: SHIPPED | OUTPATIENT
Start: 2021-05-07 | End: 2022-06-24

## 2021-05-07 RX ORDER — ONDANSETRON 4 MG/1
4 TABLET, ORALLY DISINTEGRATING ORAL EVERY 6 HOURS PRN
Qty: 20 TABLET | Refills: 0 | Status: SHIPPED | OUTPATIENT
Start: 2021-05-07 | End: 2022-06-24

## 2021-05-07 RX ORDER — FAMOTIDINE 20 MG/1
20 TABLET, FILM COATED ORAL 2 TIMES DAILY
Qty: 60 TABLET | Refills: 0 | Status: SHIPPED | OUTPATIENT
Start: 2021-05-07 | End: 2022-06-24

## 2021-05-07 RX ORDER — ONDANSETRON 2 MG/ML
4 INJECTION INTRAMUSCULAR; INTRAVENOUS EVERY 4 HOURS PRN
Status: DISCONTINUED | OUTPATIENT
Start: 2021-05-07 | End: 2021-05-07 | Stop reason: HOSPADM

## 2021-05-07 RX ORDER — MELATONIN
2000 DAILY
Qty: 30 TABLET | Refills: 0 | Status: SHIPPED | OUTPATIENT
Start: 2021-05-08 | End: 2022-06-24

## 2021-05-07 RX ADMIN — FAMOTIDINE 20 MG: 20 TABLET, FILM COATED ORAL at 10:22

## 2021-05-07 RX ADMIN — ENOXAPARIN SODIUM 30 MG: 100 INJECTION SUBCUTANEOUS at 05:12

## 2021-05-07 RX ADMIN — Medication 2000 UNITS: at 10:22

## 2021-05-07 RX ADMIN — LOSARTAN POTASSIUM 50 MG: 50 TABLET, FILM COATED ORAL at 10:22

## 2021-05-07 RX ADMIN — ACETAMINOPHEN 650 MG: 325 TABLET, FILM COATED ORAL at 10:37

## 2021-05-07 RX ADMIN — OXYCODONE HYDROCHLORIDE AND ACETAMINOPHEN 1000 MG: 500 TABLET ORAL at 10:22

## 2021-05-07 RX ADMIN — Medication 250 MG: at 10:22

## 2021-05-07 RX ADMIN — Medication 1 TABLET: at 10:22

## 2021-05-07 RX ADMIN — ANASTROZOLE 1 MG: 1 TABLET, COATED ORAL at 10:25

## 2021-05-07 RX ADMIN — ONDANSETRON 4 MG: 2 INJECTION INTRAMUSCULAR; INTRAVENOUS at 11:53

## 2021-05-07 RX ADMIN — DESVENLAFAXINE 50 MG: 50 TABLET, FILM COATED, EXTENDED RELEASE ORAL at 10:22

## 2021-05-07 RX ADMIN — ZINC SULFATE 220 MG (50 MG) CAPSULE 220 MG: CAPSULE at 10:22

## 2021-05-07 RX ADMIN — PANTOPRAZOLE SODIUM 40 MG: 40 TABLET, DELAYED RELEASE ORAL at 10:22

## 2021-05-07 NOTE — UTILIZATION REVIEW
Initial Clinical Review    Admission: Date/Time/Statement:   Admission Orders (From admission, onward)     Ordered        05/06/21 1240  Inpatient Admission  Once                   Orders Placed This Encounter   Procedures    Inpatient Admission     Standing Status:   Standing     Number of Occurrences:   1     Order Specific Question:   Level of Care     Answer:   Med Surg [16]     Order Specific Question:   Estimated length of stay     Answer:   More than 2 Midnights     Order Specific Question:   Certification     Answer:   I certify that inpatient services are medically necessary for this patient for a duration of greater than two midnights  See H&P and MD Progress Notes for additional information about the patient's course of treatment  ED Arrival Information     Expected Arrival Acuity Means of Arrival Escorted By Service Admission Type    - 5/6/2021 10:21 Urgent Walk-In Self General Medicine Urgent    Arrival Complaint    covid + shortness of breath was dropped off        Chief Complaint   Patient presents with    Shortness of Breath     Patient presents to the ER with increased shortness of breath  Patient was confirmed COVID positive on 4/28/21  Initial Presentation: This is a 76year old female from home to ED admitted inpatient due to  Pneumonia due to [place] 19  History of bilary cirrhosis  Diagnosed with COVID 19 4/28/2021  Presented due to worsening shortness of breath starting about 8 days prior to arrival, fever last da  Has myalgias, chest pain, headache, fatigue, weakness and cough   On exam tachypnea  Decreased breath sounds  CRP 48 6  D dimer 0 66  CxR showed pneumonia and atelectasis  Plan is mild COVID protocol:  Continue vitamin-D, vitamin-C and zinc; Remdesivir; continue Pepcid, bid Lovenox  No antibiotics unless elevated procalitonin  Date: 5/7/2021    Day 2: Patient feeling improved  ambulation sat to 91- 93%  On exam lungs clears   To continue mild COVID Protocol       ED Triage Vitals [05/06/21 1036]   Temperature Pulse Respirations Blood Pressure SpO2   99 2 °F (37 3 °C) 95 20 110/68 92 %      Temp Source Heart Rate Source Patient Position - Orthostatic VS BP Location FiO2 (%)   Oral Monitor Lying Right arm --      Pain Score       No Pain          Wt Readings from Last 1 Encounters:   05/06/21 85 3 kg (188 lb)     Additional Vital Signs:   05/07/21 09:20:24  98 4 °F (36 9 °C)  76  18  116/72  87  92 %  --  --   05/06/21 23:03:49  98 9 °F (37 2 °C)  77  --  117/72  87  91 %  --  --   05/06/21 23:03:18  98 9 °F (37 2 °C)  --  --  117/72  87  --  --  --   05/06/21 2030  --  86  18  121/66  88  92 %  --  --   05/06/21 1630  --  88  19  113/59  80  93 %  None (Room air)  Lying   05/06/21 1600  --  82  26Abnormal   109/59  79  94 %  None (Room air)  Lying   05/06/21 1230  --  82  24Abnormal   110/66  83  93 %  --         Pertinent Labs/Diagnostic Test Results:   5/6/2021 CxR Patchy and linear opacities, primarily in the left lower lobe and right upper lobe, probably a combination of pneumonia and subsegmental atelectasis    5/6/2021 ECG Normal sinus rhythm  Nonspecific ST abnormality  Abnormal ECG  When compared with ECG of 03-DEC-2020 11:00,  No significant change was found  Results from last 7 days   Lab Units 05/07/21  0515 05/06/21  1133   WBC Thousand/uL 3 98* 6 99   HEMOGLOBIN g/dL 12 7 13 4   HEMATOCRIT % 37 0 38 6   PLATELETS Thousands/uL 347 351   NEUTROS ABS Thousands/µL 2 12 4 69     Results from last 7 days   Lab Units 05/07/21  0515 05/06/21  1133   SODIUM mmol/L 135* 133*   POTASSIUM mmol/L 3 7 3 6   CHLORIDE mmol/L 101 97*   CO2 mmol/L 24 21   ANION GAP mmol/L 10 15*   BUN mg/dL 10 10   CREATININE mg/dL 0 58* 0 64   EGFR ml/min/1 73sq m 96 93   CALCIUM mg/dL 8 7 9 1   MAGNESIUM mg/dL  --  1 6     Results from last 7 days   Lab Units 05/07/21  0515 05/06/21  1133   AST U/L 36 32   ALT U/L 34 40   ALK PHOS U/L 223* 238*   TOTAL PROTEIN g/dL 6 9 8 0 ALBUMIN g/dL 2 5* 3 1*   TOTAL BILIRUBIN mg/dL 0 40 0 60     Results from last 7 days   Lab Units 05/07/21  0515 05/06/21  1133   GLUCOSE RANDOM mg/dL 102 110     Results from last 7 days   Lab Units 05/06/21  1133   CK TOTAL U/L 53     Results from last 7 days   Lab Units 05/06/21  1133   TROPONIN I ng/mL <0 02     Results from last 7 days   Lab Units 05/06/21  1133   D-DIMER QUANTITATIVE ug/ml FEU 0 66*     Results from last 7 days   Lab Units 05/06/21  1133   PROTIME seconds 12 6   INR  0 94         Results from last 7 days   Lab Units 05/07/21  0518 05/06/21  1133   PROCALCITONIN ng/ml <0 05 <0 05     Results from last 7 days   Lab Units 05/06/21  1133   LACTIC ACID mmol/L 1 1     Results from last 7 days   Lab Units 05/06/21  1133   NT-PRO BNP pg/mL 50     Results from last 7 days   Lab Units 05/06/21  1133   FERRITIN ng/mL 223     Results from last 7 days   Lab Units 05/06/21  1133   CRP mg/L 48 6*     Results from last 7 days   Lab Units 05/06/21  1217   CLARITY UA  Clear   COLOR UA  [place]   SPEC GRAV UA  1 020   PH UA  6 5   GLUCOSE UA mg/dl Negative   KETONES UA mg/dl Trace*   BLOOD UA  Negative   PROTEIN UA mg/dl 30 (1+)*   NITRITE UA  Negative   BILIRUBIN UA  Negative   UROBILINOGEN UA E U /dl 1 0   LEUKOCYTES UA  Trace*   WBC UA /hpf None Seen   RBC UA /hpf None Seen   BACTERIA UA /hpf None Seen   EPITHELIAL CELLS WET PREP /hpf None Seen     Results from last 7 days   Lab Units 05/06/21  1142 05/06/21  1133   BLOOD CULTURE  Received in Microbiology Lab  Culture in Progress  Received in Microbiology Lab  Culture in Progress         ED Treatment:   Medication Administration from 05/06/2021 1021 to 05/06/2021 2259       Date/Time Order Dose Route Action Comments     05/06/2021 1834 losartan (COZAAR) tablet 50 mg 50 mg Oral Given      05/06/2021 1535 calcium carbonate-vitamin D (OSCAL-D) 500 mg-200 units per tablet 1 tablet 1 tablet Oral Given      05/06/2021 1639 albuterol (PROVENTIL HFA,VENTOLIN HFA) inhaler 1 puff 1 puff Inhalation Given      05/06/2021 1640 cholecalciferol (VITAMIN D3) tablet 2,000 Units 2,000 Units Oral Given      05/06/2021 2048 ascorbic acid (VITAMIN C) tablet 1,000 mg 1,000 mg Oral Given      05/06/2021 1640 ascorbic acid (VITAMIN C) tablet 1,000 mg 1,000 mg Oral Given      05/06/2021 1640 zinc sulfate (ZINCATE) capsule 220 mg 220 mg Oral Given      05/06/2021 1834 famotidine (PEPCID) tablet 20 mg 20 mg Oral Given      05/06/2021 1535 remdesivir (Veklury) 200 mg in sodium chloride 0 9 % 250 mL IVPB 200 mg Intravenous New Bag      05/06/2021 1639 enoxaparin (LOVENOX) subcutaneous injection 30 mg 30 mg Subcutaneous Given      05/06/2021 2049 acetaminophen (TYLENOL) tablet 650 mg 650 mg Oral Given         Past Medical History:   Diagnosis Date    Anxiety     Morales esophagus     Gastroesophageal reflux disease without esophagitis 5/14/2019    GERD (gastroesophageal reflux disease)     History of gallstones     Hypertension     Personal history of colonic polyps 5/14/2019    [facility] biliary cirrhosis ([facility] ) 5/14/2019    Seasonal allergies      Present on Admission:   Morales's esophagus without dysplasia   Primary biliary cirrhosis ([facility] )      Admitting Diagnosis: SOB (shortness of breath) [R06 02]  Pneumonia due to COVID-19 virus [U07 1, J12 82]  Age/Sex: 76 y o  female  Admission Orders:  5/6/2021 1240 inpatient   Scheduled Medications:  anastrozole, 1 mg, Oral, Daily  ascorbic acid, 1,000 mg, Oral, Q12H [address]  calcium carbonate-vitamin D, 1 tablet, Oral, Daily  cholecalciferol, 2,000 Units, Oral, Daily  desvenlafaxine succinate, 50 mg, Oral, Daily  enoxaparin, 30 mg, Subcutaneous, Q12H NIKOLAI  famotidine, 20 mg, Oral, BID  losartan, 50 mg, Oral, Daily  zinc sulfate, 220 mg, Oral, Daily    Followed by  Ben Renteria ON 5/13/2021] multivitamin-minerals, 1 tablet, Oral, Daily  pantoprazole, 40 mg, Oral, Daily  remdesivir, 100 mg, Intravenous, Q24H  saccharomyces boulardii, 250 mg, Oral, BID  zolpidem, 10 mg, Oral, HS      Continuous IV Infusions: none     PRN Meds:  acetaminophen, 650 mg, Oral, Q6H PRN - used x 2   albuterol, 1 puff, Inhalation, Q4H PRN - used x 1 5/6  loratadine-pseudoephedrine, 1 tablet, Oral, Daily PRN  ondansetron, 4 mg, Intravenous, Q4H PRN - used x 1 5/7 (1153)    Contact and airborne isolation   Oxygen to keep sat > 90%      Network Utilization Review Department  ATTENTION: Please call with any questions or concerns to [phone] and carefully listen to the prompts so that you are directed to the right person  All voicemails are confidential   Ritesh Holley all requests for admission clinical reviews, approved or denied determinations and any other requests to dedicated fax number below belonging to the campus where the patient is receiving treatment   List of dedicated fax numbers for the Facilities:  [address] DENIALS (Administrative/Medical Necessity) [phone]   [address] (Maternity/NICU/Pediatrics) [phone]   [address] [place] [address] [address] [address] [place] [address] [address] [facility] [place] [address] [phone]

## 2021-05-07 NOTE — DISCHARGE INSTRUCTIONS

## 2021-05-07 NOTE — ASSESSMENT & PLAN NOTE
· Continue Ursodiol, we do not carry patient's home dose  · Patient will bring in from home while admitted  · Resume home dose on discharge

## 2021-05-07 NOTE — ASSESSMENT & PLAN NOTE
· First diagnosed 4/28  · Patient saturating 95% on room air during admission, provide supplemental oxygen if needed  · Has remained stable off of oxygen for 24 hours  While ambulating oxygen saturations ranged from 91-93%  Stable for discharge home without supplemental oxygen  · Continue with incentive spirometry  · Patient reports worsening shortness of breath over the past few days  · Admitted under mild pathway  · Continue vitamin-D, vitamin-C and zinc  · Continue remdesivir while admitted, patient has a history of malignancy  · Continue Pepcid  · Procalcitonin negative  · CRP:  40 6   · Ferritin:  223  · D-dimer:  0 66  · Continue b i d   Lovenox  · ProBNP:  40  · CK:  53  · Troponin: <0 02

## 2021-05-07 NOTE — DISCHARGE SUMMARY
[place]  Discharge- Valente Owusu 1955, 76 y o  female MRN: [id number]  Unit/Bed#: -01 Encounter: [id number]  Primary Care Provider: Buck Rogel DO   Date and time admitted to hospital: 5/6/2021 10:31 AM    * COVID-19  Assessment & Plan  · First diagnosed 4/28  · Patient saturating 95% on room air during admission, provide supplemental oxygen if needed  · Has remained stable off of oxygen for 24 hours  While ambulating oxygen saturations ranged from 91-93%  Stable for discharge home without supplemental oxygen  · Continue with incentive spirometry  · Patient reports worsening shortness of breath over the past few days  · Admitted under mild pathway  · Continue vitamin-D, vitamin-C and zinc  · Continue remdesivir while admitted, patient has a history of malignancy  · Continue Pepcid  · Procalcitonin negative  · CRP:  40 6   · Ferritin:  223  · D-dimer:  0 66  · Continue b i d   Lovenox  · ProBNP:  40  · CK:  53  · Troponin: <0 02    Malignant neoplasm of upper-outer quadrant of right breast in female, estrogen receptor positive ([facility] )  Assessment & Plan  · Status post lumpectomy 12/22/2020  · Continue anastrozole  · Will treat COVID-19 with remdesivir even the patient is not currently requiring supplemental oxygen    Morales's esophagus without dysplasia  Assessment & Plan  · Continue acid suppressant therapy    Primary biliary cirrhosis ([facility] )  Assessment & Plan  · Continue Ursodiol, we do not carry patient's home dose  · Patient will bring in from home while admitted  · Resume home dose on discharge    Discharging Physician / Practitioner: Rachel Scherer PA-C  PCP: Buck Rogel DO  Admission Date:   Admission Orders (From admission, onward)     Ordered        05/06/21 1240  Inpatient Admission  Once                   Discharge Date: 05/07/21    Resolved Problems  Date Reviewed: 5/7/2021    None          Consultations During Hospital Stay:  · None    Procedures Performed:   · None    Significant Findings / Test Results:   · None    Incidental Findings:   · None     Test Results Pending at Discharge (will require follow up): · None     Outpatient Tests Requested:  · Follow up imaging chest in 3 months to ensure resolution of COVID pneumonia    Complications:  None    Reason for Admission: SOB associated with [address] Course:     Iram Temple is a 76 y o  female patient past medical history of GERD, hypertension, primary biliary cirrhosis, breast cancer status post lumpectomy who originally presented to the hospital on 5/6/2021 due to feeling worsening shortness of breath associated with her COVID-19 pneumonia  She was diagnosed in 4/28  Patient worsening symptoms of fever, myalgias, chest pain, headaches, fatigue and weakness as well as cough  Patient saturating on low to mid 90s on room air in emergency department  She did receive 1 dose of remdesivir while admitted  Saturations remained in the mid 90s at rest and in low 90s with ambulation  She did not require oxygen  Hemodynamically stable for return home  Encourage patient to return to hospital shortness of breath worsens and pulse ox drops  No indication to start steroids or antibiotics as patient did not require supplemental oxygen while admitted  The patient, initially admitted to the hospital as inpatient, was discharged earlier than expected given the following: stable for return home on RA  Please see above list of diagnoses and related plan for additional information  Condition at Discharge: stable     Discharge Day Visit / Exam:     Subjective:  Patient reports he feels well, is agreeable to discharge home oral COVID medications    Vitals: Blood Pressure: 116/72 (05/07/21 0920)  Pulse: 76 (05/07/21 0920)  Temperature: 98 4 °F (36 9 °C) (05/07/21 0920)  Temp Source: Oral (05/06/21 1036)  Respirations: 18 (05/07/21 0920)  Height: 5' 7 5" (171 5 cm) (05/06/21 1036)  Weight - Scale: 85 3 kg (188 lb) (05/06/21 1036)  SpO2: 92 % (05/07/21 0920)  Exam:   Physical Exam  Vitals signs and nursing note reviewed  Constitutional:       General: She is not in acute distress  Appearance: Normal appearance  She is well-developed  HENT:      Head: Normocephalic and atraumatic  Eyes:      General: No scleral icterus  Conjunctiva/sclera: Conjunctivae normal    Cardiovascular:      Rate and Rhythm: Normal rate and regular rhythm  Heart sounds: No murmur  Pulmonary:      Effort: Pulmonary effort is normal       Breath sounds: No wheezing, rhonchi or rales  Abdominal:      General: There is no distension  Palpations: Abdomen is soft  Skin:     General: Skin is warm and dry  Neurological:      General: No focal deficit present  Mental Status: She is alert  Psychiatric:         Mood and Affect: Mood normal        Discussion with Family: Patient has updated her     Discharge instructions/Information to patient and family:   See after visit summary for information provided to patient and family  Provisions for Follow-Up Care:  See after visit summary for information related to follow-up care and any pertinent home health orders  Disposition:     Home    For Discharges to [facility] SNF:   · Not Applicable to this Patient - Not Applicable to this Patient    Planned Readmission: none     Discharge Statement:  I spent 65 minutes discharging the patient  This time was spent on the day of discharge  I had direct contact with the patient on the day of discharge  Greater than 50% of the total time was spent examining patient, answering all patient questions, arranging and discussing plan of care with patient as well as directly providing post-discharge instructions  Additional time then spent on discharge activities  Discharge Medications:  See after visit summary for reconciled discharge medications provided to patient and family        ** Please Note: This note has been constructed using a voice recognition system **

## 2021-05-07 NOTE — PLAN OF CARE
Problem: Potential for Falls  Goal: Patient will remain free of falls  Description: INTERVENTIONS:  - Assess patient frequently for physical needs  -  Identify cognitive and physical deficits and behaviors that affect risk of falls    -  [place] fall precautions as indicated by assessment   - Educate patient/family on patient safety including physical limitations  - Instruct patient to call for assistance with activity based on assessment  - Modify environment to reduce risk of injury  - Consider OT/PT consult to assist with strengthening/mobility  Outcome: Progressing     Problem: PAIN - ADULT  Goal: Verbalizes/displays adequate comfort level or baseline comfort level  Description: Interventions:  - Encourage patient to monitor pain and request assistance  - Assess pain using appropriate pain scale  - Administer analgesics based on type and severity of pain and evaluate response  - Implement non-pharmacological measures as appropriate and evaluate response  - Consider cultural and social influences on pain and pain management  - Notify physician/advanced practitioner if interventions unsuccessful or patient reports new pain  Outcome: Progressing     Problem: INFECTION - ADULT  Goal: Absence or prevention of progression during hospitalization  Description: INTERVENTIONS:  - Assess and monitor for signs and symptoms of infection  - Monitor lab/diagnostic results  - Monitor all insertion sites, i e  indwelling lines, tubes, and drains  - Monitor endotracheal if appropriate and nasal secretions for changes in amount and color  - [place] appropriate cooling/warming therapies per order  - Administer medications as ordered  - Instruct and encourage patient and family to use good hand hygiene technique  - Identify and instruct in appropriate isolation precautions for identified infection/condition  Outcome: Progressing     Problem: SAFETY ADULT  Goal: Patient will remain free of falls  Description: INTERVENTIONS:  - Assess patient frequently for physical needs  -  Identify cognitive and physical deficits and behaviors that affect risk of falls    -  [place] fall precautions as indicated by assessment   - Educate patient/family on patient safety including physical limitations  - Instruct patient to call for assistance with activity based on assessment  - Modify environment to reduce risk of injury  - Consider OT/PT consult to assist with strengthening/mobility  Outcome: Progressing  Goal: Maintain or return to baseline ADL function  Description: INTERVENTIONS:  -  Assess patient's ability to carry out ADLs; assess patient's baseline for ADL function and identify physical deficits which impact ability to perform ADLs (bathing, care of mouth/teeth, toileting, grooming, dressing, etc )  - Assess/evaluate cause of self-care deficits   - Assess range of motion  - Assess patient's mobility; develop plan if impaired  - Assess patient's need for assistive devices and provide as appropriate  - Encourage maximum independence but intervene and supervise when necessary  - Involve family in performance of ADLs  - Assess for home care needs following discharge   - Consider OT consult to assist with ADL evaluation and planning for discharge  - Provide patient education as appropriate  Outcome: Progressing  Goal: Maintain or return mobility status to optimal level  Description: INTERVENTIONS:  - Assess patient's baseline mobility status (ambulation, transfers, stairs, etc )    - Identify cognitive and physical deficits and behaviors that affect mobility  - Identify mobility aids required to assist with transfers and/or ambulation (gait belt, sit-to-stand, lift, walker, cane, etc )  - [place] fall precautions as indicated by assessment  - Record patient progress and toleration of activity level on Mobility SBAR; progress patient to next Phase/Stage  - Instruct patient to call for assistance with activity based on assessment  - Consider rehabilitation consult to assist with strengthening/weightbearing, etc   Outcome: Progressing     Problem: DISCHARGE PLANNING  Goal: Discharge to home or other facility with appropriate resources  Description: INTERVENTIONS:  - Identify barriers to discharge w/patient and caregiver  - Arrange for needed discharge resources and transportation as appropriate  - Identify discharge learning needs (meds, wound care, etc )  - Arrange for interpretive services to assist at discharge as needed  - Refer to Case Management Department for coordinating discharge planning if the patient needs post-hospital services based on physician/advanced practitioner order or complex needs related to functional status, cognitive ability, or social support system  Outcome: Progressing     Problem: Knowledge Deficit  Goal: Patient/family/caregiver demonstrates understanding of disease process, treatment plan, medications, and discharge instructions  Description: Complete learning assessment and assess knowledge base    Interventions:  - Provide teaching at level of understanding  - Provide teaching via preferred learning methods  Outcome: Progressing     Problem: RESPIRATORY - ADULT  Goal: Achieves optimal ventilation and oxygenation  Description: INTERVENTIONS:  - Assess for changes in respiratory status  - Assess for changes in mentation and behavior  - Position to facilitate oxygenation and minimize respiratory effort  - Oxygen administered by appropriate delivery if ordered  - Initiate smoking cessation education as indicated  - Encourage broncho-pulmonary hygiene including cough, deep breathe, Incentive Spirometry  - Assess the need for suctioning and aspirate as needed  - Assess and instruct to report SOB or any respiratory difficulty  - Respiratory Therapy support as indicated  Outcome: Progressing

## 2021-05-07 NOTE — DISCHARGE INSTR - AVS FIRST PAGE
Dear Ehsan Boss,     It was our pleasure to care for you here at [facility], [address] [place] [address]  It is our hope that we were always able to exceed the expected standards for your care during your stay  You were hospitalized due to COVID-19  You were cared for on the 3rd floor by Skyler Garcia PA-C under the service of Karin Wolff MD with the [facility] who covers for your primary care physician (PCP), Antwon Garner DO, while you were hospitalized  If you have any questions or concerns related to this hospitalization, you may contact us at [phone]  For follow up as well as any medication refills, we recommend that you follow up with your primary care physician  A registered nurse will reach out to you by phone within a few days after your discharge to answer any additional questions that you may have after going home  However, at this time we provide for you here, the most important instructions / recommendations at discharge:     · Notable Medication Adjustments -   · Start COVID vitamin cocktail (Vitamin C, D and Zinc)  · Start Pepcid  · Zofran as needed for nausea and vomiting  · Testing Required after Discharge -   · Repeat imaging chest in 3 months to ensure resolution  · Important follow up information -   ·  Follow up with your PCP within one week on telehealth visit  · Other Instructions -   · Return to hospital if SOB worsens  · Please review this entire after visit summary as additional general instructions including medication list, appointments, activity, diet, any pertinent wound care, and other additional recommendations from your care team that may be provided for you        Sincerely,     Skyler Garcia PA-C

## 2021-05-07 NOTE — PLAN OF CARE
Problem: Potential for Falls  Goal: Patient will remain free of falls  Description: INTERVENTIONS:  - Assess patient frequently for physical needs  -  Identify cognitive and physical deficits and behaviors that affect risk of falls    -  [place] fall precautions as indicated by assessment   - Educate patient/family on patient safety including physical limitations  - Instruct patient to call for assistance with activity based on assessment  - Modify environment to reduce risk of injury  - Consider OT/PT consult to assist with strengthening/mobility  Outcome: Progressing     Problem: PAIN - ADULT  Goal: Verbalizes/displays adequate comfort level or baseline comfort level  Description: Interventions:  - Encourage patient to monitor pain and request assistance  - Assess pain using appropriate pain scale  - Administer analgesics based on type and severity of pain and evaluate response  - Implement non-pharmacological measures as appropriate and evaluate response  - Consider cultural and social influences on pain and pain management  - Notify physician/advanced practitioner if interventions unsuccessful or patient reports new pain  Outcome: Progressing     Problem: INFECTION - ADULT  Goal: Absence or prevention of progression during hospitalization  Description: INTERVENTIONS:  - Assess and monitor for signs and symptoms of infection  - Monitor lab/diagnostic results  - Monitor all insertion sites, i e  indwelling lines, tubes, and drains  - Monitor endotracheal if appropriate and nasal secretions for changes in amount and color  - [place] appropriate cooling/warming therapies per order  - Administer medications as ordered  - Instruct and encourage patient and family to use good hand hygiene technique  - Identify and instruct in appropriate isolation precautions for identified infection/condition  Outcome: Progressing     Problem: SAFETY ADULT  Goal: Patient will remain free of falls  Description: INTERVENTIONS:  - Assess patient frequently for physical needs  -  Identify cognitive and physical deficits and behaviors that affect risk of falls    -  [place] fall precautions as indicated by assessment   - Educate patient/family on patient safety including physical limitations  - Instruct patient to call for assistance with activity based on assessment  - Modify environment to reduce risk of injury  - Consider OT/PT consult to assist with strengthening/mobility  Outcome: Progressing  Goal: Maintain or return to baseline ADL function  Description: INTERVENTIONS:  -  Assess patient's ability to carry out ADLs; assess patient's baseline for ADL function and identify physical deficits which impact ability to perform ADLs (bathing, care of mouth/teeth, toileting, grooming, dressing, etc )  - Assess/evaluate cause of self-care deficits   - Assess range of motion  - Assess patient's mobility; develop plan if impaired  - Assess patient's need for assistive devices and provide as appropriate  - Encourage maximum independence but intervene and supervise when necessary  - Involve family in performance of ADLs  - Assess for home care needs following discharge   - Consider OT consult to assist with ADL evaluation and planning for discharge  - Provide patient education as appropriate  Outcome: Progressing  Goal: Maintain or return mobility status to optimal level  Description: INTERVENTIONS:  - Assess patient's baseline mobility status (ambulation, transfers, stairs, etc )    - Identify cognitive and physical deficits and behaviors that affect mobility  - Identify mobility aids required to assist with transfers and/or ambulation (gait belt, sit-to-stand, lift, walker, cane, etc )  - [place] fall precautions as indicated by assessment  - Record patient progress and toleration of activity level on Mobility SBAR; progress patient to next Phase/Stage  - Instruct patient to call for assistance with activity based on assessment  - Consider rehabilitation consult to assist with strengthening/weightbearing, etc   Outcome: Progressing     Problem: DISCHARGE PLANNING  Goal: Discharge to home or other facility with appropriate resources  Description: INTERVENTIONS:  - Identify barriers to discharge w/patient and caregiver  - Arrange for needed discharge resources and transportation as appropriate  - Identify discharge learning needs (meds, wound care, etc )  - Arrange for interpretive services to assist at discharge as needed  - Refer to Case Management Department for coordinating discharge planning if the patient needs post-hospital services based on physician/advanced practitioner order or complex needs related to functional status, cognitive ability, or social support system  Outcome: Progressing     Problem: Knowledge Deficit  Goal: Patient/family/caregiver demonstrates understanding of disease process, treatment plan, medications, and discharge instructions  Description: Complete learning assessment and assess knowledge base    Interventions:  - Provide teaching at level of understanding  - Provide teaching via preferred learning methods  Outcome: Progressing     Problem: RESPIRATORY - ADULT  Goal: Achieves optimal ventilation and oxygenation  Description: INTERVENTIONS:  - Assess for changes in respiratory status  - Assess for changes in mentation and behavior  - Position to facilitate oxygenation and minimize respiratory effort  - Oxygen administered by appropriate delivery if ordered  - Initiate smoking cessation education as indicated  - Encourage broncho-pulmonary hygiene including cough, deep breathe, Incentive Spirometry  - Assess the need for suctioning and aspirate as needed  - Assess and instruct to report SOB or any respiratory difficulty  - Respiratory Therapy support as indicated  Outcome: Progressing

## 2021-05-11 LAB
BACTERIA BLD CULT: NORMAL
BACTERIA BLD CULT: NORMAL

## 2021-05-25 ENCOUNTER — HOSPITAL ENCOUNTER (OUTPATIENT)
Dept: RADIOLOGY | Facility: HOSPITAL | Age: 66
Discharge: HOME | End: 2021-05-25
Payer: COMMERCIAL

## 2021-05-25 ENCOUNTER — LAB (OUTPATIENT)
Dept: LAB | Facility: HOSPITAL | Age: 66
End: 2021-05-25
Payer: COMMERCIAL

## 2021-05-25 ENCOUNTER — TRANSCRIBE ORDERS (OUTPATIENT)
Dept: ADMINISTRATIVE | Facility: HOSPITAL | Age: 66
End: 2021-05-25

## 2021-05-25 DIAGNOSIS — E78.5 HYPERLIPIDEMIA, UNSPECIFIED HYPERLIPIDEMIA TYPE: Primary | ICD-10-CM

## 2021-05-25 DIAGNOSIS — J18.9 UNRESOLVED PNEUMONIA: ICD-10-CM

## 2021-05-25 DIAGNOSIS — E78.5 HYPERLIPIDEMIA, UNSPECIFIED HYPERLIPIDEMIA TYPE: ICD-10-CM

## 2021-05-25 LAB
ALBUMIN SERPL BCP-MCNC: 3.8 G/DL (ref 3.5–5)
ALP SERPL-CCNC: 144 U/L (ref 46–116)
ALT SERPL W P-5'-P-CCNC: 29 U/L (ref 12–78)
ANION GAP SERPL CALCULATED.3IONS-SCNC: 4 MMOL/L (ref 4–13)
AST SERPL W P-5'-P-CCNC: 15 U/L (ref 5–45)
BILIRUB SERPL-MCNC: 0.72 MG/DL (ref 0.2–1)
BUN SERPL-MCNC: 10 MG/DL (ref 5–25)
CALCIUM SERPL-MCNC: 9.8 MG/DL (ref 8.3–10.1)
CHLORIDE SERPL-SCNC: 100 MMOL/L (ref 100–108)
CHOLEST SERPL-MCNC: 184 MG/DL (ref 50–200)
CO2 SERPL-SCNC: 29 MMOL/L (ref 21–32)
CREAT SERPL-MCNC: 0.58 MG/DL (ref 0.6–1.3)
GFR SERPL CREATININE-BSD FRML MDRD: 96 ML/MIN/1.73SQ M
GLUCOSE P FAST SERPL-MCNC: 97 MG/DL (ref 65–99)
HDLC SERPL-MCNC: 74 MG/DL
LDLC SERPL CALC-MCNC: 91 MG/DL (ref 0–100)
NONHDLC SERPL-MCNC: 110 MG/DL
POTASSIUM SERPL-SCNC: 4 MMOL/L (ref 3.5–5.3)
PROT SERPL-MCNC: 7.9 G/DL (ref 6.4–8.2)
SODIUM SERPL-SCNC: 133 MMOL/L (ref 136–145)
TRIGL SERPL-MCNC: 96 MG/DL

## 2021-05-25 PROCEDURE — 80053 COMPREHEN METABOLIC PANEL: CPT

## 2021-05-25 PROCEDURE — 80061 LIPID PANEL: CPT

## 2021-05-25 PROCEDURE — 71046 X-RAY EXAM CHEST 2 VIEWS: CPT

## 2021-05-25 PROCEDURE — 36415 COLL VENOUS BLD VENIPUNCTURE: CPT

## 2021-06-02 DIAGNOSIS — C50.411 MALIGNANT NEOPLASM OF UPPER-OUTER QUADRANT OF RIGHT BREAST IN FEMALE, ESTROGEN RECEPTOR POSITIVE (HCC): ICD-10-CM

## 2021-06-02 DIAGNOSIS — Z17.0 MALIGNANT NEOPLASM OF UPPER-OUTER QUADRANT OF RIGHT BREAST IN FEMALE, ESTROGEN RECEPTOR POSITIVE (HCC): ICD-10-CM

## 2021-06-02 RX ORDER — ANASTROZOLE 1 MG/1
TABLET ORAL
Qty: 90 TABLET | Refills: 1 | Status: SHIPPED | OUTPATIENT
Start: 2021-06-02 | End: 2021-12-29

## 2021-08-10 ENCOUNTER — OFFICE VISIT (OUTPATIENT)
Dept: HEMATOLOGY ONCOLOGY | Facility: CLINIC | Age: 66
End: 2021-08-10
Payer: COMMERCIAL

## 2021-08-10 VITALS
DIASTOLIC BLOOD PRESSURE: 88 MMHG | SYSTOLIC BLOOD PRESSURE: 138 MMHG | TEMPERATURE: 96.8 F | HEIGHT: 67 IN | BODY MASS INDEX: 30.29 KG/M2 | RESPIRATION RATE: 18 BRPM | HEART RATE: 103 BPM | WEIGHT: 193 LBS | OXYGEN SATURATION: 96 %

## 2021-08-10 DIAGNOSIS — Z17.0 MALIGNANT NEOPLASM OF UPPER-OUTER QUADRANT OF RIGHT BREAST IN FEMALE, ESTROGEN RECEPTOR POSITIVE (HCC): Primary | ICD-10-CM

## 2021-08-10 DIAGNOSIS — C50.411 MALIGNANT NEOPLASM OF UPPER-OUTER QUADRANT OF RIGHT BREAST IN FEMALE, ESTROGEN RECEPTOR POSITIVE (HCC): Primary | ICD-10-CM

## 2021-08-10 PROCEDURE — 99214 OFFICE O/P EST MOD 30 MIN: CPT | Performed by: INTERNAL MEDICINE

## 2021-08-10 NOTE — PROGRESS NOTES
Hematology / Oncology Outpatient Follow Up Note    Leon Jay 78 y o  female VCY:[birth year] A:[id number]         Date:  8/10/2021    Assessment / Plan:  A 50-year-old postmenopausal woman with stage I A right breast cancer, grade 1 with mucinous histology, % positive, VT 25% positive, HER2 negative disease  She underwent lumpectomy and sentinel lymph node biopsy, resulting in TAMARA  Since January 2021, she has been on adjuvant hormonal therapy with anastrozole with excellent tolerance  Clinically, she has no evidence recurrent disease  I recommended her to continue with anastrozole 1 mg once a day  She is in agreement with my recommendation  I will see her again in 1 year for routine follow-up           Subjective:      HPI:  A 76years old postmenopausal woman who was found to have radiographic abnormality in her right breast for which she underwent biopsy in September 24, 2020  She had invasive mucinous carcinoma, grade 1  This was % positive, VT 25% positive, HER2 negative disease  Subsequently, she underwent lumpectomy and sentinel lymph node biopsy by Dr Ignacio Yancey in December 22, 2020 which showed 2 mm and 1 1 mm of mucinous carcinoma, grade 1  There was no evidence of lymphovascular invasion  1 sentinel lymph node was negative for metastatic disease  She presents today with her  to discuss adjuvant treatment options  She feels well with no pain  She has no respiratory symptoms  Her weight is stable  She has hypertension as well as primary biliary cirrhosis for which she is on ursodil  Her liver function test is normal   She has no family history of breast cancer  She is a lifetime never smoker  Her performance status is normal         Interval History:   A 78year-old postmenopausal woman with stage I A right breast cancer, grade 1 with mucinous histology, % positive, VT 25% positive, HER2 negative disease    She underwent lumpectomy and sentinel lymph node biopsy, resulting in TAMARA  Since February 2021, she has been on adjuvant hormonal therapy with anastrozole  She initially had some musculoskeletal symptom which has been subsiding  Otherwise, she is tolerating anastrozole well  She denied any pain  She has no respiratory symptoms  Her weight is stable  She has normal performance status            Objective:      Primary Diagnosis:     Right breast cancer, stage I A (pT1a, pN0, M0) grade 1, mucinous histology, % positive, SD 25% positive, HER2 negative disease  Diagnosed in December 2020       Cancer Staging:  Cancer Staging  No matching staging information was found for the patient         Previous Hematologic/ Oncologic Treatment:            Current Hematologic/ Oncologic Treatment:       Adjuvant hormonal therapy with anastrozole since January 2021       Disease Status:      TAMARA status post lumpectomy and sentinel lymph node biopsy      Test Results:     Pathology:     2 mm and 1 1 mm of invasive mucinous carcinoma, grade 1  No evidence of lymphovascular invasion  1 sentinel lymph node was negative for metastatic disease  % positive, SD 25% positive, HER2 negative disease  Stage I A (pT1a, pN0, M0)     Radiology:     Chest x-ray was negative for pulmonary disease      Laboratory:     See below      Physical Exam:        General Appearance:    Alert, oriented          Eyes:    PERRL   Ears:    Normal external ear canals, both ears   Nose:   Nares normal, septum midline   Throat:   Mucosa moist  Pharynx without injection  Neck:   Supple         Lungs:     Clear to auscultation bilaterally   Chest Wall:    No tenderness or deformity    Heart:    Regular rate and rhythm         Abdomen:     Soft, non-tender, bowel sounds +, no organomegaly               Extremities:   Extremities no cyanosis or edema         Skin:   no rash or icterus      Lymph nodes:   Cervical, supraclavicular, and axillary nodes normal   Neurologic:   CNII-XII intact, normal strength, sensation and reflexes     Throughout             Breast exam:   Lumpectomy scar at outer upper quadrant of her right breast with no palpable abnormality  Left breast exam is negative                ROS: Review of Systems   All other systems reviewed and are negative  Imaging: No results found  Labs:   Lab Results   Component Value Date    WBC 3 98 (L) 05/07/2021    HGB 12 7 05/07/2021    HCT 37 0 05/07/2021    MCV 90 05/07/2021     05/07/2021     Lab Results   Component Value Date    K 4 0 05/25/2021     05/25/2021    CO2 29 05/25/2021    BUN 10 05/25/2021    CREATININE 0 58 (L) 05/25/2021    GLUF 97 05/25/2021    CALCIUM 9 8 05/25/2021    CORRECTEDCA 9 9 05/07/2021    AST 15 05/25/2021    ALT 29 05/25/2021    ALKPHOS 144 (H) 05/25/2021    EGFR 96 05/25/2021         Lab Results   Component Value Date    FERRITIN 223 05/06/2021         Current Medications: Reviewed  Allergies: Reviewed  PMH/FH/SH:  Reviewed      Vital Sign:    Body surface area is 1 99 meters squared      Wt Readings from Last 3 Encounters:   08/10/21 87 5 kg (193 lb)   05/06/21 85 3 kg (188 lb)   04/19/21 87 8 kg (193 lb 8 oz)        Temp Readings from Last 3 Encounters:   08/10/21 (!) 96 8 °F (36 °C) (Tympanic Core)   05/07/21 98 4 °F (36 9 °C)   04/19/21 (!) 97 1 °F (36 2 °C) (Tympanic)        BP Readings from Last 3 Encounters:   08/10/21 138/88   05/07/21 116/72   04/19/21 122/80         Pulse Readings from Last 3 Encounters:   08/10/21 103   05/07/21 76   04/19/21 92     @LASTSAO2(3)@

## 2021-08-17 ENCOUNTER — HOSPITAL ENCOUNTER (OUTPATIENT)
Dept: MAMMOGRAPHY | Facility: CLINIC | Age: 66
Discharge: HOME | End: 2021-08-17
Payer: COMMERCIAL

## 2021-08-17 VITALS — BODY MASS INDEX: 30.29 KG/M2 | HEIGHT: 67 IN | WEIGHT: 193 LBS

## 2021-08-17 DIAGNOSIS — C50.411 MALIGNANT NEOPLASM OF UPPER-OUTER QUADRANT OF RIGHT BREAST IN FEMALE, ESTROGEN RECEPTOR POSITIVE (HCC): ICD-10-CM

## 2021-08-17 DIAGNOSIS — Z17.0 MALIGNANT NEOPLASM OF UPPER-OUTER QUADRANT OF RIGHT BREAST IN FEMALE, ESTROGEN RECEPTOR POSITIVE (HCC): ICD-10-CM

## 2021-08-17 PROCEDURE — G0279 TOMOSYNTHESIS, MAMMO: HCPCS

## 2021-08-17 PROCEDURE — 77066 DX MAMMO INCL CAD BI: CPT

## 2021-08-27 DIAGNOSIS — K22.70 BARRETT'S ESOPHAGUS WITHOUT DYSPLASIA: ICD-10-CM

## 2021-09-02 RX ORDER — PANTOPRAZOLE SODIUM 40 MG/1
TABLET, DELAYED RELEASE ORAL
Qty: 90 TABLET | Refills: 3 | Status: SHIPPED | OUTPATIENT
Start: 2021-09-02 | End: 2021-09-10

## 2021-09-02 NOTE — TELEPHONE ENCOUNTER
Pt left  mssg stating she needs Pantoprazole refill sent to [facility] in [place]; has appt 9/10 w/   CB# [phone]

## 2021-09-08 ENCOUNTER — TELEPHONE (OUTPATIENT)
Dept: GASTROENTEROLOGY | Facility: CLINIC | Age: 66
End: 2021-09-08

## 2021-09-08 DIAGNOSIS — K74.3 PRIMARY BILIARY CIRRHOSIS (HCC): Primary | ICD-10-CM

## 2021-09-08 NOTE — TELEPHONE ENCOUNTER
At patient's last ov 8/2020 labwork was requested every 6 months  She did have labs 12/20 and 5/21  Did you want any labs repeated before her ov Friday or is 5/21 recent enough?

## 2021-09-08 NOTE — TELEPHONE ENCOUNTER
Pt has annual appt Fri w/ [facility]; feels she should have labs done first   # [phone]

## 2021-09-09 ENCOUNTER — LAB (OUTPATIENT)
Dept: LAB | Facility: HOSPITAL | Age: 66
End: 2021-09-09
Attending: INTERNAL MEDICINE
Payer: COMMERCIAL

## 2021-09-09 DIAGNOSIS — K74.3 PRIMARY BILIARY CIRRHOSIS (HCC): ICD-10-CM

## 2021-09-09 LAB
ALBUMIN SERPL BCP-MCNC: 3.6 G/DL (ref 3.5–5)
ALP SERPL-CCNC: 115 U/L (ref 46–116)
ALT SERPL W P-5'-P-CCNC: 28 U/L (ref 12–78)
ANION GAP SERPL CALCULATED.3IONS-SCNC: 6 MMOL/L (ref 4–13)
AST SERPL W P-5'-P-CCNC: 14 U/L (ref 5–45)
BASOPHILS # BLD AUTO: 0.05 THOUSANDS/ΜL (ref 0–0.1)
BASOPHILS NFR BLD AUTO: 1 % (ref 0–1)
BILIRUB SERPL-MCNC: 0.86 MG/DL (ref 0.2–1)
BUN SERPL-MCNC: 17 MG/DL (ref 5–25)
CALCIUM SERPL-MCNC: 9.3 MG/DL (ref 8.3–10.1)
CHLORIDE SERPL-SCNC: 102 MMOL/L (ref 100–108)
CO2 SERPL-SCNC: 26 MMOL/L (ref 21–32)
CREAT SERPL-MCNC: 0.62 MG/DL (ref 0.6–1.3)
EOSINOPHIL # BLD AUTO: 0.1 THOUSAND/ΜL (ref 0–0.61)
EOSINOPHIL NFR BLD AUTO: 2 % (ref 0–6)
ERYTHROCYTE [DISTWIDTH] IN BLOOD BY AUTOMATED COUNT: 12.3 % (ref 11.6–15.1)
GFR SERPL CREATININE-BSD FRML MDRD: 94 ML/MIN/1.73SQ M
GLUCOSE P FAST SERPL-MCNC: 103 MG/DL (ref 65–99)
HCT VFR BLD AUTO: 40.5 % (ref 34.8–46.1)
HGB BLD-MCNC: 13.7 G/DL (ref 11.5–15.4)
IMM GRANULOCYTES # BLD AUTO: 0.05 THOUSAND/UL (ref 0–0.2)
IMM GRANULOCYTES NFR BLD AUTO: 1 % (ref 0–2)
INR PPP: 0.94 (ref 0.84–1.19)
LYMPHOCYTES # BLD AUTO: 2.08 THOUSANDS/ΜL (ref 0.6–4.47)
LYMPHOCYTES NFR BLD AUTO: 35 % (ref 14–44)
MCH RBC QN AUTO: 31.8 PG (ref 26.8–34.3)
MCHC RBC AUTO-ENTMCNC: 33.8 G/DL (ref 31.4–37.4)
MCV RBC AUTO: 94 FL (ref 82–98)
MONOCYTES # BLD AUTO: 0.52 THOUSAND/ΜL (ref 0.17–1.22)
MONOCYTES NFR BLD AUTO: 9 % (ref 4–12)
NEUTROPHILS # BLD AUTO: 3.13 THOUSANDS/ΜL (ref 1.85–7.62)
NEUTS SEG NFR BLD AUTO: 52 % (ref 43–75)
NRBC BLD AUTO-RTO: 0 /100 WBCS
PLATELET # BLD AUTO: 325 THOUSANDS/UL (ref 149–390)
PMV BLD AUTO: 10.8 FL (ref 8.9–12.7)
POTASSIUM SERPL-SCNC: 3.8 MMOL/L (ref 3.5–5.3)
PROT SERPL-MCNC: 7.9 G/DL (ref 6.4–8.2)
PROTHROMBIN TIME: 12.2 SECONDS (ref 11.6–14.5)
RBC # BLD AUTO: 4.31 MILLION/UL (ref 3.81–5.12)
SODIUM SERPL-SCNC: 134 MMOL/L (ref 136–145)
WBC # BLD AUTO: 5.93 THOUSAND/UL (ref 4.31–10.16)

## 2021-09-09 PROCEDURE — 36415 COLL VENOUS BLD VENIPUNCTURE: CPT

## 2021-09-09 PROCEDURE — 85025 COMPLETE CBC W/AUTO DIFF WBC: CPT

## 2021-09-09 PROCEDURE — 80053 COMPREHEN METABOLIC PANEL: CPT

## 2021-09-09 PROCEDURE — 85610 PROTHROMBIN TIME: CPT

## 2021-09-10 ENCOUNTER — OFFICE VISIT (OUTPATIENT)
Dept: GASTROENTEROLOGY | Facility: CLINIC | Age: 66
End: 2021-09-10
Payer: COMMERCIAL

## 2021-09-10 VITALS
HEIGHT: 67 IN | WEIGHT: 192 LBS | SYSTOLIC BLOOD PRESSURE: 144 MMHG | DIASTOLIC BLOOD PRESSURE: 94 MMHG | HEART RATE: 102 BPM | BODY MASS INDEX: 30.13 KG/M2

## 2021-09-10 DIAGNOSIS — K22.70 BARRETT'S ESOPHAGUS WITHOUT DYSPLASIA: ICD-10-CM

## 2021-09-10 DIAGNOSIS — Z86.010 PERSONAL HISTORY OF COLONIC POLYPS: ICD-10-CM

## 2021-09-10 DIAGNOSIS — K21.9 GASTROESOPHAGEAL REFLUX DISEASE WITHOUT ESOPHAGITIS: Primary | ICD-10-CM

## 2021-09-10 DIAGNOSIS — K74.3 PRIMARY BILIARY CIRRHOSIS (HCC): ICD-10-CM

## 2021-09-10 PROCEDURE — 99214 OFFICE O/P EST MOD 30 MIN: CPT | Performed by: INTERNAL MEDICINE

## 2021-09-10 RX ORDER — PANTOPRAZOLE SODIUM 40 MG/1
40 TABLET, DELAYED RELEASE ORAL 2 TIMES DAILY
Qty: 180 TABLET | Refills: 12 | Status: SHIPPED | OUTPATIENT
Start: 2021-09-10

## 2021-09-10 NOTE — PROGRESS NOTES
[id number] - Outpatient Follow-up Note  Leon Jay 78 y o  female MRN: [id number]  Encounter: [id number]    ASSESSMENT AND PLAN:      1  Gastroesophageal reflux disease without esophagitis  Having increased breakthrough heartburn requiring increasing doses of as needed antacids  - Increase pantoprazole (PROTONIX) 40 mg tablet; Take 1 tablet (40 mg total) by mouth 2 (two) times a day  Dispense: 180 tablet; Refill: 12  - May need to repeat EGD sooner than June 2022    2  Morales's esophagus without dysplasia  Last EGD June 2019  Due for follow up 2023    3  Primary biliary cirrhosis ([facility] [address] )  Diagnosed 2017 when she was residing [place] and found to have elevated LFTs and a positive antimitochondrial antibody  Never underwent a liver biopsy  Has been maintained on Actigall since that time  Alk Phos minimally increased when hospitalized with COVID but now normal  - Continue Actigall  - Recheck CMP/CBC/INR 1 year    4  Personal history of colonic polyps  Last colonoscopy June 2019  Due for follow up 2025      Followup Appointment: 4 months  ______________________________________________________________________    Chief Complaint   Patient presents with    Annual Exam     HPI:  The patient is seen back in follow up  Since the last time we saw her she has been diagnosed with breast cancer treated with lumpectomy and intra-op radiation and was hospitalized with COVID  From a GERD standpoint she is having increased heartburn issues despite the pantoprazole daily  She is taking antacids multiple times a week  She denies any dysphagia, odynophagia, or early satiety  She denies any nausea/vomiting, chest pain, or GI bleeding    Her weight is stable    Historical Information   Past Medical History:   Diagnosis Date    Anxiety     Morales esophagus     [facility] ) 09/24/2020    right mucinous Ca    Colon polyp     Gastroesophageal reflux disease without esophagitis 5/14/2019    GERD (gastroesophageal reflux disease)     History of gallstones     Hypertension     Personal history of colonic polyps 5/14/2019    Primary biliary cirrhosis ([address] ) 5/14/2019    Seasonal allergies      Past Surgical History:   Procedure Laterality Date    APPENDECTOMY      BREAST BIOPSY Right 09/24/2020    stereo-  ucinous ca    BREAST LUMPECTOMY Right 12/22/2020    Procedure: BREAST NEEDLE LOCALIZED LUMPECTOMY (NEEDLE LOC AT 1230); Surgeon: Tomasa Arita MD;  [facility];  Service: Surgical Oncology    COLONOSCOPY  06/24/2020     2 adenomas  Five year recall advised    INTRAOPERATIVE RADIATION THERAPY (IORT) Right 12/22/2020    Procedure: BREAST INTRAOPERATIVE RADIATION THERAPY (IORT) BY DR Krystina Mckenzie;  Surgeon: Tomasa Arita MD;  [facility];  Service: Surgical Oncology    LYMPH NODE BIOPSY Right 12/22/2020    Procedure: SENTINEL LYMPH NODE BIOPSY; LYMPHATIC MAPPING WITH BLUE DYE AND RADIOACTIVE DYE (INJECT AT 1400 BY DR KNOX IN THE OR); Surgeon: Tomasa Arita MD;  [facility];  Service: Surgical Oncology    MAMMO NEEDLE LOCALIZATION RIGHT (ALL INC) Right 12/22/2020    MAMMO STEREOTACTIC BREAST BIOPSY RIGHT (ALL INC) Right 9/24/2020    TUBAL LIGATION      UPPER GASTROINTESTINAL ENDOSCOPY  06/24/2019    irregular z-line    biopsies negative for Morales's     Social History     Substance and Sexual Activity   Alcohol Use Yes     Social History     Substance and Sexual Activity   Drug Use Never     Social History     Tobacco Use   Smoking Status Never Smoker   Smokeless Tobacco Never Used     Family History   Problem Relation Age of Onset    No Known Problems Mother     Lung cancer Father 48    No Known Problems Sister     No Known Problems Brother     No Known Problems Daughter     No Known Problems Maternal Grandmother     No Known Problems Maternal Grandfather     No Known Problems Paternal Grandmother     No Known Problems Paternal Grandfather     No Known Problems Sister     No Known Problems Maternal Aunt     No Known Problems Paternal Aunt     No Known Problems Paternal Aunt     Colon cancer Neg Hx     Colon polyps Neg Hx          Current Outpatient Medications:     anastrozole (ARIMIDEX) 1 mg tablet    Azelastine HCl 137 MCG/SPRAY SOLN    Bacillus Coagulans-Inulin (Probiotic-Prebiotic) 1-250 BILLION-MG CAPS    Calcium Carbonate-Vitamin D (Calcium 500 + D) 500-125 MG-UNIT TABS    desvenlafaxine succinate (PRISTIQ) 50 mg 24 hr tablet    loratadine-pseudoephedrine (CLARITIN-D 24-HOUR)  mg per 24 hr tablet    Multiple Vitamins-Minerals (MULTIVITAMIN WOMEN 50+ PO)    Turmeric Curcumin 500 MG CAPS    ursodiol (ACTIGALL) 500 MG tablet    zolpidem (AMBIEN) 10 mg tablet    albuterol (PROVENTIL HFA,VENTOLIN HFA) 90 mcg/act inhaler    ascorbic acid (VITAMIN C) 1000 MG tablet    benzonatate (TESSALON) 200 MG capsule    cholecalciferol (VITAMIN D3) 1,000 units tablet    famotidine (PEPCID) 20 mg tablet    irbesartan (AVAPRO) 150 mg tablet    ondansetron (ZOFRAN-ODT) 4 mg disintegrating tablet    pantoprazole (PROTONIX) 40 mg tablet    zinc sulfate (ZINCATE) 220 mg capsule  Allergies   Allergen Reactions    Aspirin GI Intolerance     Reviewed medications and allergies and updated as indicated    PHYSICAL EXAM:    Blood pressure 144/94, pulse 102, height 5' 7" (1 702 m), weight 87 1 kg (192 lb)  Body mass index is 30 07 kg/m²  General Appearance: NAD, cooperative, alert  Eyes: Anicteric, PERRLA, EOMI  ENT:  Normocephalic, atraumatic, normal mucosa  Neck:  Supple, symmetrical, trachea midline  Resp:  Clear to auscultation bilaterally; no rales, rhonchi or wheezing; respirations unlabored   CV:  S1 S2, Regular rate and rhythm; no murmur, rub, or gallop  GI:  Soft, non-tender, non-distended; normal bowel sounds; no masses, no organomegaly   Rectal: Deferred  Musculoskeletal: No cyanosis, clubbing or edema  Normal ROM    Skin:  No jaundice, rashes, or lesions   Heme/Lymph: No palpable cervical lymphadenopathy  Psych: Normal affect, good eye contact  Neuro: No gross deficits, AAOx3    Lab Results:   Lab Results   Component Value Date    WBC 5 93 09/09/2021    HGB 13 7 09/09/2021    HCT 40 5 09/09/2021    MCV 94 09/09/2021     09/09/2021     Lab Results   Component Value Date    K 3 8 09/09/2021     09/09/2021    CO2 26 09/09/2021    BUN 17 09/09/2021    CREATININE 0 62 09/09/2021    GLUF 103 (H) 09/09/2021    CALCIUM 9 3 09/09/2021    CORRECTEDCA 9 9 05/07/2021    AST 14 09/09/2021    ALT 28 09/09/2021    ALKPHOS 115 09/09/2021    EGFR 94 09/09/2021     Lab Results   Component Value Date    FERRITIN 223 05/06/2021       Radiology Results:   Nothing significant recently

## 2021-09-10 NOTE — LETTER
September 10, 2021     Payton Castellanos DO  [address] [facility]  [facility] [address]  [address] [address]    Patient: Leon Jay   YOB: 1955   Date of Visit: 9/10/2021       Dear Dr Sybil Lundberg: Thank you for referring Leon Jay to me for evaluation  Below are my notes for this consultation  If you have questions, please do not hesitate to call me  I look forward to following your patient along with you  Sincerely,        Da Nicole MD        CC: No Recipients  Da Nicole MD  9/10/2021 11:38 AM  Sign when Signing Visit  [facility] - Outpatient Follow-up Note  Leon Jay 78 y o  female MRN: [id number]  Encounter: [id number]    ASSESSMENT AND PLAN:      1  Gastroesophageal reflux disease without esophagitis  Having increased breakthrough heartburn requiring increasing doses of as needed antacids  - Increase pantoprazole (PROTONIX) 40 mg tablet; Take 1 tablet (40 mg total) by mouth 2 (two) times a day  Dispense: 180 tablet; Refill: 12  - May need to repeat EGD sooner than June 2022    2  Morales's esophagus without dysplasia  Last EGD June 2019  Due for follow up 2023    3  Primary biliary cirrhosis ([address] )  Diagnosed 2017 when she was residing [place] and found to have elevated LFTs and a positive antimitochondrial antibody  Never underwent a liver biopsy  Has been maintained on Actigall since that time  Alk Phos minimally increased when hospitalized with COVID but now normal  - Continue Actigall  - Recheck CMP/CBC/INR 1 year    4  Personal history of colonic polyps  Last colonoscopy June 2019  Due for follow up 2025      Followup Appointment: 4 months  ______________________________________________________________________    Chief Complaint   Patient presents with    Annual Exam     HPI:  The patient is seen back in follow up    Since the last time we saw her she has been diagnosed with breast cancer treated with lumpectomy and intra-op radiation and was hospitalized with COVID  From a GERD standpoint she is having increased heartburn issues despite the pantoprazole daily  She is taking antacids multiple times a week  She denies any dysphagia, odynophagia, or early satiety  She denies any nausea/vomiting, chest pain, or GI bleeding  Her weight is stable    Historical Information   Past Medical History:   Diagnosis Date    Anxiety     Morales esophagus     Breast cancer ([facility] ) 09/24/2020    right mucinous Ca    Colon polyp     Gastroesophageal reflux disease without esophagitis 5/14/2019    GERD (gastroesophageal reflux disease)     History of gallstones     Hypertension     Personal history of colonic polyps 5/14/2019    Primary biliary cirrhosis ([facility] ) 5/14/2019    Seasonal allergies      Past Surgical History:   Procedure Laterality Date    APPENDECTOMY      BREAST BIOPSY Right 09/24/2020    stereo-  ucinous ca    BREAST LUMPECTOMY Right 12/22/2020    Procedure: BREAST NEEDLE LOCALIZED LUMPECTOMY (NEEDLE LOC AT 1230); Surgeon: Deejay Leong MD;  [facility];  Service: Surgical Oncology    COLONOSCOPY  06/24/2020     2 adenomas  Five year recall advised    INTRAOPERATIVE RADIATION THERAPY (IORT) Right 12/22/2020    Procedure: BREAST INTRAOPERATIVE RADIATION THERAPY (IORT) BY DR Becca Dukes;  Surgeon: Deejay Leong MD;  [facility];  Service: Surgical Oncology    LYMPH NODE BIOPSY Right 12/22/2020    Procedure: SENTINEL LYMPH NODE BIOPSY; LYMPHATIC MAPPING WITH BLUE DYE AND RADIOACTIVE DYE (INJECT AT 1400 BY DR KNOX IN THE OR); Surgeon: Deejay Leong MD;  [facility];  Service: Surgical Oncology    MAMMO NEEDLE LOCALIZATION RIGHT (ALL INC) Right 12/22/2020    MAMMO STEREOTACTIC BREAST BIOPSY RIGHT (ALL INC) Right 9/24/2020    TUBAL LIGATION      UPPER GASTROINTESTINAL ENDOSCOPY  06/24/2019    irregular z-line    biopsies negative for Morales's     Social History     Substance and Sexual Activity Alcohol Use Yes     Social History     Substance and Sexual Activity   Drug Use Never     Social History     Tobacco Use   Smoking Status Never Smoker   Smokeless Tobacco Never Used     Family History   Problem Relation Age of Onset    No Known Problems Mother     Lung cancer Father 48    No Known Problems Sister     No Known Problems Brother     No Known Problems Daughter     No Known Problems Maternal Grandmother     No Known Problems Maternal Grandfather     No Known Problems Paternal Grandmother     No Known Problems Paternal Grandfather     No Known Problems Sister     No Known Problems Maternal Aunt     No Known Problems Paternal Aunt     No Known Problems Paternal Aunt     Colon cancer Neg Hx     Colon polyps Neg Hx          Current Outpatient Medications:     anastrozole (ARIMIDEX) 1 mg tablet    Azelastine HCl 137 MCG/SPRAY SOLN    Bacillus Coagulans-Inulin (Probiotic-Prebiotic) 1-250 BILLION-MG CAPS    Calcium Carbonate-Vitamin D (Calcium 500 + D) 500-125 MG-UNIT TABS    desvenlafaxine succinate (PRISTIQ) 50 mg 24 hr tablet    loratadine-pseudoephedrine (CLARITIN-D 24-HOUR)  mg per 24 hr tablet    Multiple Vitamins-Minerals (MULTIVITAMIN WOMEN 50+ PO)    Turmeric Curcumin 500 MG CAPS    ursodiol (ACTIGALL) 500 MG tablet    zolpidem (AMBIEN) 10 mg tablet    albuterol (PROVENTIL HFA,VENTOLIN HFA) 90 mcg/act inhaler    ascorbic acid (VITAMIN C) 1000 MG tablet    benzonatate (TESSALON) 200 MG capsule    cholecalciferol (VITAMIN D3) 1,000 units tablet    famotidine (PEPCID) 20 mg tablet    irbesartan (AVAPRO) 150 mg tablet    ondansetron (ZOFRAN-ODT) 4 mg disintegrating tablet    pantoprazole (PROTONIX) 40 mg tablet    zinc sulfate (ZINCATE) 220 mg capsule  Allergies   Allergen Reactions    Aspirin GI Intolerance     Reviewed medications and allergies and updated as indicated    PHYSICAL EXAM:    Blood pressure 144/94, pulse 102, height 5' 7" (1 702 m), weight 87 1 kg (192 lb)  Body mass index is 30 07 kg/m²  General Appearance: NAD, cooperative, alert  Eyes: Anicteric, PERRLA, EOMI  ENT:  Normocephalic, atraumatic, normal mucosa  Neck:  Supple, symmetrical, trachea midline  Resp:  Clear to auscultation bilaterally; no rales, rhonchi or wheezing; respirations unlabored   CV:  S1 S2, Regular rate and rhythm; no murmur, rub, or gallop  GI:  Soft, non-tender, non-distended; normal bowel sounds; no masses, no organomegaly   Rectal: Deferred  Musculoskeletal: No cyanosis, clubbing or edema  Normal ROM    Skin:  No jaundice, rashes, or lesions   Heme/Lymph: No palpable cervical lymphadenopathy  Psych: Normal affect, good eye contact  Neuro: No gross deficits, AAOx3    Lab Results:   Lab Results   Component Value Date    WBC 5 93 09/09/2021    HGB 13 7 09/09/2021    HCT 40 5 09/09/2021    MCV 94 09/09/2021     09/09/2021     Lab Results   Component Value Date    K 3 8 09/09/2021     09/09/2021    CO2 26 09/09/2021    BUN 17 09/09/2021    CREATININE 0 62 09/09/2021    GLUF 103 (H) 09/09/2021    CALCIUM 9 3 09/09/2021    CORRECTEDCA 9 9 05/07/2021    AST 14 09/09/2021    ALT 28 09/09/2021    ALKPHOS 115 09/09/2021    EGFR 94 09/09/2021     Lab Results   Component Value Date    FERRITIN 223 05/06/2021       Radiology Results:   Nothing significant recently

## 2021-10-20 ENCOUNTER — OFFICE VISIT (OUTPATIENT)
Dept: SURGICAL ONCOLOGY | Facility: CLINIC | Age: 66
End: 2021-10-20
Payer: COMMERCIAL

## 2021-10-20 VITALS
OXYGEN SATURATION: 98 % | HEART RATE: 102 BPM | HEIGHT: 67 IN | SYSTOLIC BLOOD PRESSURE: 130 MMHG | TEMPERATURE: 98 F | DIASTOLIC BLOOD PRESSURE: 78 MMHG | RESPIRATION RATE: 16 BRPM | BODY MASS INDEX: 30.29 KG/M2 | WEIGHT: 193 LBS

## 2021-10-20 DIAGNOSIS — C50.411 MALIGNANT NEOPLASM OF UPPER-OUTER QUADRANT OF RIGHT BREAST IN FEMALE, ESTROGEN RECEPTOR POSITIVE (HCC): Primary | ICD-10-CM

## 2021-10-20 DIAGNOSIS — Z17.0 MALIGNANT NEOPLASM OF UPPER-OUTER QUADRANT OF RIGHT BREAST IN FEMALE, ESTROGEN RECEPTOR POSITIVE (HCC): Primary | ICD-10-CM

## 2021-10-20 DIAGNOSIS — Z79.811 USE OF ANASTROZOLE (ARIMIDEX): ICD-10-CM

## 2021-10-20 PROCEDURE — 99214 OFFICE O/P EST MOD 30 MIN: CPT | Performed by: SURGERY

## 2021-10-29 ENCOUNTER — TELEPHONE (OUTPATIENT)
Dept: GASTROENTEROLOGY | Facility: CLINIC | Age: 66
End: 2021-10-29

## 2021-12-29 DIAGNOSIS — Z17.0 MALIGNANT NEOPLASM OF UPPER-OUTER QUADRANT OF RIGHT BREAST IN FEMALE, ESTROGEN RECEPTOR POSITIVE (HCC): ICD-10-CM

## 2021-12-29 DIAGNOSIS — C50.411 MALIGNANT NEOPLASM OF UPPER-OUTER QUADRANT OF RIGHT BREAST IN FEMALE, ESTROGEN RECEPTOR POSITIVE (HCC): ICD-10-CM

## 2021-12-29 RX ORDER — ANASTROZOLE 1 MG/1
TABLET ORAL
Qty: 90 TABLET | Refills: 1 | Status: SHIPPED | OUTPATIENT
Start: 2021-12-29 | End: 2022-04-19

## 2022-04-11 ENCOUNTER — TELEPHONE (OUTPATIENT)
Dept: HEMATOLOGY ONCOLOGY | Facility: CLINIC | Age: 67
End: 2022-04-11

## 2022-04-19 DIAGNOSIS — Z17.0 MALIGNANT NEOPLASM OF UPPER-OUTER QUADRANT OF RIGHT BREAST IN FEMALE, ESTROGEN RECEPTOR POSITIVE (HCC): ICD-10-CM

## 2022-04-19 DIAGNOSIS — C50.411 MALIGNANT NEOPLASM OF UPPER-OUTER QUADRANT OF RIGHT BREAST IN FEMALE, ESTROGEN RECEPTOR POSITIVE (HCC): ICD-10-CM

## 2022-04-19 RX ORDER — ANASTROZOLE 1 MG/1
TABLET ORAL
Qty: 90 TABLET | Refills: 1 | Status: SHIPPED | OUTPATIENT
Start: 2022-04-19

## 2022-04-20 ENCOUNTER — OFFICE VISIT (OUTPATIENT)
Dept: SURGICAL ONCOLOGY | Facility: CLINIC | Age: 67
End: 2022-04-20
Payer: COMMERCIAL

## 2022-04-20 ENCOUNTER — TELEPHONE (OUTPATIENT)
Dept: GASTROENTEROLOGY | Facility: CLINIC | Age: 67
End: 2022-04-20

## 2022-04-20 VITALS
DIASTOLIC BLOOD PRESSURE: 98 MMHG | HEART RATE: 95 BPM | OXYGEN SATURATION: 99 % | TEMPERATURE: 97.8 F | RESPIRATION RATE: 17 BRPM | WEIGHT: 199 LBS | BODY MASS INDEX: 31.23 KG/M2 | HEIGHT: 67 IN | SYSTOLIC BLOOD PRESSURE: 158 MMHG

## 2022-04-20 DIAGNOSIS — C50.411 MALIGNANT NEOPLASM OF UPPER-OUTER QUADRANT OF RIGHT BREAST IN FEMALE, ESTROGEN RECEPTOR POSITIVE (HCC): Primary | ICD-10-CM

## 2022-04-20 DIAGNOSIS — Z17.0 MALIGNANT NEOPLASM OF UPPER-OUTER QUADRANT OF RIGHT BREAST IN FEMALE, ESTROGEN RECEPTOR POSITIVE (HCC): Primary | ICD-10-CM

## 2022-04-20 DIAGNOSIS — Z79.811 USE OF ANASTROZOLE (ARIMIDEX): ICD-10-CM

## 2022-04-20 DIAGNOSIS — K74.3 PRIMARY BILIARY CIRRHOSIS (HCC): ICD-10-CM

## 2022-04-20 PROCEDURE — 99214 OFFICE O/P EST MOD 30 MIN: CPT | Performed by: SURGERY

## 2022-04-20 RX ORDER — URSODIOL 500 MG/1
1000 TABLET, FILM COATED ORAL 2 TIMES DAILY
Qty: 120 TABLET | Refills: 0 | Status: SHIPPED | OUTPATIENT
Start: 2022-04-20 | End: 2022-05-17

## 2022-04-20 NOTE — TELEPHONE ENCOUNTER
Per Dr Rosy Torres note in 9/2021 pt needs labs in one year  However patient was to f/u in office in 3-4 months  Routing to  to contact patient to set up appt

## 2022-04-20 NOTE — PROGRESS NOTES
Surgical Oncology Follow Up       [address]  [facility]  [address] [facility] [address]  [facility] [address]    Daren Amador  1955  [id number]  [address] [facility],[address]  [facility]  [address] [address]    Chief Complaint   Patient presents with    Follow-up          Assessment & Plan:   But her showed a presents today for a six-month follow-up visit  She had intraoperative radiation therapy she is on anastrozole and tolerating that quite well  Her mammogram from last August showed no worrisome findings we will coordinate a mammogram for her in 6 months  She is agreeable to follow-up with pre at her next 6 month follow-up visit  She has no evidence of local regional or distant recurrence disease on her exam today  Cancer History:     Oncology History   Malignant neoplasm of upper-outer quadrant of right breast in female, estrogen receptor positive ([facility] )   9/24/2020 Biopsy    Right breast stereotactic biopsy  10 o'clock,   Mucinous carcinoma, 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, both 1 1 mm, 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  Grade 1    RI 25  HER2 1+  Lymphovascular invasion not identified    Concordant  Malignancy appears unifocal; calcifications span 2 3cm  US right axila has not been performed  Left breast US recommended   10/6/2020 B/L US - no right axillary adenopathy, benign findings in left breast      12/22/2020 Surgery    Right breast needle localized lumpectomy with sentinel lymph node biopsy  Mucinous carcinoma  Grade 1  1 1 mm  Extensive DCIS (at least 4 cm)  DCIS less than 1 mm form lateral posterior lumpectomy margin  0/1 Lymph node  Anatomic/Prognostic Stage IA     12/22/2020 - 12/22/2020 Radiation    (Treatments not in [facility])  IORT with Dr Kisha Miner [facility] Starting  Ending  Elapsed  Fraction Total  Overlap Site Overlap         Date Date Days Dose Dose   Dose    IORT 50 kVp  Rt Breast   12/22/20 12/22/20  0  2000 cGy  2000 cGy                                   1/15/2021 -  Hormone Therapy    Anastrozole 1 mg daily    Dr Philly Douglas           Interval History:   Patient has no significant complaints since her last visit  She does mention that occasionally when somebody bumps up against her scar there is a small intermittent sharp discomfort  She is tolerating her anastrozole well  Review of Systems:   Review of Systems   Constitutional: Negative for activity change, appetite change and fatigue  HENT: Negative  Eyes: Negative  Respiratory: Negative for cough, shortness of breath and wheezing  Cardiovascular: Negative for chest pain and leg swelling  Gastrointestinal: Negative  Endocrine: Negative  Genitourinary: Negative  Musculoskeletal:        No new changes or complaints of bone pain   Skin: Negative  Allergic/Immunologic: Negative  Neurological: Negative  Hematological: Negative  Psychiatric/Behavioral: Negative          Past Medical History     Patient Active Problem List   Diagnosis    Personal history of colonic polyps    Gastroesophageal reflux disease without esophagitis    Primary biliary cirrhosis ([facility] )    Morales's esophagus without dysplasia    Malignant neoplasm of upper-outer quadrant of right breast in female, estrogen receptor positive [facility]    Use of anastrozole (Arimidex)    COVID-19     Past Medical History:   Diagnosis Date    Anxiety     Morales esophagus     Breast cancer ([facility] ) 09/24/2020    right mucinous Ca    Colon polyp     Gastroesophageal reflux disease without esophagitis 5/14/2019    GERD (gastroesophageal reflux disease)     History of gallstones     Hypertension     Personal history of colonic polyps 5/14/2019    Primary biliary cirrhosis ([facility] ) 5/14/2019    Seasonal allergies      Past Surgical History:   Procedure Laterality Date    APPENDECTOMY      BREAST BIOPSY Right 09/24/2020    stereo-  ucinous ca    BREAST LUMPECTOMY Right 12/22/2020    Procedure: BREAST NEEDLE LOCALIZED LUMPECTOMY (NEEDLE LOC AT 1230); Surgeon: Shannon Garrido MD;  [facility];  Service: Surgical Oncology    COLONOSCOPY  06/24/2020     2 adenomas  Five year recall advised    INTRAOPERATIVE RADIATION THERAPY (IORT) Right 12/22/2020    Procedure: BREAST INTRAOPERATIVE RADIATION THERAPY (IORT) BY DR Melyssa Koenig;  Surgeon: Shannon Garrido MD;  [facility];  Service: Surgical Oncology    LYMPH NODE BIOPSY Right 12/22/2020    Procedure: SENTINEL LYMPH NODE BIOPSY; LYMPHATIC MAPPING WITH BLUE DYE AND RADIOACTIVE DYE (INJECT AT 1400 BY DR KNOX IN THE OR); Surgeon: Shannon Garrido MD;  [facility];  Service: Surgical Oncology    MAMMO NEEDLE LOCALIZATION RIGHT (ALL INC) Right 12/22/2020    MAMMO STEREOTACTIC BREAST BIOPSY RIGHT (ALL INC) Right 9/24/2020    TUBAL LIGATION      UPPER GASTROINTESTINAL ENDOSCOPY  06/24/2019    irregular z-line    biopsies negative for Morales's     Family History   Problem Relation Age of Onset    No Known Problems Mother     Lung cancer Father 48    No Known Problems Sister     No Known Problems Sister     No Known Problems Brother     No Known Problems Maternal Grandmother     No Known Problems Maternal Grandfather     No Known Problems Paternal Grandmother     No Known Problems Paternal Grandfather     No Known Problems Daughter     No Known Problems Maternal Aunt     No Known Problems Paternal Aunt     No Known Problems Paternal Aunt      Social History     Socioeconomic History    Marital status: /Civil Union     Spouse name: Not on file    Number of children: Not on file    Years of education: Not on file    Highest education level: Not on file   Occupational History    Not on file   Tobacco Use    Smoking status: Never Smoker    Smokeless tobacco: Never Used   Vaping Use    Vaping Use: Never used   Substance and Sexual Activity    Alcohol use:  Yes    Drug use: Never    Sexual activity: Not on file   Other Topics Concern    Not on file   Social History Narrative    Not on file     Social Determinants of Health     Financial Resource Strain: Not on file   Food Insecurity: Not on file   Transportation Needs: Not on file   Physical Activity: Not on file   Stress: Not on file   Social Connections: Not on file   Intimate Partner Violence: Not on file   Housing Stability: Not on file       Current Outpatient Medications:     albuterol (PROVENTIL HFA,VENTOLIN HFA) 90 mcg/act inhaler, Inhale 1 puff every 4 (four) hours as needed for wheezing, Disp: , Rfl:     anastrozole (ARIMIDEX) 1 mg tablet, TAKE 1 TABLET BY MOUTH EVERY DAY, Disp: 90 tablet, Rfl: 1    Azelastine HCl 137 MCG/SPRAY SOLN, into each nostril daily as needed , Disp: , Rfl:     Bacillus Coagulans-Inulin (Probiotic-Prebiotic) 1-250 BILLION-MG CAPS, Take by mouth daily, Disp: , Rfl:     Calcium Carbonate-Vitamin D (Calcium 500 + D) 500-125 MG-UNIT TABS, Take by mouth daily, Disp: , Rfl:     cholecalciferol (VITAMIN D3) 1,000 units tablet, Take 2 tablets (2,000 Units total) by mouth daily, Disp: 30 tablet, Rfl: 0    desvenlafaxine succinate (PRISTIQ) 50 mg 24 hr tablet, Take 50 mg by mouth daily , Disp: , Rfl:     loratadine-pseudoephedrine (CLARITIN-D 24-HOUR)  mg per 24 hr tablet, Take 1 tablet by mouth as needed for allergies, Disp: , Rfl:     Multiple Vitamins-Minerals (MULTIVITAMIN WOMEN 50+ PO), Take by mouth daily , Disp: , Rfl:     pantoprazole (PROTONIX) 40 mg tablet, Take 1 tablet (40 mg total) by mouth 2 (two) times a day, Disp: 180 tablet, Rfl: 12    Turmeric Curcumin 500 MG CAPS, Take by mouth daily, Disp: , Rfl:     zolpidem (AMBIEN) 10 mg tablet, Take 10 mg by mouth daily at bedtime, Disp: , Rfl:     ascorbic acid (VITAMIN C) 1000 MG tablet, Take 1 tablet (1,000 mg total) by mouth every 12 (twelve) hours for 11 doses, Disp: 11 tablet, Rfl: 0   benzonatate (TESSALON) 200 MG capsule, Take 1 capsule (200 mg total) by mouth 3 (three) times a day as needed for cough (Patient not taking: Reported on 4/20/2022 ), Disp: 20 capsule, Rfl: 0    famotidine (PEPCID) 20 mg tablet, Take 1 tablet (20 mg total) by mouth 2 (two) times a day (Patient not taking: Reported on 4/20/2022 ), Disp: 60 tablet, Rfl: 0    irbesartan (AVAPRO) 150 mg tablet, Take 150 mg by mouth daily at bedtime (Patient not taking: Reported on 4/20/2022 ), Disp: , Rfl:     ondansetron (ZOFRAN-ODT) 4 mg disintegrating tablet, Take 1 tablet (4 mg total) by mouth every 6 (six) hours as needed for nausea or vomiting (Patient not taking: Reported on 4/20/2022 ), Disp: 20 tablet, Rfl: 0    ursodiol (ACTIGALL) 500 MG tablet, TAKE 2 TABLETS BY MOUTH TWICE A DAY (Patient not taking: Reported on 4/20/2022), Disp: 360 tablet, Rfl: 4    zinc sulfate (ZINCATE) 220 mg capsule, Take 1 capsule (220 mg total) by mouth daily for 5 doses, Disp: 5 capsule, Rfl: 0  Allergies   Allergen Reactions    Aspirin GI Intolerance       Physical Exam:     Vitals:    04/20/22 1049   BP: 158/98   Pulse: 95   Resp: 17   Temp: 97 8 °F (36 6 °C)   SpO2: 99%     Physical Exam  Vitals reviewed  Constitutional:       Appearance: She is well-developed  HENT:      Head: Normocephalic and atraumatic  Eyes:      Pupils: Pupils are equal, round, and reactive to light  Neck:      Thyroid: No thyromegaly  Vascular: No JVD  Trachea: No tracheal deviation  Cardiovascular:      Rate and Rhythm: Normal rate and regular rhythm  Heart sounds: Normal heart sounds  No murmur heard  No friction rub  No gallop  Pulmonary:      Effort: Pulmonary effort is normal  No respiratory distress  Breath sounds: Normal breath sounds  No wheezing or rales  Chest:          Comments: Both breasts were examined in the sitting and supine position   There are no worrisome skin lesions, no nipple retraction and no nipple discharge  There are no dominant masses, axillary adenopathy or supraclavicular adenopathy on either side  Abdominal:      General: There is no distension  Palpations: Abdomen is soft  There is no hepatomegaly or mass  Tenderness: There is no abdominal tenderness  There is no guarding or rebound  Musculoskeletal:         General: No tenderness  Normal range of motion  Cervical back: Normal range of motion and neck supple  Lymphadenopathy:      Cervical: No cervical adenopathy  Skin:     General: Skin is warm and dry  Findings: No erythema or rash  Neurological:      Mental Status: She is alert and oriented to person, place, and time  Cranial Nerves: No cranial nerve deficit  Psychiatric:         Behavior: Behavior normal            Results & Discussion:   Patient is free of any evidence of local regional distant recurrence disease  We will coordinate her mammograms for August and see her back in 6 months  She will remain on her anastrozole  She will contact us should she appreciate any changes or have concerns prior to that time  Advance Care Planning/Advance Directives:  I discussed the disease status, treatment plans and follow-up with the patient

## 2022-05-17 DIAGNOSIS — K74.3 PRIMARY BILIARY CIRRHOSIS (HCC): ICD-10-CM

## 2022-05-17 RX ORDER — URSODIOL 500 MG/1
TABLET, FILM COATED ORAL
Qty: 60 TABLET | Refills: 0 | Status: SHIPPED | OUTPATIENT
Start: 2022-05-17 | End: 2022-05-28

## 2022-05-26 ENCOUNTER — TELEPHONE (OUTPATIENT)
Dept: GASTROENTEROLOGY | Facility: CLINIC | Age: 67
End: 2022-05-26

## 2022-05-26 DIAGNOSIS — K74.3 PRIMARY BILIARY CIRRHOSIS (HCC): Primary | ICD-10-CM

## 2022-05-26 NOTE — TELEPHONE ENCOUNTER
Pt had an ov 9/10/21 and was recommended she repeat CMP, CBC and INR in 1 year  Pt has an apt 6/23--do you want her to repeat now before her visit or wait for the 1 year?     Pt notified question being sent to Dr Sp Rubin

## 2022-05-26 NOTE — TELEPHONE ENCOUNTER
Pt states she has an appt w/ [facility] in a couple wks and is supposed to have lab work done but no order at the lab  Conf'd OV 6/23 but no outstanding labs  She said when she sched'd the appt she was told labs would be submitted  Pt uses  lab  If Pilar Crooks # [id number]

## 2022-05-27 DIAGNOSIS — K74.3 PRIMARY BILIARY CIRRHOSIS (HCC): ICD-10-CM

## 2022-05-28 RX ORDER — URSODIOL 500 MG/1
TABLET, FILM COATED ORAL
Qty: 60 TABLET | Refills: 0 | Status: SHIPPED | OUTPATIENT
Start: 2022-05-28 | End: 2022-06-01 | Stop reason: SDUPTHER

## 2022-06-01 RX ORDER — URSODIOL 500 MG/1
TABLET, FILM COATED ORAL
Qty: 360 TABLET | Refills: 1 | Status: SHIPPED | OUTPATIENT
Start: 2022-06-01

## 2022-06-01 NOTE — TELEPHONE ENCOUNTER
Fax from [facility] [employer] that insurance only covers 90 day prescriptions    Needs new rx for Ursodiol 500 mg for 90 day quantity

## 2022-06-03 ENCOUNTER — APPOINTMENT (OUTPATIENT)
Dept: LAB | Facility: HOSPITAL | Age: 67
End: 2022-06-03
Attending: INTERNAL MEDICINE
Payer: COMMERCIAL

## 2022-06-03 DIAGNOSIS — K74.3 PRIMARY BILIARY CIRRHOSIS (HCC): ICD-10-CM

## 2022-06-03 LAB
ALBUMIN SERPL BCP-MCNC: 3.7 G/DL (ref 3.5–5)
ALP SERPL-CCNC: 120 U/L (ref 46–116)
ALT SERPL W P-5'-P-CCNC: 24 U/L (ref 12–78)
ANION GAP SERPL CALCULATED.3IONS-SCNC: 4 MMOL/L (ref 4–13)
AST SERPL W P-5'-P-CCNC: 16 U/L (ref 5–45)
BASOPHILS # BLD AUTO: 0.06 THOUSANDS/ΜL (ref 0–0.1)
BASOPHILS NFR BLD AUTO: 1 % (ref 0–1)
BILIRUB SERPL-MCNC: 0.65 MG/DL (ref 0.2–1)
BUN SERPL-MCNC: 14 MG/DL (ref 5–25)
CALCIUM SERPL-MCNC: 9.6 MG/DL (ref 8.3–10.1)
CHLORIDE SERPL-SCNC: 103 MMOL/L (ref 100–108)
CO2 SERPL-SCNC: 28 MMOL/L (ref 21–32)
CREAT SERPL-MCNC: 0.77 MG/DL (ref 0.6–1.3)
EOSINOPHIL # BLD AUTO: 0.12 THOUSAND/ΜL (ref 0–0.61)
EOSINOPHIL NFR BLD AUTO: 2 % (ref 0–6)
ERYTHROCYTE [DISTWIDTH] IN BLOOD BY AUTOMATED COUNT: 12.7 % (ref 11.6–15.1)
GFR SERPL CREATININE-BSD FRML MDRD: 80 ML/MIN/1.73SQ M
GLUCOSE SERPL-MCNC: 106 MG/DL (ref 65–140)
HCT VFR BLD AUTO: 39.4 % (ref 34.8–46.1)
HGB BLD-MCNC: 13.3 G/DL (ref 11.5–15.4)
IMM GRANULOCYTES # BLD AUTO: 0.04 THOUSAND/UL (ref 0–0.2)
IMM GRANULOCYTES NFR BLD AUTO: 1 % (ref 0–2)
INR PPP: 1.01 (ref 0.84–1.19)
LYMPHOCYTES # BLD AUTO: 2.33 THOUSANDS/ΜL (ref 0.6–4.47)
LYMPHOCYTES NFR BLD AUTO: 39 % (ref 14–44)
MCH RBC QN AUTO: 31.1 PG (ref 26.8–34.3)
MCHC RBC AUTO-ENTMCNC: 33.8 G/DL (ref 31.4–37.4)
MCV RBC AUTO: 92 FL (ref 82–98)
MONOCYTES # BLD AUTO: 0.47 THOUSAND/ΜL (ref 0.17–1.22)
MONOCYTES NFR BLD AUTO: 8 % (ref 4–12)
NEUTROPHILS # BLD AUTO: 2.98 THOUSANDS/ΜL (ref 1.85–7.62)
NEUTS SEG NFR BLD AUTO: 49 % (ref 43–75)
NRBC BLD AUTO-RTO: 0 /100 WBCS
PLATELET # BLD AUTO: 307 THOUSANDS/UL (ref 149–390)
PMV BLD AUTO: 11.1 FL (ref 8.9–12.7)
POTASSIUM SERPL-SCNC: 4.1 MMOL/L (ref 3.5–5.3)
PROT SERPL-MCNC: 7.9 G/DL (ref 6.4–8.2)
PROTHROMBIN TIME: 12.9 SECONDS (ref 11.6–14.5)
RBC # BLD AUTO: 4.28 MILLION/UL (ref 3.81–5.12)
SODIUM SERPL-SCNC: 135 MMOL/L (ref 136–145)
WBC # BLD AUTO: 6 THOUSAND/UL (ref 4.31–10.16)

## 2022-06-03 PROCEDURE — 85610 PROTHROMBIN TIME: CPT

## 2022-06-03 PROCEDURE — 80053 COMPREHEN METABOLIC PANEL: CPT

## 2022-06-03 PROCEDURE — 85025 COMPLETE CBC W/AUTO DIFF WBC: CPT

## 2022-06-03 PROCEDURE — 36415 COLL VENOUS BLD VENIPUNCTURE: CPT

## 2022-06-14 ENCOUNTER — TELEPHONE (OUTPATIENT)
Dept: SURGICAL ONCOLOGY | Facility: CLINIC | Age: 67
End: 2022-06-14

## 2022-06-24 ENCOUNTER — OFFICE VISIT (OUTPATIENT)
Dept: GASTROENTEROLOGY | Facility: CLINIC | Age: 67
End: 2022-06-24
Payer: COMMERCIAL

## 2022-06-24 ENCOUNTER — TELEPHONE (OUTPATIENT)
Dept: GASTROENTEROLOGY | Facility: CLINIC | Age: 67
End: 2022-06-24

## 2022-06-24 ENCOUNTER — PREP FOR PROCEDURE (OUTPATIENT)
Dept: GASTROENTEROLOGY | Facility: CLINIC | Age: 67
End: 2022-06-24

## 2022-06-24 VITALS
WEIGHT: 201.2 LBS | BODY MASS INDEX: 31.58 KG/M2 | HEIGHT: 67 IN | SYSTOLIC BLOOD PRESSURE: 148 MMHG | DIASTOLIC BLOOD PRESSURE: 80 MMHG

## 2022-06-24 DIAGNOSIS — K22.70 BARRETT'S ESOPHAGUS WITHOUT DYSPLASIA: Primary | ICD-10-CM

## 2022-06-24 DIAGNOSIS — K74.3 PRIMARY BILIARY CIRRHOSIS (HCC): ICD-10-CM

## 2022-06-24 DIAGNOSIS — K21.9 GASTROESOPHAGEAL REFLUX DISEASE WITHOUT ESOPHAGITIS: ICD-10-CM

## 2022-06-24 PROCEDURE — 99213 OFFICE O/P EST LOW 20 MIN: CPT | Performed by: INTERNAL MEDICINE

## 2022-06-24 NOTE — LETTER
June 24, 2022     Yves He DO  [address] [facility] [address]  [facility]  [address] [address]    Patient: Merline Spangler   YOB: 1955   Date of Visit: 6/24/2022       Dear Dr Allison Cox: Thank you for referring Merline Spangler to me for evaluation  Below are my notes for this consultation  If you have questions, please do not hesitate to call me  I look forward to following your patient along with you  Sincerely,        Andrew Altamirano MD        CC: No Recipients  Andrew Altamirano MD  6/24/2022  3:08 PM  Sign when Signing Visit  [address] [facility] - Outpatient Follow-up Note  Merline Spangler 78 y o  female MRN: [id number]  Encounter: [id number]    ASSESSMENT AND PLAN:      1  Morales's esophagus without dysplasia  Last EGD June 2019   - EGD at [facility]    2  Gastroesophageal reflux disease without esophagitis  Doing well on Protonix 40 milligrams twice a day  - continue current regimen    3  Primary biliary cirrhosis (HCC)  On Actigall  Blood work with minimally increased alkaline phosphatase  - Hepatic function panel; Future (6 months)  - repeat CMP/CBC/INR in 1 year    4  Personal history of colon polyps  Last colonoscopy June 2019  Due for follow-up 2024      Followup Appointment:  1 year  ______________________________________________________________________    Chief Complaint   Patient presents with    Follow-up     GERD       HPI:  The patient is seen back in the office today  From a GERD standpoint she is doing well  When I last saw her we increased her Protonix to twice a day which is controlling all her breakthrough symptoms  She denies any dysphagia, odynophagia, early satiety  She denies any nausea or vomiting  Her weight is stable  She denies any GI bleeding  Her bowels are normal   From a liver standpoint she denies any ascites or lower extremity edema  She denies any pruritus  She denies any jaundice    She continues take Sam for PBC    Historical Information   Past Medical History:   Diagnosis Date    Anxiety     Morales esophagus     [facility] cancer ([facility] ) 09/24/2020    right mucinous Ca    Colon polyp     Gastroesophageal reflux disease without esophagitis 5/14/2019    GERD (gastroesophageal reflux disease)     History of gallstones     Hypertension     Personal history of colonic polyps 5/14/2019    [facility] 5/14/2019    Seasonal allergies      Past Surgical History:   Procedure Laterality Date    APPENDECTOMY      BREAST BIOPSY Right 09/24/2020    stereo-  ucinous ca    BREAST LUMPECTOMY Right 12/22/2020    Procedure: BREAST NEEDLE LOCALIZED LUMPECTOMY (NEEDLE LOC AT 1230); Surgeon: Jackelyn Tineo MD;  [facility];  Service: Surgical Oncology    COLONOSCOPY  06/24/2020     2 adenomas  Five year recall advised    INTRAOPERATIVE RADIATION THERAPY (IORT) Right 12/22/2020    Procedure: BREAST INTRAOPERATIVE RADIATION THERAPY (IORT) BY DR Drake Marshall;  Surgeon: Jackelyn Tineo MD;  [facility];  Service: Surgical Oncology    LYMPH NODE BIOPSY Right 12/22/2020    Procedure: SENTINEL LYMPH NODE BIOPSY; LYMPHATIC MAPPING WITH BLUE DYE AND RADIOACTIVE DYE (INJECT AT 1400 BY DR KNOX IN THE OR); Surgeon: Jackelyn Tineo MD;  [facility];  Service: Surgical Oncology    MAMMO NEEDLE LOCALIZATION RIGHT (ALL INC) Right 12/22/2020    MAMMO STEREOTACTIC BREAST BIOPSY RIGHT (ALL INC) Right 9/24/2020    TUBAL LIGATION      UPPER GASTROINTESTINAL ENDOSCOPY  06/24/2019    irregular z-line    biopsies negative for Morales's     Social History     Substance and Sexual Activity   Alcohol Use Yes     Social History     Substance and Sexual Activity   Drug Use Never     Social History     Tobacco Use   Smoking Status Never Smoker   Smokeless Tobacco Never Used     Family History   Problem Relation Age of Onset    No Known Problems Mother     Lung cancer Father 48    No Known Problems Sister  No Known Problems Sister     No Known Problems Brother     No Known Problems Maternal Grandmother     No Known Problems Maternal Grandfather     No Known Problems Paternal Grandmother     No Known Problems Paternal Grandfather     No Known Problems Maternal Aunt     No Known Problems Paternal Aunt     No Known Problems Paternal Aunt     No Known Problems Daughter     Colon cancer Neg Hx     Colon polyps Neg Hx          Current Outpatient Medications:     albuterol (PROVENTIL HFA,VENTOLIN HFA) 90 mcg/act inhaler    anastrozole (ARIMIDEX) 1 mg tablet    ascorbic acid (VITAMIN C) 1000 MG tablet    Azelastine HCl 137 MCG/SPRAY SOLN    Calcium Carbonate-Vitamin D (Calcium 500 + D) 500-125 MG-UNIT TABS    desvenlafaxine succinate (PRISTIQ) 50 mg 24 hr tablet    loratadine-pseudoephedrine (CLARITIN-D 24-HOUR)  mg per 24 hr tablet    Multiple Vitamins-Minerals (MULTIVITAMIN WOMEN 50+ PO)    pantoprazole (PROTONIX) 40 mg tablet    Turmeric Curcumin 500 MG CAPS    ursodiol (ACTIGALL) 500 MG tablet    zolpidem (AMBIEN) 10 mg tablet    zinc sulfate (ZINCATE) 220 mg capsule  Allergies   Allergen Reactions    Aspirin GI Intolerance     Reviewed medications and allergies and updated as indicated    PHYSICAL EXAM:    Blood pressure 148/80, height 5' 7" (1 702 m), weight 91 3 kg (201 lb 3 2 oz)  Body mass index is 31 51 kg/m²  General Appearance: NAD, cooperative, alert  Eyes: Anicteric, PERRLA, EOMI  ENT:  Normocephalic, atraumatic, normal mucosa  Neck:  Supple, symmetrical, trachea midline  Resp:  Clear to auscultation bilaterally; no rales, rhonchi or wheezing; respirations unlabored   CV:  S1 S2, Regular rate and rhythm; no murmur, rub, or gallop  GI:  Soft, non-tender, non-distended; normal bowel sounds; no masses, no organomegaly   Rectal: Deferred  Musculoskeletal: No cyanosis, clubbing or edema  Normal ROM    Skin:  No jaundice, rashes, or lesions   Heme/Lymph: No palpable cervical lymphadenopathy  Psych: Normal affect, good eye contact  Neuro: No gross deficits, AAOx3    Lab Results:   Lab Results   Component Value Date    WBC 6 00 06/03/2022    HGB 13 3 06/03/2022    HCT 39 4 06/03/2022    MCV 92 06/03/2022     06/03/2022     Lab Results   Component Value Date    K 4 1 06/03/2022     06/03/2022    CO2 28 06/03/2022    BUN 14 06/03/2022    CREATININE 0 77 06/03/2022    GLUF 103 (H) 09/09/2021    CALCIUM 9 6 06/03/2022    CORRECTEDCA 9 9 05/07/2021    AST 16 06/03/2022    ALT 24 06/03/2022    ALKPHOS 120 (H) 06/03/2022    EGFR 80 06/03/2022     Lab Results   Component Value Date    FERRITIN 223 05/06/2021     INR: 1 01      Radiology Results:   No results found

## 2022-06-24 NOTE — PATIENT INSTRUCTIONS
Continue Protonix and Actigall twice a day  EGD now for Morales's surveillance  It recheck liver function tests in 6 months to make sure alkaline phosphatase isn't going up further  Recheck all liver blood work in 1 year    Follow-up office visit 1 year

## 2022-06-24 NOTE — TELEPHONE ENCOUNTER
Scheduled date of EGD(as of today):9/12/22  Physician performing EGD:Dr Cydney Altamirano  Location of EGD:[facility]  Instructions reviewed with patient by:Shweta BLOUNT  Clearances: N

## 2022-06-24 NOTE — PROGRESS NOTES
[id number] [facility] - Outpatient Follow-up Note  Sarah Nicole 78 y o  female MRN: [id number]  Encounter: [id number]    ASSESSMENT AND PLAN:      1  Morales's esophagus without dysplasia  Last EGD June 2019   - EGD at [facility], [place]    2  Gastroesophageal reflux disease without esophagitis  Doing well on Protonix 40 milligrams twice a day  - continue current regimen    3  Primary biliary cirrhosis (HCC)  On Actigall  Blood work with minimally increased alkaline phosphatase  - Hepatic function panel; Future (6 months)  - repeat CMP/CBC/INR in 1 year    4  Personal history of colon polyps  Last colonoscopy June 2019  Due for follow-up 2024      Followup Appointment:  1 year  ______________________________________________________________________    Chief Complaint   Patient presents with    Follow-up     GERD       HPI:  The patient is seen back in the office today  From a GERD standpoint she is doing well  When I last saw her we increased her Protonix to twice a day which is controlling all her breakthrough symptoms  She denies any dysphagia, odynophagia, early satiety  She denies any nausea or vomiting  Her weight is stable  She denies any GI bleeding  Her bowels are normal   From a liver standpoint she denies any ascites or lower extremity edema  She denies any pruritus  She denies any jaundice    She continues take Actigall for PBC    Historical Information   Past Medical History:   Diagnosis Date    Anxiety     Morales esophagus     Breast cancer ([facility] ) 09/24/2020    right mucinous Ca    Colon polyp     Gastroesophageal reflux disease without esophagitis 5/14/2019    GERD (gastroesophageal reflux disease)     History of gallstones     Hypertension     Personal history of colonic polyps 5/14/2019    Primary biliary cirrhosis ([facility] ) 5/14/2019    Seasonal allergies      Past Surgical History:   Procedure Laterality Date    APPENDECTOMY      BREAST BIOPSY Right 09/24/2020    stereo-  ucinous ca    BREAST LUMPECTOMY Right 12/22/2020    Procedure: BREAST NEEDLE LOCALIZED LUMPECTOMY (NEEDLE LOC AT 1230); Surgeon: Reyna Loza MD;  [facility];  Service: Surgical Oncology    COLONOSCOPY  06/24/2020     2 adenomas  Five year recall advised    INTRAOPERATIVE RADIATION THERAPY (IORT) Right 12/22/2020    Procedure: BREAST INTRAOPERATIVE RADIATION THERAPY (IORT) BY DR Cari Robbins;  Surgeon: Reyna Loza MD;  [facility];  Service: Surgical Oncology    LYMPH NODE BIOPSY Right 12/22/2020    Procedure: SENTINEL LYMPH NODE BIOPSY; LYMPHATIC MAPPING WITH BLUE DYE AND RADIOACTIVE DYE (INJECT AT 1400 BY DR KNOX IN THE OR); Surgeon: Reyna Loza MD;  [facility];  Service: Surgical Oncology    MAMMO NEEDLE LOCALIZATION RIGHT (ALL INC) Right 12/22/2020    MAMMO STEREOTACTIC BREAST BIOPSY RIGHT (ALL INC) Right 9/24/2020    TUBAL LIGATION      UPPER GASTROINTESTINAL ENDOSCOPY  06/24/2019    irregular z-line    biopsies negative for Morales's     Social History     Substance and Sexual Activity   Alcohol Use Yes     Social History     Substance and Sexual Activity   Drug Use Never     Social History     Tobacco Use   Smoking Status Never Smoker   Smokeless Tobacco Never Used     Family History   Problem Relation Age of Onset    No Known Problems Mother     Lung cancer Father 48    No Known Problems Sister     No Known Problems Sister     No Known Problems Brother     No Known Problems Maternal Grandmother     No Known Problems Maternal Grandfather     No Known Problems Paternal Grandmother     No Known Problems Paternal Grandfather     No Known Problems Maternal Aunt     No Known Problems Paternal Aunt     No Known Problems Paternal Aunt     No Known Problems Daughter     Colon cancer Neg Hx     Colon polyps Neg Hx          Current Outpatient Medications:     albuterol (PROVENTIL HFA,VENTOLIN HFA) 90 mcg/act inhaler    anastrozole (ARIMIDEX) 1 mg tablet    ascorbic acid (VITAMIN C) 1000 MG tablet    Azelastine HCl 137 MCG/SPRAY SOLN    Calcium Carbonate-Vitamin D (Calcium 500 + D) 500-125 MG-UNIT TABS    desvenlafaxine succinate (PRISTIQ) 50 mg 24 hr tablet    loratadine-pseudoephedrine (CLARITIN-D 24-HOUR)  mg per 24 hr tablet    Multiple Vitamins-Minerals (MULTIVITAMIN WOMEN 50+ PO)    pantoprazole (PROTONIX) 40 mg tablet    Turmeric Curcumin 500 MG CAPS    ursodiol (ACTIGALL) 500 MG tablet    zolpidem (AMBIEN) 10 mg tablet    zinc sulfate (ZINCATE) 220 mg capsule  Allergies   Allergen Reactions    Aspirin GI Intolerance     Reviewed medications and allergies and updated as indicated    PHYSICAL EXAM:    Blood pressure 148/80, height 5' 7" (1 702 m), weight 91 3 kg (201 lb 3 2 oz)  Body mass index is 31 51 kg/m²  General Appearance: NAD, cooperative, alert  Eyes: Anicteric, PERRLA, EOMI  ENT:  Normocephalic, atraumatic, normal mucosa  Neck:  Supple, symmetrical, trachea midline  Resp:  Clear to auscultation bilaterally; no rales, rhonchi or wheezing; respirations unlabored   CV:  S1 S2, Regular rate and rhythm; no murmur, rub, or gallop  GI:  Soft, non-tender, non-distended; normal bowel sounds; no masses, no organomegaly   Rectal: Deferred  Musculoskeletal: No cyanosis, clubbing or edema  Normal ROM    Skin:  No jaundice, rashes, or lesions   Heme/Lymph: No palpable cervical lymphadenopathy  Psych: Normal affect, good eye contact  Neuro: No gross deficits, AAOx3    Lab Results:   Lab Results   Component Value Date    WBC 6 00 06/03/2022    HGB 13 3 06/03/2022    HCT 39 4 06/03/2022    MCV 92 06/03/2022     06/03/2022     Lab Results   Component Value Date    K 4 1 06/03/2022     06/03/2022    CO2 28 06/03/2022    BUN 14 06/03/2022    CREATININE 0 77 06/03/2022    GLUF 103 (H) 09/09/2021    CALCIUM 9 6 06/03/2022    CORRECTEDCA 9 9 05/07/2021    AST 16 06/03/2022    ALT 24 06/03/2022    ALKPHOS 120 (H) 06/03/2022    EGFR 80 06/03/2022     Lab Results   Component Value Date    FERRITIN 223 05/06/2021     INR: 1 01      Radiology Results:   No results found

## 2022-07-29 ENCOUNTER — TELEPHONE (OUTPATIENT)
Dept: HEMATOLOGY ONCOLOGY | Facility: CLINIC | Age: 67
End: 2022-07-29

## 2022-07-29 NOTE — TELEPHONE ENCOUNTER
Left voice message and sent [employer] message notifying patient of Dr Randall Schmid reschedule   Gave HOPE# to call for questions/rescheduling

## 2022-08-18 ENCOUNTER — HOSPITAL ENCOUNTER (OUTPATIENT)
Dept: MAMMOGRAPHY | Facility: CLINIC | Age: 67
Discharge: HOME | End: 2022-08-18
Payer: COMMERCIAL

## 2022-08-18 VITALS — WEIGHT: 200.62 LBS | HEIGHT: 67 IN | BODY MASS INDEX: 31.49 KG/M2

## 2022-08-18 DIAGNOSIS — C50.411 MALIGNANT NEOPLASM OF UPPER-OUTER QUADRANT OF RIGHT BREAST IN FEMALE, ESTROGEN RECEPTOR POSITIVE (HCC): ICD-10-CM

## 2022-08-18 DIAGNOSIS — Z17.0 MALIGNANT NEOPLASM OF UPPER-OUTER QUADRANT OF RIGHT BREAST IN FEMALE, ESTROGEN RECEPTOR POSITIVE (HCC): ICD-10-CM

## 2022-08-18 PROCEDURE — 77066 DX MAMMO INCL CAD BI: CPT

## 2022-08-18 PROCEDURE — G0279 TOMOSYNTHESIS, MAMMO: HCPCS

## 2022-09-02 ENCOUNTER — APPOINTMENT (OUTPATIENT)
Dept: LAB | Facility: HOSPITAL | Age: 67
End: 2022-09-02
Payer: MEDICARE

## 2022-09-02 DIAGNOSIS — R53.83 OTHER FATIGUE: ICD-10-CM

## 2022-09-02 DIAGNOSIS — Z00.00 ROUTINE GENERAL MEDICAL EXAMINATION AT A HEALTH CARE FACILITY: ICD-10-CM

## 2022-09-02 LAB
ALBUMIN SERPL BCP-MCNC: 3.6 G/DL (ref 3.5–5)
ALP SERPL-CCNC: 129 U/L (ref 46–116)
ALT SERPL W P-5'-P-CCNC: 29 U/L (ref 12–78)
ANION GAP SERPL CALCULATED.3IONS-SCNC: 6 MMOL/L (ref 4–13)
AST SERPL W P-5'-P-CCNC: 15 U/L (ref 5–45)
BASOPHILS # BLD AUTO: 0.07 THOUSANDS/ΜL (ref 0–0.1)
BASOPHILS NFR BLD AUTO: 1 % (ref 0–1)
BILIRUB SERPL-MCNC: 0.72 MG/DL (ref 0.2–1)
BUN SERPL-MCNC: 10 MG/DL (ref 5–25)
CALCIUM SERPL-MCNC: 9.6 MG/DL (ref 8.3–10.1)
CHLORIDE SERPL-SCNC: 105 MMOL/L (ref 96–108)
CHOLEST SERPL-MCNC: 177 MG/DL
CO2 SERPL-SCNC: 26 MMOL/L (ref 21–32)
CREAT SERPL-MCNC: 0.79 MG/DL (ref 0.6–1.3)
EOSINOPHIL # BLD AUTO: 0.13 THOUSAND/ΜL (ref 0–0.61)
EOSINOPHIL NFR BLD AUTO: 2 % (ref 0–6)
ERYTHROCYTE [DISTWIDTH] IN BLOOD BY AUTOMATED COUNT: 12.8 % (ref 11.6–15.1)
GFR SERPL CREATININE-BSD FRML MDRD: 77 ML/MIN/1.73SQ M
GLUCOSE P FAST SERPL-MCNC: 114 MG/DL (ref 65–99)
HCT VFR BLD AUTO: 40.7 % (ref 34.8–46.1)
HDLC SERPL-MCNC: 76 MG/DL
HGB BLD-MCNC: 13.5 G/DL (ref 11.5–15.4)
IMM GRANULOCYTES # BLD AUTO: 0.05 THOUSAND/UL (ref 0–0.2)
IMM GRANULOCYTES NFR BLD AUTO: 1 % (ref 0–2)
LDLC SERPL CALC-MCNC: 84 MG/DL (ref 0–100)
LYMPHOCYTES # BLD AUTO: 2.25 THOUSANDS/ΜL (ref 0.6–4.47)
LYMPHOCYTES NFR BLD AUTO: 28 % (ref 14–44)
MCH RBC QN AUTO: 31.7 PG (ref 26.8–34.3)
MCHC RBC AUTO-ENTMCNC: 33.2 G/DL (ref 31.4–37.4)
MCV RBC AUTO: 96 FL (ref 82–98)
MONOCYTES # BLD AUTO: 0.64 THOUSAND/ΜL (ref 0.17–1.22)
MONOCYTES NFR BLD AUTO: 8 % (ref 4–12)
NEUTROPHILS # BLD AUTO: 4.81 THOUSANDS/ΜL (ref 1.85–7.62)
NEUTS SEG NFR BLD AUTO: 60 % (ref 43–75)
NONHDLC SERPL-MCNC: 101 MG/DL
NRBC BLD AUTO-RTO: 0 /100 WBCS
PLATELET # BLD AUTO: 332 THOUSANDS/UL (ref 149–390)
PMV BLD AUTO: 11.1 FL (ref 8.9–12.7)
POTASSIUM SERPL-SCNC: 4.4 MMOL/L (ref 3.5–5.3)
PROT SERPL-MCNC: 8.2 G/DL (ref 6.4–8.4)
RBC # BLD AUTO: 4.26 MILLION/UL (ref 3.81–5.12)
SODIUM SERPL-SCNC: 137 MMOL/L (ref 135–147)
TRIGL SERPL-MCNC: 86 MG/DL
WBC # BLD AUTO: 7.95 THOUSAND/UL (ref 4.31–10.16)

## 2022-09-02 PROCEDURE — 36415 COLL VENOUS BLD VENIPUNCTURE: CPT

## 2022-09-02 PROCEDURE — 80061 LIPID PANEL: CPT

## 2022-09-02 PROCEDURE — 80053 COMPREHEN METABOLIC PANEL: CPT

## 2022-09-02 PROCEDURE — 85025 COMPLETE CBC W/AUTO DIFF WBC: CPT

## 2022-09-12 ENCOUNTER — ANESTHESIA (OUTPATIENT)
Dept: GASTROENTEROLOGY | Facility: AMBULATORY SURGERY CENTER | Age: 67
End: 2022-09-12

## 2022-09-12 ENCOUNTER — HOSPITAL ENCOUNTER (OUTPATIENT)
Dept: GASTROENTEROLOGY | Facility: AMBULATORY SURGERY CENTER | Age: 67
Discharge: HOME | End: 2022-09-12
Payer: COMMERCIAL

## 2022-09-12 ENCOUNTER — ANESTHESIA EVENT (OUTPATIENT)
Dept: GASTROENTEROLOGY | Facility: AMBULATORY SURGERY CENTER | Age: 67
End: 2022-09-12

## 2022-09-12 VITALS
BODY MASS INDEX: 30.61 KG/M2 | SYSTOLIC BLOOD PRESSURE: 150 MMHG | RESPIRATION RATE: 20 BRPM | HEIGHT: 67 IN | WEIGHT: 195 LBS | HEART RATE: 86 BPM | OXYGEN SATURATION: 97 % | TEMPERATURE: 98.2 F | DIASTOLIC BLOOD PRESSURE: 94 MMHG

## 2022-09-12 DIAGNOSIS — K22.70 BARRETT'S ESOPHAGUS WITHOUT DYSPLASIA: ICD-10-CM

## 2022-09-12 PROBLEM — F41.9 ANXIETY: Status: ACTIVE | Noted: 2022-09-12

## 2022-09-12 PROBLEM — F32.A DEPRESSION: Status: ACTIVE | Noted: 2022-09-12

## 2022-09-12 PROCEDURE — 88305 TISSUE EXAM BY PATHOLOGIST: CPT | Performed by: PATHOLOGY

## 2022-09-12 PROCEDURE — 43239 EGD BIOPSY SINGLE/MULTIPLE: CPT | Performed by: INTERNAL MEDICINE

## 2022-09-12 RX ORDER — PROPOFOL 10 MG/ML
INJECTION, EMULSION INTRAVENOUS AS NEEDED
Status: DISCONTINUED | OUTPATIENT
Start: 2022-09-12 | End: 2022-09-12

## 2022-09-12 RX ORDER — LIDOCAINE HYDROCHLORIDE 20 MG/ML
INJECTION, SOLUTION EPIDURAL; INFILTRATION; INTRACAUDAL; PERINEURAL AS NEEDED
Status: DISCONTINUED | OUTPATIENT
Start: 2022-09-12 | End: 2022-09-12

## 2022-09-12 RX ORDER — SODIUM CHLORIDE, SODIUM LACTATE, POTASSIUM CHLORIDE, CALCIUM CHLORIDE 600; 310; 30; 20 MG/100ML; MG/100ML; MG/100ML; MG/100ML
INJECTION, SOLUTION INTRAVENOUS CONTINUOUS PRN
Status: DISCONTINUED | OUTPATIENT
Start: 2022-09-12 | End: 2022-09-12

## 2022-09-12 RX ORDER — SERTRALINE HYDROCHLORIDE 100 MG/1
100 TABLET, FILM COATED ORAL DAILY
COMMUNITY

## 2022-09-12 RX ORDER — SODIUM CHLORIDE, SODIUM LACTATE, POTASSIUM CHLORIDE, CALCIUM CHLORIDE 600; 310; 30; 20 MG/100ML; MG/100ML; MG/100ML; MG/100ML
50 INJECTION, SOLUTION INTRAVENOUS CONTINUOUS
Status: DISCONTINUED | OUTPATIENT
Start: 2022-09-12 | End: 2022-09-16 | Stop reason: HOSPADM

## 2022-09-12 RX ADMIN — SODIUM CHLORIDE, SODIUM LACTATE, POTASSIUM CHLORIDE, CALCIUM CHLORIDE: 600; 310; 30; 20 INJECTION, SOLUTION INTRAVENOUS at 09:32

## 2022-09-12 RX ADMIN — PROPOFOL 100 MG: 10 INJECTION, EMULSION INTRAVENOUS at 09:45

## 2022-09-12 RX ADMIN — PROPOFOL 50 MG: 10 INJECTION, EMULSION INTRAVENOUS at 09:49

## 2022-09-12 RX ADMIN — PROPOFOL 150 MG: 10 INJECTION, EMULSION INTRAVENOUS at 09:42

## 2022-09-12 RX ADMIN — LIDOCAINE HYDROCHLORIDE 100 MG: 20 INJECTION, SOLUTION EPIDURAL; INFILTRATION; INTRACAUDAL; PERINEURAL at 09:42

## 2022-09-12 RX ADMIN — SODIUM CHLORIDE, SODIUM LACTATE, POTASSIUM CHLORIDE, CALCIUM CHLORIDE 50 ML/HR: 600; 310; 30; 20 INJECTION, SOLUTION INTRAVENOUS at 09:29

## 2022-09-12 NOTE — H&P
History and Physical - [facility]  Wilmer Pulliam 75 y o  female MRN: [id number]    HPI: Wilmer Pulliam is a 75y o  year old female who presents for EGD secondary to Barretts    REVIEW OF SYSTEMS: Per the HPI, and otherwise unremarkable  Historical Information   Past Medical History:   Diagnosis Date    Anxiety     Morales esophagus     Breast cancer ([facility] ) 09/24/2020    right mucinous Ca    Colon polyp     Gastroesophageal reflux disease without esophagitis 5/14/2019    GERD (gastroesophageal reflux disease)     History of gallstones     Hypertension     Personal history of colonic polyps 5/14/2019    Primary biliary cirrhosis ([facility] ) 5/14/2019    Seasonal allergies      Past Surgical History:   Procedure Laterality Date    APPENDECTOMY      BREAST BIOPSY Right 09/24/2020    stereo-  mucinous ca    BREAST BIOPSY Left 10/06/2020    us bx- neg    BREAST LUMPECTOMY Right 12/22/2020    Procedure: BREAST NEEDLE LOCALIZED LUMPECTOMY (NEEDLE LOC AT 1230); Surgeon: Jazmin Patrick MD;  [facility];  Service: Surgical Oncology    COLONOSCOPY  06/24/2020     2 adenomas  Five year recall advised    INTRAOPERATIVE RADIATION THERAPY (IORT) Right 12/22/2020    Procedure: BREAST INTRAOPERATIVE RADIATION THERAPY (IORT) BY DR Trupti Wills;  Surgeon: Jazmin Patrick MD;  [facility];  Service: Surgical Oncology    LYMPH NODE BIOPSY Right 12/22/2020    Procedure: SENTINEL LYMPH NODE BIOPSY; LYMPHATIC MAPPING WITH BLUE DYE AND RADIOACTIVE DYE (INJECT AT 1400 BY DR KNOX IN THE OR); Surgeon: Jazmin Patrick MD;  [facility];  Service: Surgical Oncology    MAMMO NEEDLE LOCALIZATION RIGHT (ALL INC) Right 12/22/2020    MAMMO STEREOTACTIC BREAST BIOPSY RIGHT (ALL INC) Right 09/24/2020    TUBAL LIGATION      UPPER GASTROINTESTINAL ENDOSCOPY  06/24/2019    irregular z-line    biopsies negative for Morales's     Social History   Social History     Substance and Sexual Activity   Alcohol Use Yes     Social History     Substance and Sexual Activity   Drug Use Never     Social History     Tobacco Use   Smoking Status Never Smoker   Smokeless Tobacco Never Used     Family History   Problem Relation Age of Onset    No Known Problems Mother     Lung cancer Father 48    No Known Problems Sister     No Known Problems Sister     No Known Problems Brother     No Known Problems Maternal Grandmother     No Known Problems Maternal Grandfather     No Known Problems Paternal Grandmother     No Known Problems Paternal Grandfather     No Known Problems Maternal Aunt     No Known Problems Paternal Aunt     No Known Problems Paternal Aunt     No Known Problems Daughter     Colon cancer Neg Hx     Colon polyps Neg Hx        Meds/Allergies       Current Outpatient Medications:     anastrozole (ARIMIDEX) 1 mg tablet    Azelastine HCl 137 MCG/SPRAY SOLN    Calcium Carbonate-Vitamin D (Calcium 500 + D) 500-125 MG-UNIT TABS    loratadine-pseudoephedrine (CLARITIN-D 24-HOUR)  mg per 24 hr tablet    Multiple Vitamins-Minerals (MULTIVITAMIN WOMEN 50+ PO)    pantoprazole (PROTONIX) 40 mg tablet    sertraline (ZOLOFT) 100 mg tablet    Turmeric Curcumin 500 MG CAPS    ursodiol (ACTIGALL) 500 MG tablet    zolpidem (AMBIEN) 10 mg tablet    albuterol (PROVENTIL HFA,VENTOLIN HFA) 90 mcg/act inhaler    ascorbic acid (VITAMIN C) 1000 MG tablet    desvenlafaxine succinate (PRISTIQ) 50 mg 24 hr tablet    zinc sulfate (ZINCATE) 220 mg capsule    Current Facility-Administered Medications:     lactated ringers infusion, 50 mL/hr, Intravenous, Continuous, 50 mL/hr at 09/12/22 5308    Facility-Administered Medications Ordered in Other Encounters:     lactated ringers infusion, , Intravenous, Continuous PRN, New Bag at 09/12/22 0932    Allergies   Allergen Reactions    Aspirin GI Intolerance       Objective     /75   Pulse 90   Temp 98 2 °F (36 8 °C) (Temporal)   Resp 19   Ht 5' 7" (1 702 m) Wt 88 5 kg (195 lb)   SpO2 98%   BMI 30 54 kg/m²     PHYSICAL EXAM    Gen: NAD AAOx3  Head: Normocephalic, Atraumatic  CV: S1S2 RRR no m/r/g  CHEST: Clear b/l no c/r/w  ABD: soft, +BS NT/ND  EXT: no edema    ASSESSMENT/PLAN:  This is a 75y o  year old female here for EGD secondary to Barretts, and she is stable and optimized for her procedure

## 2022-09-12 NOTE — ANESTHESIA POSTPROCEDURE EVALUATION
Post-Op Assessment Note    CV Status:  Stable  Pain Score: 0    Pain management: adequate     Mental Status:  Arousable and sleepy   Hydration Status:  Stable   PONV Controlled:  Controlled   Airway Patency:  Patent      Post Op Vitals Reviewed: Yes      Staff: CRNA         No complications documented      BP   160/91   Temp 97 6   Pulse 81   Resp 14   SpO2 99

## 2022-09-12 NOTE — ANESTHESIA PREPROCEDURE EVALUATION
Procedure:  EGD    Relevant Problems   ANESTHESIA (within normal limits)   (-) History of anesthesia complications      CARDIO   (-) Chest pain   (-) BOX (dyspnea on exertion)      GI/HEPATIC   (+) Gastroesophageal reflux disease without esophagitis   (+) Primary biliary cirrhosis (HCC)      GYN   (+) Malignant neoplasm of upper-outer quadrant of right breast in female, estrogen receptor positive (HCC)      NEURO/PSYCH   (+) Anxiety   (+) Depression      PULMONARY   (-) Shortness of breath   (-) URI (upper respiratory infection)        Physical Exam    Airway    Mallampati score: II  TM Distance: >3 FB  Neck ROM: full     Dental       Cardiovascular      Pulmonary      Other Findings        Anesthesia Plan  ASA Score- 2     Anesthesia Type- IV sedation with anesthesia with ASA Monitors  Additional Monitors:   Airway Plan:           Plan Factors-Exercise tolerance (METS): >4 METS  Chart reviewed  EKG reviewed  Patient summary reviewed  Induction- intravenous  Postoperative Plan-     Informed Consent- Anesthetic plan and risks discussed with patient  I personally reviewed this patient with the CRNA  Discussed and agreed on the Anesthesia Plan with the CRNA  Jacinto Brink

## 2022-09-13 ENCOUNTER — TELEPHONE (OUTPATIENT)
Dept: HEMATOLOGY ONCOLOGY | Facility: CLINIC | Age: 67
End: 2022-09-13

## 2022-09-13 NOTE — TELEPHONE ENCOUNTER
LM for patient that appt  On 10/20/22 w/ Sera Saldivar will need to be R/S due to her not being at the [facility] on Thursdays any longer   Asked patient to please call back to R/S

## 2022-10-04 ENCOUNTER — OFFICE VISIT (OUTPATIENT)
Dept: HEMATOLOGY ONCOLOGY | Facility: CLINIC | Age: 67
End: 2022-10-04
Payer: COMMERCIAL

## 2022-10-04 VITALS
TEMPERATURE: 97.4 F | OXYGEN SATURATION: 98 % | DIASTOLIC BLOOD PRESSURE: 78 MMHG | HEIGHT: 67 IN | WEIGHT: 202 LBS | SYSTOLIC BLOOD PRESSURE: 124 MMHG | RESPIRATION RATE: 18 BRPM | HEART RATE: 104 BPM | BODY MASS INDEX: 31.71 KG/M2

## 2022-10-04 DIAGNOSIS — Z17.0 MALIGNANT NEOPLASM OF UPPER-OUTER QUADRANT OF RIGHT BREAST IN FEMALE, ESTROGEN RECEPTOR POSITIVE (HCC): Primary | ICD-10-CM

## 2022-10-04 DIAGNOSIS — C50.411 MALIGNANT NEOPLASM OF UPPER-OUTER QUADRANT OF RIGHT BREAST IN FEMALE, ESTROGEN RECEPTOR POSITIVE (HCC): Primary | ICD-10-CM

## 2022-10-04 PROCEDURE — 99214 OFFICE O/P EST MOD 30 MIN: CPT | Performed by: INTERNAL MEDICINE

## 2022-10-04 RX ORDER — ANASTROZOLE 1 MG/1
1 TABLET ORAL DAILY
Qty: 90 TABLET | Refills: 3 | Status: SHIPPED | OUTPATIENT
Start: 2022-10-04

## 2022-10-04 NOTE — PROGRESS NOTES
Hematology / Oncology Outpatient Follow Up Note    Wilmer Pulliam 75 y o  female AJG:[birth year] [facility]:[id number]         Date:  10/4/2022    Assessment / Plan:  A 68-year-old postmenopausal woman with stage I A right breast cancer, grade 1 with mucinous histology, % positive, OK 25% positive, HER2 negative disease   She underwent lumpectomy and sentinel lymph node biopsy, resulting in TAMARA  Since January 2021, she has been on adjuvant hormonal therapy with anastrozole with excellent tolerance  She has no evidence recurrent disease, based on her symptoms and physical examinations  I recommended her to continue anastrozole for 3 more years  She is in agreement with my recommendations  I will see her again in a year for routine follow-up         Subjective:      HPI:  A 76years old postmenopausal woman who was found to have radiographic abnormality in her right breast for which she underwent biopsy in September 24, 2020   She had invasive mucinous carcinoma, grade 1   This was % positive, OK 25% positive, HER2 negative disease   Subsequently, she underwent lumpectomy and sentinel lymph node biopsy by Dr Guido Ramesh in December 22, 2020 which showed 2 mm and 1 1 mm of mucinous carcinoma, grade 1   There was no evidence of lymphovascular invasion   1 sentinel lymph node was negative for metastatic disease   She presents today with her  to discuss adjuvant treatment options   She feels well with no pain   She has no respiratory symptoms   Her weight is stable   She has hypertension as well as primary biliary cirrhosis for which she is on ursodil   Her liver function test is normal   She has no family history of breast cancer  Belia Dillon is a lifetime never smoker   Her performance status is normal         Interval History:   A 68-year-old postmenopausal woman with stage I A right breast cancer, grade 1 with mucinous histology, % positive, OK 25% positive, HER2 negative disease   She underwent lumpectomy and sentinel lymph node biopsy, resulting in TAMARA  Since February 2021, she has been on adjuvant hormonal therapy with anastrozole  She presents today for routine follow-up  She feels well with no new complaint  She has stable hair thinning  She denied hot flashes or musculoskeletal symptoms she has no complaint of pain  Her weight is stable  Her performance status is normal           Objective:      Primary Diagnosis:     Right breast cancer, stage I A (pT1a, pN0, M0) grade 1, mucinous histology, % positive, IA 25% positive, HER2 negative disease   Diagnosed in December 2020       Cancer Staging:  Cancer Staging  No matching staging information was found for the patient         Previous Hematologic/ Oncologic Treatment:            Current Hematologic/ Oncologic Treatment:       Adjuvant hormonal therapy with anastrozole since January 2021       Disease Status:      TAMARA status post lumpectomy and sentinel lymph node biopsy      Test Results:     Pathology:     2 mm and 1 1 mm of invasive mucinous carcinoma, grade 1  No evidence of lymphovascular invasion   1 sentinel lymph node was negative for metastatic disease   % positive, IA 25% positive, HER2 negative disease   Stage I A (pT1a, pN0, M0)     Radiology:     Mammography in August 2022 was benign  Bi rad 2      Laboratory:     See below      Physical Exam:        General Appearance:    Alert, oriented          Eyes:    PERRL   Ears:    Normal external ear canals, both ears   Nose:   Nares normal, septum midline   Throat:   Mucosa moist  Pharynx without injection  Neck:   Supple         Lungs:     Clear to auscultation bilaterally   Chest Wall:    No tenderness or deformity    Heart:    Regular rate and rhythm         Abdomen:     Soft, non-tender, bowel sounds +, no organomegaly               Extremities:   Extremities no cyanosis or edema         Skin:   no rash or icterus      Lymph nodes:   Cervical, supraclavicular, and axillary nodes normal   Neurologic:   CNII-XII intact, normal strength, sensation and reflexes     Throughout             Breast exam:   Lumpectomy scar at outer upper quadrant of her right breast with no palpable abnormality   Left breast exam is negative               ROS: Review of Systems   All other systems reviewed and are negative  Imaging: EGD    Result Date: 9/12/2022  Narrative: [address] [phone] DATE OF SERVICE: 9/12/22 PHYSICIAN(S): Attending: Te Judd MD Fellow: No Staff Documented INDICATION: Morales's esophagus without dysplasia POST-OP DIAGNOSIS: See the impression below  PREPROCEDURE: Informed consent was obtained for the procedure, including sedation  Risks of perforation, hemorrhage, adverse drug reaction and aspiration were discussed  The patient was placed in the left lateral decubitus position  Patient was explained about the risks and benefits of the procedure  Risks including but not limited to bleeding, infection, and perforation were explained in detail  Also explained about less than 100% sensitivity with the exam and other alternatives  DETAILS OF PROCEDURE: Patient was taken to the procedure room where a time out was performed to confirm correct patient and correct procedure  The patient underwent monitored anesthesia care, which was administered by an anesthesia professional  The patient's blood pressure, heart rate, level of consciousness, respirations and oxygen were monitored throughout the procedure  The scope was advanced to the second part of the duodenum  Retroflexion was performed in the fundus  The patient experienced no blood loss  The procedure was not difficult  The patient tolerated the procedure well  There were no apparent complications   ANESTHESIA INFORMATION: ASA: II Anesthesia Type: IV Sedation with Anesthesia MEDICATIONS: No administrations occurring from 0938 to 0955 on 09/12/22 FINDINGS: The antrum and duodenum appeared normal  Small hiatal hernia Morales's esophagus with no associated nodule; narrow band imaging (NBI) used; performed cold forceps biopsy  WATS 3D Brushng SPECIMENS: ID Type Source Tests Collected by Time Destination 1 : EGJ Biopsy Hx OF Barretts Tissue Esophagus TISSUE EXAM Catina Carmona MD 9/12/2022  9:51 AM      Impression: Short-segment Morales's esophagus without evidence of inflammation, ulceration, or nodularity  Biopsies taken  WATS 3D brushings performed Small hiatal hernia but otherwise normal stomach Normal duodenum RECOMMENDATION: Resume regular diet and medications I will call biopsy results in 2 weeks Follow-up office visit 1 year Repeat EGD 3 years   Catina Carmona MD         Labs:   Lab Results   Component Value Date    WBC 7 95 09/02/2022    HGB 13 5 09/02/2022    HCT 40 7 09/02/2022    MCV 96 09/02/2022     09/02/2022     Lab Results   Component Value Date    K 4 4 09/02/2022     09/02/2022    CO2 26 09/02/2022    BUN 10 09/02/2022    CREATININE 0 79 09/02/2022    GLUF 114 (H) 09/02/2022    CALCIUM 9 6 09/02/2022    CORRECTEDCA 9 9 05/07/2021    AST 15 09/02/2022    ALT 29 09/02/2022    ALKPHOS 129 (H) 09/02/2022    EGFR 77 09/02/2022         Lab Results   Component Value Date    FERRITIN 223 05/06/2021         Current Medications: Reviewed  Allergies: Reviewed  PMH/FH/SH:  Reviewed      Vital Sign:    Body surface area is 2 03 meters squared      Wt Readings from Last 3 Encounters:   10/04/22 91 6 kg (202 lb)   09/12/22 88 5 kg (195 lb)   08/18/22 91 kg (200 lb 9 9 oz)        Temp Readings from Last 3 Encounters:   10/04/22 (!) 97 4 °F (36 3 °C) (Tympanic)   09/12/22 98 2 °F (36 8 °C) (Temporal)   04/20/22 97 8 °F (36 6 °C)        BP Readings from Last 3 Encounters:   10/04/22 124/78   09/12/22 150/94   06/24/22 148/80         Pulse Readings from Last 3 Encounters:   10/04/22 104   09/12/22 86   04/20/22 95 @WBDYWLC9(9)@

## 2022-10-13 ENCOUNTER — TELEPHONE (OUTPATIENT)
Dept: SURGICAL ONCOLOGY | Facility: CLINIC | Age: 67
End: 2022-10-13

## 2022-10-13 NOTE — TELEPHONE ENCOUNTER
I called the patient because she cancelled her appointment for Alverto Mattson  I left a message with the hopeline to reschedule

## 2022-10-25 DIAGNOSIS — K21.9 GASTROESOPHAGEAL REFLUX DISEASE WITHOUT ESOPHAGITIS: ICD-10-CM

## 2022-10-25 RX ORDER — PANTOPRAZOLE SODIUM 40 MG/1
TABLET, DELAYED RELEASE ORAL
Qty: 180 TABLET | Refills: 12 | Status: SHIPPED | OUTPATIENT
Start: 2022-10-25

## 2022-11-30 DIAGNOSIS — K74.3 PRIMARY BILIARY CIRRHOSIS (HCC): ICD-10-CM

## 2022-11-30 RX ORDER — URSODIOL 500 MG/1
TABLET, FILM COATED ORAL
Qty: 360 TABLET | Refills: 1 | Status: SHIPPED | OUTPATIENT
Start: 2022-11-30

## 2022-12-14 DIAGNOSIS — K74.3 PRIMARY BILIARY CIRRHOSIS (HCC): ICD-10-CM

## 2022-12-14 DIAGNOSIS — K21.9 GASTROESOPHAGEAL REFLUX DISEASE WITHOUT ESOPHAGITIS: ICD-10-CM

## 2022-12-14 RX ORDER — URSODIOL 500 MG/1
1000 TABLET, FILM COATED ORAL 2 TIMES DAILY
Qty: 360 TABLET | Refills: 1 | Status: SHIPPED | OUTPATIENT
Start: 2022-12-14

## 2022-12-14 RX ORDER — PANTOPRAZOLE SODIUM 40 MG/1
40 TABLET, DELAYED RELEASE ORAL 2 TIMES DAILY
Qty: 180 TABLET | Refills: 12 | Status: SHIPPED | OUTPATIENT
Start: 2022-12-14

## 2023-02-23 ENCOUNTER — APPOINTMENT (EMERGENCY)
Dept: RADIOLOGY | Facility: HOSPITAL | Age: 68
End: 2023-02-23

## 2023-02-23 ENCOUNTER — HOSPITAL ENCOUNTER (OUTPATIENT)
Facility: HOSPITAL | Age: 68
Setting detail: OBSERVATION
Discharge: HOME | End: 2023-02-25
Attending: EMERGENCY MEDICINE | Admitting: STUDENT IN AN ORGANIZED HEALTH CARE EDUCATION/TRAINING PROGRAM

## 2023-02-23 DIAGNOSIS — Z87.81 S/P ORIF (OPEN REDUCTION INTERNAL FIXATION) FRACTURE: ICD-10-CM

## 2023-02-23 DIAGNOSIS — S52.532B COLLES' FRACTURE OF LEFT RADIUS, INITIAL ENCOUNTER FOR OPEN FRACTURE TYPE I OR II: Primary | ICD-10-CM

## 2023-02-23 DIAGNOSIS — Z98.890 S/P ORIF (OPEN REDUCTION INTERNAL FIXATION) FRACTURE: ICD-10-CM

## 2023-02-23 DIAGNOSIS — S62.102B OPEN FRACTURE OF LEFT WRIST, INITIAL ENCOUNTER: Primary | ICD-10-CM

## 2023-02-23 PROBLEM — S52.602B FRACTURE OF DISTAL RADIUS AND ULNA, LEFT, OPEN TYPE I OR II, INITIAL ENCOUNTER: Status: ACTIVE | Noted: 2023-02-23

## 2023-02-23 PROBLEM — E87.6 HYPOKALEMIA: Status: ACTIVE | Noted: 2023-02-23

## 2023-02-23 PROBLEM — S52.502B FRACTURE OF DISTAL RADIUS AND ULNA, LEFT, OPEN TYPE I OR II, INITIAL ENCOUNTER: Status: ACTIVE | Noted: 2023-02-23

## 2023-02-23 LAB
ALBUMIN SERPL BCP-MCNC: 3.9 G/DL (ref 3.5–5)
ALP SERPL-CCNC: 85 U/L (ref 34–104)
ALT SERPL W P-5'-P-CCNC: 16 U/L (ref 7–52)
ANION GAP SERPL CALCULATED.3IONS-SCNC: 13 MMOL/L (ref 4–13)
APTT PPP: 23 SECONDS (ref 23–37)
AST SERPL W P-5'-P-CCNC: 18 U/L (ref 13–39)
BASOPHILS # BLD AUTO: 0.06 THOUSANDS/ÂΜL (ref 0–0.1)
BASOPHILS NFR BLD AUTO: 1 % (ref 0–1)
BILIRUB SERPL-MCNC: 0.51 MG/DL (ref 0.2–1)
BUN SERPL-MCNC: 12 MG/DL (ref 5–25)
CALCIUM SERPL-MCNC: 8.4 MG/DL (ref 8.4–10.2)
CHLORIDE SERPL-SCNC: 101 MMOL/L (ref 96–108)
CO2 SERPL-SCNC: 20 MMOL/L (ref 21–32)
CREAT SERPL-MCNC: 0.65 MG/DL (ref 0.6–1.3)
EOSINOPHIL # BLD AUTO: 0.11 THOUSAND/ÂΜL (ref 0–0.61)
EOSINOPHIL NFR BLD AUTO: 1 % (ref 0–6)
ERYTHROCYTE [DISTWIDTH] IN BLOOD BY AUTOMATED COUNT: 12.5 % (ref 11.6–15.1)
GFR SERPL CREATININE-BSD FRML MDRD: 91 ML/MIN/1.73SQ M
GLUCOSE SERPL-MCNC: 105 MG/DL (ref 65–140)
HCT VFR BLD AUTO: 37.8 % (ref 34.8–46.1)
HGB BLD-MCNC: 12.6 G/DL (ref 11.5–15.4)
IMM GRANULOCYTES # BLD AUTO: 0.04 THOUSAND/UL (ref 0–0.2)
IMM GRANULOCYTES NFR BLD AUTO: 0 % (ref 0–2)
INR PPP: 0.95 (ref 0.84–1.19)
LYMPHOCYTES # BLD AUTO: 3.67 THOUSANDS/ÂΜL (ref 0.6–4.47)
LYMPHOCYTES NFR BLD AUTO: 37 % (ref 14–44)
MAGNESIUM SERPL-MCNC: 1.7 MG/DL (ref 1.9–2.7)
MCH RBC QN AUTO: 30.9 PG (ref 26.8–34.3)
MCHC RBC AUTO-ENTMCNC: 33.3 G/DL (ref 31.4–37.4)
MCV RBC AUTO: 93 FL (ref 82–98)
MONOCYTES # BLD AUTO: 0.62 THOUSAND/ÂΜL (ref 0.17–1.22)
MONOCYTES NFR BLD AUTO: 6 % (ref 4–12)
NEUTROPHILS # BLD AUTO: 5.3 THOUSANDS/ÂΜL (ref 1.85–7.62)
NEUTS SEG NFR BLD AUTO: 55 % (ref 43–75)
NRBC BLD AUTO-RTO: 0 /100 WBCS
PLATELET # BLD AUTO: 313 THOUSANDS/UL (ref 149–390)
PMV BLD AUTO: 10.1 FL (ref 8.9–12.7)
POTASSIUM SERPL-SCNC: 3.2 MMOL/L (ref 3.5–5.3)
PROT SERPL-MCNC: 7.6 G/DL (ref 6.4–8.4)
PROTHROMBIN TIME: 13.4 SECONDS (ref 11.6–14.5)
RBC # BLD AUTO: 4.08 MILLION/UL (ref 3.81–5.12)
SODIUM SERPL-SCNC: 134 MMOL/L (ref 135–147)
WBC # BLD AUTO: 9.8 THOUSAND/UL (ref 4.31–10.16)

## 2023-02-23 RX ORDER — LANOLIN ALCOHOL/MO/W.PET/CERES
1 CREAM (GRAM) TOPICAL DAILY
Status: DISCONTINUED | OUTPATIENT
Start: 2023-02-24 | End: 2023-02-25 | Stop reason: HOSPADM

## 2023-02-23 RX ORDER — OXYCODONE HYDROCHLORIDE 5 MG/1
5 TABLET ORAL EVERY 6 HOURS PRN
Status: DISCONTINUED | OUTPATIENT
Start: 2023-02-23 | End: 2023-02-25 | Stop reason: HOSPADM

## 2023-02-23 RX ORDER — ANASTROZOLE 1 MG/1
1 TABLET ORAL DAILY
Status: DISCONTINUED | OUTPATIENT
Start: 2023-02-24 | End: 2023-02-25 | Stop reason: HOSPADM

## 2023-02-23 RX ORDER — ZOLPIDEM TARTRATE 5 MG/1
10 TABLET ORAL
Status: DISCONTINUED | OUTPATIENT
Start: 2023-02-24 | End: 2023-02-25 | Stop reason: HOSPADM

## 2023-02-23 RX ORDER — HYDROMORPHONE HCL/PF 1 MG/ML
1 SYRINGE (ML) INJECTION ONCE
Status: COMPLETED | OUTPATIENT
Start: 2023-02-23 | End: 2023-02-23

## 2023-02-23 RX ORDER — METHOCARBAMOL 500 MG/1
500 TABLET, FILM COATED ORAL EVERY 6 HOURS PRN
Status: DISCONTINUED | OUTPATIENT
Start: 2023-02-23 | End: 2023-02-25 | Stop reason: HOSPADM

## 2023-02-23 RX ORDER — SERTRALINE HYDROCHLORIDE 100 MG/1
100 TABLET, FILM COATED ORAL DAILY
Status: DISCONTINUED | OUTPATIENT
Start: 2023-02-24 | End: 2023-02-25 | Stop reason: HOSPADM

## 2023-02-23 RX ORDER — CEFAZOLIN SODIUM 2 G/50ML
2000 SOLUTION INTRAVENOUS ONCE
Status: COMPLETED | OUTPATIENT
Start: 2023-02-23 | End: 2023-02-23

## 2023-02-23 RX ORDER — ENOXAPARIN SODIUM 100 MG/ML
40 INJECTION SUBCUTANEOUS DAILY
Status: DISCONTINUED | OUTPATIENT
Start: 2023-02-24 | End: 2023-02-25 | Stop reason: HOSPADM

## 2023-02-23 RX ORDER — ONDANSETRON 2 MG/ML
4 INJECTION INTRAMUSCULAR; INTRAVENOUS EVERY 6 HOURS PRN
Status: DISCONTINUED | OUTPATIENT
Start: 2023-02-23 | End: 2023-02-25 | Stop reason: HOSPADM

## 2023-02-23 RX ORDER — SENNOSIDES 8.6 MG
1 TABLET ORAL
Status: DISCONTINUED | OUTPATIENT
Start: 2023-02-23 | End: 2023-02-25 | Stop reason: HOSPADM

## 2023-02-23 RX ORDER — ACETAMINOPHEN 325 MG/1
975 TABLET ORAL EVERY 8 HOURS
Status: DISCONTINUED | OUTPATIENT
Start: 2023-02-23 | End: 2023-02-25 | Stop reason: HOSPADM

## 2023-02-23 RX ORDER — POTASSIUM CHLORIDE 20 MEQ/1
40 TABLET, EXTENDED RELEASE ORAL ONCE
Status: COMPLETED | OUTPATIENT
Start: 2023-02-23 | End: 2023-02-23

## 2023-02-23 RX ORDER — CALCIUM CARBONATE 200(500)MG
1000 TABLET,CHEWABLE ORAL DAILY PRN
Status: DISCONTINUED | OUTPATIENT
Start: 2023-02-23 | End: 2023-02-25 | Stop reason: HOSPADM

## 2023-02-23 RX ORDER — PANTOPRAZOLE SODIUM 40 MG/1
40 TABLET, DELAYED RELEASE ORAL
Status: DISCONTINUED | OUTPATIENT
Start: 2023-02-24 | End: 2023-02-25 | Stop reason: HOSPADM

## 2023-02-23 RX ORDER — URSODIOL 300 MG/1
900 CAPSULE ORAL 2 TIMES DAILY
Status: DISCONTINUED | OUTPATIENT
Start: 2023-02-24 | End: 2023-02-25 | Stop reason: HOSPADM

## 2023-02-23 RX ORDER — DEXTROSE, SODIUM CHLORIDE, AND POTASSIUM CHLORIDE 5; .45; .15 G/100ML; G/100ML; G/100ML
100 INJECTION INTRAVENOUS CONTINUOUS
Status: DISCONTINUED | OUTPATIENT
Start: 2023-02-24 | End: 2023-02-24

## 2023-02-23 RX ORDER — FENTANYL CITRATE 50 UG/ML
1 INJECTION, SOLUTION INTRAMUSCULAR; INTRAVENOUS ONCE
Status: COMPLETED | OUTPATIENT
Start: 2023-02-23 | End: 2023-02-23

## 2023-02-23 RX ORDER — CEFAZOLIN SODIUM 2 G/50ML
2000 SOLUTION INTRAVENOUS EVERY 8 HOURS
Status: DISCONTINUED | OUTPATIENT
Start: 2023-02-24 | End: 2023-02-24

## 2023-02-23 RX ADMIN — HYDROMORPHONE HYDROCHLORIDE 1 MG: 1 INJECTION, SOLUTION INTRAMUSCULAR; INTRAVENOUS; SUBCUTANEOUS at 18:56

## 2023-02-23 RX ADMIN — SODIUM CHLORIDE 500 ML: 0.9 INJECTION, SOLUTION INTRAVENOUS at 18:55

## 2023-02-23 RX ADMIN — POTASSIUM CHLORIDE 40 MEQ: 1500 TABLET, EXTENDED RELEASE ORAL at 21:08

## 2023-02-23 RX ADMIN — TETANUS TOXOID, REDUCED DIPHTHERIA TOXOID AND ACELLULAR PERTUSSIS VACCINE, ADSORBED 0.5 ML: 5; 2.5; 8; 8; 2.5 SUSPENSION INTRAMUSCULAR at 21:09

## 2023-02-23 RX ADMIN — CEFAZOLIN SODIUM 2000 MG: 2 SOLUTION INTRAVENOUS at 18:56

## 2023-02-23 RX ADMIN — ACETAMINOPHEN 975 MG: 325 TABLET, FILM COATED ORAL at 22:33

## 2023-02-23 RX ADMIN — OXYCODONE HYDROCHLORIDE 5 MG: 5 TABLET ORAL at 22:33

## 2023-02-23 NOTE — ED PROVIDER NOTES
History  Chief Complaint   Patient presents with   • Wrist Problem     Pt arrives from home with open left wrist injury  States she fell tonight, denies headstrike, - thinners  Patient is a 68-year-old female, left-hand-dominant who presents with left wrist fracture  Patient reports that she was chasing her puppy, tripped over the crate and fell onto her left outstretched hand  EMS reports that there was a gross deformity on arrival and that she was bleeding from a laceration  Patient denies any head strike/loss of consciousness  Prior to Admission Medications   Prescriptions Last Dose Informant Patient Reported? Taking?    Azelastine HCl 137 MCG/SPRAY SOLN   Yes No   Sig: into each nostril daily as needed    Calcium Carbonate-Vitamin D (Calcium 500 + D) 500-125 MG-UNIT TABS   Yes No   Sig: Take by mouth daily   Multiple Vitamins-Minerals (MULTIVITAMIN WOMEN 50+ PO)   Yes No   Sig: Take by mouth daily    Turmeric Curcumin 500 MG CAPS   Yes No   Sig: Take by mouth daily   albuterol (PROVENTIL HFA,VENTOLIN HFA) 90 mcg/act inhaler   Yes No   Sig: Inhale 1 puff every 4 (four) hours as needed for wheezing   anastrozole (ARIMIDEX) 1 mg tablet   No No   Sig: Take 1 tablet (1 mg total) by mouth daily   ascorbic acid (VITAMIN C) 1000 MG tablet   No No   Sig: Take 1 tablet (1,000 mg total) by mouth every 12 (twelve) hours for 11 doses   desvenlafaxine succinate (PRISTIQ) 50 mg 24 hr tablet   Yes No   Sig: Take 50 mg by mouth daily    loratadine-pseudoephedrine (CLARITIN-D 24-HOUR)  mg per 24 hr tablet   Yes No   Sig: Take 1 tablet by mouth as needed for allergies   pantoprazole (PROTONIX) 40 mg tablet   No No   Sig: Take 1 tablet (40 mg total) by mouth 2 (two) times a day   sertraline (ZOLOFT) 100 mg tablet   Yes No   Sig: Take 100 mg by mouth daily   ursodiol (ACTIGALL) 500 MG tablet   No No   Sig: Take 2 tablets (1,000 mg total) by mouth 2 (two) times a day   zinc sulfate (ZINCATE) 220 mg capsule   No No   Sig: Take 1 capsule (220 mg total) by mouth daily for 5 doses   zolpidem (AMBIEN) 10 mg tablet   Yes No   Sig: Take 10 mg by mouth daily at bedtime      Facility-Administered Medications: None       Past Medical History:   Diagnosis Date   • Anxiety    • Morales esophagus    • Breast cancer ([facility] ) 09/24/2020    right mucinous Ca   • Colon polyp    • Gastroesophageal reflux disease without esophagitis 5/14/2019   • GERD (gastroesophageal reflux disease)    • History of gallstones    • Hypertension    • Personal history of colonic polyps 5/14/2019   • Primary biliary cirrhosis ([facility] ) 5/14/2019   • Seasonal allergies        Past Surgical History:   Procedure Laterality Date   • APPENDECTOMY     • BREAST BIOPSY Right 09/24/2020    stereo-  mucinous ca   • BREAST BIOPSY Left 10/06/2020    us bx- neg   • BREAST LUMPECTOMY Right 12/22/2020    Procedure: BREAST NEEDLE LOCALIZED LUMPECTOMY (NEEDLE LOC AT 1230); Surgeon: Franc Coleman MD;  [facility];  Service: Surgical Oncology   • COLONOSCOPY  06/24/2020     2 adenomas  Five year recall advised   • INTRAOPERATIVE RADIATION THERAPY (IORT) Right 12/22/2020    Procedure: BREAST INTRAOPERATIVE RADIATION THERAPY (IORT) BY DR Robin Henderson;  Surgeon: Franc Coleman MD;  [facility];  Service: Surgical Oncology   • LYMPH NODE BIOPSY Right 12/22/2020    Procedure: SENTINEL LYMPH NODE BIOPSY; LYMPHATIC MAPPING WITH BLUE DYE AND RADIOACTIVE DYE (INJECT AT 1400 BY DR KNOX IN THE OR); Surgeon: Franc Coleman MD;  [facility];  Service: Surgical Oncology   • MAMMO NEEDLE LOCALIZATION RIGHT (ALL INC) Right 12/22/2020   • MAMMO STEREOTACTIC BREAST BIOPSY RIGHT (ALL INC) Right 09/24/2020   • TUBAL LIGATION     • UPPER GASTROINTESTINAL ENDOSCOPY  06/24/2019    irregular z-line    biopsies negative for Morales's       Family History   Problem Relation Age of Onset   • No Known Problems Mother    • Lung cancer Father 48   • No Known Problems Sister    • No Known Problems Sister    • No Known Problems Brother    • No Known Problems Maternal Grandmother    • No Known Problems Maternal Grandfather    • No Known Problems Paternal Grandmother    • No Known Problems Paternal Grandfather    • No Known Problems Maternal Aunt    • No Known Problems Paternal Aunt    • No Known Problems Paternal Aunt    • No Known Problems Daughter    • Colon cancer Neg Hx    • Colon polyps Neg Hx      I have reviewed and agree with the history as documented  E-Cigarette/Vaping   • E-Cigarette Use Never User      E-Cigarette/Vaping Substances     Social History     Tobacco Use   • Smoking status: Never   • Smokeless tobacco: Never   Vaping Use   • Vaping Use: Never used   Substance Use Topics   • Alcohol use: Yes   • Drug use: Never       Review of Systems   Musculoskeletal:        Left wrist pain   Skin: Positive for wound  Physical Exam  Physical Exam  Vitals and nursing note reviewed  Constitutional:       General: She is not in acute distress  Appearance: Normal appearance  She is not ill-appearing, toxic-appearing or diaphoretic  HENT:      Head: Normocephalic and atraumatic  No raccoon eyes, Pierre's sign, abrasion, contusion or laceration  Mouth/Throat:      Mouth: Mucous membranes are moist    Eyes:      Extraocular Movements: Extraocular movements intact  Conjunctiva/sclera: Conjunctivae normal       Pupils: Pupils are equal, round, and reactive to light  Cardiovascular:      Rate and Rhythm: Normal rate and regular rhythm  Pulmonary:      Effort: Pulmonary effort is normal  No respiratory distress  Breath sounds: Normal breath sounds  No stridor  No wheezing, rhonchi or rales  Chest:      Chest wall: No tenderness  Abdominal:      General: Bowel sounds are normal  There is no distension  Palpations: Abdomen is soft  Tenderness: There is no abdominal tenderness  There is no guarding or rebound     Musculoskeletal:      Left forearm: Normal  No swelling, edema, deformity, lacerations, tenderness or bony tenderness  Left wrist: Swelling, deformity, laceration, tenderness and bony tenderness present  No effusion, snuff box tenderness or crepitus  Decreased range of motion  Normal pulse  Left hand: No swelling, deformity, lacerations, tenderness or bony tenderness  Normal range of motion  Normal strength  Normal sensation  There is no disruption of two-point discrimination  Normal capillary refill  Normal pulse  Comments: No midline C, T, L spine tenderness, step-offs, deformities  Pelvis stable   Skin:     General: Skin is warm and dry  Neurological:      General: No focal deficit present  Mental Status: She is alert and oriented to person, place, and time  Mental status is at baseline     Psychiatric:         Mood and Affect: Mood normal          Behavior: Behavior normal          Vital Signs  ED Triage Vitals   Temperature Pulse Respirations Blood Pressure SpO2   02/23/23 1846 02/23/23 1846 02/23/23 1846 02/23/23 1846 02/23/23 1846   98 3 °F (36 8 °C) 58 18 96/58 94 %      Temp Source Heart Rate Source Patient Position - Orthostatic VS BP Location FiO2 (%)   02/23/23 1846 02/23/23 1846 02/23/23 1846 02/23/23 1846 --   Oral Monitor Sitting Right arm       Pain Score       02/23/23 1856       10 - Worst Possible Pain           Vitals:    02/23/23 1846 02/23/23 1915 02/23/23 1930 02/23/23 2000   BP: 96/58 93/51 108/57 114/61   Pulse: 58 74 73 75   Patient Position - Orthostatic VS: Sitting Sitting Sitting Sitting         Visual Acuity  Visual Acuity    Flowsheet Row Most Recent Value   L Pupil Size (mm) 2   R Pupil Size (mm) 2          ED Medications  Medications   potassium chloride (K-DUR,KLOR-CON) CR tablet 40 mEq (has no administration in time range)   tetanus-diphtheria-acellular pertussis (BOOSTRIX) IM injection 0 5 mL (has no administration in time range)   fentanyl citrate (PF) (FOR EMS ONLY) 100 mcg/2 mL injection 100 mcg (0 mcg Does not apply Given to EMS 2/23/23 1840)   HYDROmorphone (DILAUDID) injection 1 mg (1 mg Intravenous Given 2/23/23 1856)   ceFAZolin (ANCEF) IVPB (premix in dextrose) 2,000 mg 50 mL (0 mg Intravenous Stopped 2/23/23 1923)   sodium chloride 0 9 % bolus 500 mL (500 mL Intravenous New Bag 2/23/23 1855)       Diagnostic Studies  Results Reviewed     Procedure Component Value Units Date/Time    Comprehensive metabolic panel [[id number]]  (Abnormal) Collected: 02/23/23 1855    Lab Status: Final result Specimen: Blood from Arm, Right Updated: 02/23/23 1957     Sodium 134 mmol/L      Potassium 3 2 mmol/L      Chloride 101 mmol/L      CO2 20 mmol/L      ANION GAP 13 mmol/L      BUN 12 mg/dL      Creatinine 0 65 mg/dL      Glucose 105 mg/dL      Calcium 8 4 mg/dL      AST 18 U/L      ALT 16 U/L      Alkaline Phosphatase 85 U/L      Total Protein 7 6 g/dL      Albumin 3 9 g/dL      Total Bilirubin 0 51 mg/dL      eGFR 91 ml/min/1 73sq m     Narrative:      [place] guidelines for Chronic Kidney Disease (CKD):   •  Stage 1 with normal or high GFR (GFR > 90 mL/min/1 73 square meters)  •  Stage 2 Mild CKD (GFR = 60-89 mL/min/1 73 square meters)  •  Stage 3A Moderate CKD (GFR = 45-59 mL/min/1 73 square meters)  •  Stage 3B Moderate CKD (GFR = 30-44 mL/min/1 73 square meters)  •  Stage 4 Severe CKD (GFR = 15-29 mL/min/1 73 square meters)  •  Stage 5 End Stage CKD (GFR <15 mL/min/1 73 square meters)  Note: GFR calculation is accurate only with a steady state creatinine    Protime-INR [[id number]]  (Normal) Collected: 02/23/23 1855    Lab Status: Final result Specimen: Blood from Arm, Right Updated: 02/23/23 1919     Protime 13 4 seconds      INR 0 95    APTT [[id number]]  (Normal) Collected: 02/23/23 1855    Lab Status: Final result Specimen: Blood from Arm, Right Updated: 02/23/23 1919     PTT 23 seconds     CBC and differential [[id number]] Collected: 02/23/23 1855    Lab Status: Final result Specimen: Blood from Arm, Right Updated: 02/23/23 1908     WBC 9 80 Thousand/uL      RBC 4 08 Million/uL      Hemoglobin 12 6 g/dL      Hematocrit 37 8 %      MCV 93 fL      MCH 30 9 pg      MCHC 33 3 g/dL      RDW 12 5 %      MPV 10 1 fL      Platelets 917 Thousands/uL      nRBC 0 /100 WBCs      Neutrophils Relative 55 %      Immat GRANS % 0 %      Lymphocytes Relative 37 %      Monocytes Relative 6 %      Eosinophils Relative 1 %      Basophils Relative 1 %      Neutrophils Absolute 5 30 Thousands/µL      Immature Grans Absolute 0 04 Thousand/uL      Lymphocytes Absolute 3 67 Thousands/µL      Monocytes Absolute 0 62 Thousand/µL      Eosinophils Absolute 0 11 Thousand/µL      Basophils Absolute 0 06 Thousands/µL                  XR wrist 3+ views LEFT   ED Interpretation by Lexy Kwan DO (02/23 1915)   Abnormal   Acute, comminuted, displaced, and distal radius and ulna fracture as interpreted by me independently       XR forearm 2 views LEFT    (Results Pending)              Procedures  Splint application    Date/Time: 2/23/2023 8:11 PM  Performed by: Lexy Kwan DO  Authorized by: Lexy Kwan DO   Universal Protocol:  Consent: Verbal consent obtained  Risks and benefits: risks, benefits and alternatives were discussed  Consent given by: patient  Patient understanding: patient states understanding of the procedure being performed  Required items: required blood products, implants, devices, and special equipment available  Patient identity confirmed: verbally with patient      Pre-procedure details:     Sensation:  Normal    Skin color:  Ecchymosis  Procedure details:     Laterality:  Left    Location:  Wrist    Wrist:  L wrist    Splint type:  Sugar tong    Supplies:  Cotton padding, Ortho-Glass and elastic bandage  Post-procedure details:     Pain:  Improved    Sensation:  Normal    Skin color:  Ecchymotic    Patient tolerance of procedure:   Tolerated well, no immediate complications  Comments:      THERE IS A 0 5CM LACERATION OVER THE DISTAL END OF THE WRIST OVER THE ULNA  THERE WAS A DAMP 4X4 PLACED OVER IT AND COVERED WITH A PACK OF 4X4S, THEN SPLINT APPLIED  CriticalCare Time  Performed by: Raf Cleveland DO  Authorized by: Raf Cleveland DO     Critical care provider statement:     Critical care time (minutes):  32    Critical care start time:  2/23/2023 6:44 PM    Critical care end time:  2/23/2023 8:20 PM    Critical care time was exclusive of:  Separately billable procedures and treating other patients and teaching time    Critical care was necessary to treat or prevent imminent or life-threatening deterioration of the following conditions: open fracture  Critical care was time spent personally by me on the following activities:  Blood draw for specimens, obtaining history from patient or surrogate, development of treatment plan with patient or surrogate, discussions with consultants, evaluation of patient's response to treatment, examination of patient, re-evaluation of patient's condition, ordering and review of radiographic studies, ordering and review of laboratory studies and ordering and performing treatments and interventions    I assumed direction of critical care for this patient from another provider in my specialty: no               ED Course  ED Course as of 02/23/23 2027   Thu Feb 23, 2023 1914 Tigertexted ortho, Dr Sanchez to review images and for further recommendations for plan of care  [facility] called and we discussed plan of care for patient  Currently, will place in a sugartong splint  2013 Dr Sanchez recommends admission to medicine service, NPO at midnight, cefazolin 1g q8h, and will go to OR tomorrow at 0730  Medical Decision Making  Assessment and plan:  Differential includes open fracture versus fracture with overlying laceration  We will get x-rays to further evaluate fracture type    On exam, the patient has strong radial pulse, forearm compartments are soft, sensation is intact  We will treat with Dilaudid for pain, start cefazolin secondary to concern for open fracture, update tetanus, and once imaging completed, will need urgent discussion with orthopedics  Open fracture of left wrist, initial encounter: acute illness or injury  Amount and/or Complexity of Data Reviewed  Labs: ordered  Radiology: ordered and independent interpretation performed  Risk  Prescription drug management  Decision regarding hospitalization  Disposition  Final diagnoses:   Open fracture of left wrist, initial encounter     Time reflects when diagnosis was documented in both MDM as applicable and the Disposition within this note     Time User Action Codes Description Comment    2/23/2023  7:19 PM Gordy Murray Add [[id number]] Open fracture of left wrist, initial encounter       ED Disposition     ED Disposition   Admit    Condition   Stable    Date/Time   Thu Feb 23, 2023  8:15 PM    Comment   Case was discussed with hospitalist and the patient's admission status was agreed to be Admission Status: inpatient status to the service of Dr Dianne Cheney   Follow-up Information    None         Patient's Medications   Discharge Prescriptions    No medications on file       No discharge procedures on file      PDMP Review       Value Time User    PDMP Reviewed  Yes 12/3/2020  9:51 AM Adiel Ruiz MD          ED Provider  Electronically Signed by           Genaro Ennis DO  02/23/23 2020       Genaro Ennis DO  02/23/23 2027

## 2023-02-23 NOTE — Clinical Note
Case was discussed with orthopedics and the patient's admission status was agreed to be Admission Status: inpatient status to the service of Dr Simran Lindquist

## 2023-02-24 ENCOUNTER — ANESTHESIA EVENT (OUTPATIENT)
Dept: PERIOP | Facility: HOSPITAL | Age: 68
End: 2023-02-24

## 2023-02-24 ENCOUNTER — ANESTHESIA (OUTPATIENT)
Dept: PERIOP | Facility: HOSPITAL | Age: 68
End: 2023-02-24

## 2023-02-24 ENCOUNTER — APPOINTMENT (OUTPATIENT)
Dept: RADIOLOGY | Facility: HOSPITAL | Age: 68
End: 2023-02-24

## 2023-02-24 LAB
ABO GROUP BLD: NORMAL
ABO GROUP BLD: NORMAL
ANION GAP SERPL CALCULATED.3IONS-SCNC: 8 MMOL/L (ref 4–13)
ATRIAL RATE: 71 BPM
BLD GP AB SCN SERPL QL: NEGATIVE
BUN SERPL-MCNC: 11 MG/DL (ref 5–25)
CALCIUM SERPL-MCNC: 8.3 MG/DL (ref 8.4–10.2)
CHLORIDE SERPL-SCNC: 101 MMOL/L (ref 96–108)
CO2 SERPL-SCNC: 23 MMOL/L (ref 21–32)
CREAT SERPL-MCNC: 0.59 MG/DL (ref 0.6–1.3)
ERYTHROCYTE [DISTWIDTH] IN BLOOD BY AUTOMATED COUNT: 12.7 % (ref 11.6–15.1)
GFR SERPL CREATININE-BSD FRML MDRD: 94 ML/MIN/1.73SQ M
GLUCOSE P FAST SERPL-MCNC: 125 MG/DL (ref 65–99)
GLUCOSE SERPL-MCNC: 116 MG/DL (ref 65–140)
GLUCOSE SERPL-MCNC: 125 MG/DL (ref 65–140)
HCT VFR BLD AUTO: 36.3 % (ref 34.8–46.1)
HGB BLD-MCNC: 12.4 G/DL (ref 11.5–15.4)
INR PPP: 0.92 (ref 0.84–1.19)
MCH RBC QN AUTO: 31.1 PG (ref 26.8–34.3)
MCHC RBC AUTO-ENTMCNC: 34.2 G/DL (ref 31.4–37.4)
MCV RBC AUTO: 91 FL (ref 82–98)
P AXIS: 54 DEGREES
PLATELET # BLD AUTO: 281 THOUSANDS/UL (ref 149–390)
PMV BLD AUTO: 9.8 FL (ref 8.9–12.7)
POTASSIUM SERPL-SCNC: 4.2 MMOL/L (ref 3.5–5.3)
PR INTERVAL: 150 MS
PROTHROMBIN TIME: 13.1 SECONDS (ref 11.6–14.5)
QRS AXIS: 34 DEGREES
QRSD INTERVAL: 82 MS
QT INTERVAL: 398 MS
QTC INTERVAL: 432 MS
RBC # BLD AUTO: 3.99 MILLION/UL (ref 3.81–5.12)
RH BLD: POSITIVE
RH BLD: POSITIVE
SODIUM SERPL-SCNC: 132 MMOL/L (ref 135–147)
SPECIMEN EXPIRATION DATE: NORMAL
T WAVE AXIS: 44 DEGREES
VENTRICULAR RATE: 71 BPM
WBC # BLD AUTO: 9.05 THOUSAND/UL (ref 4.31–10.16)

## 2023-02-24 DEVICE — PEG VOLAR THREADED 20MM: Type: IMPLANTABLE DEVICE | Site: WRIST | Status: FUNCTIONAL

## 2023-02-24 DEVICE — SCREW CORTICAL 3.2 X 12MM: Type: IMPLANTABLE DEVICE | Site: WRIST | Status: FUNCTIONAL

## 2023-02-24 DEVICE — IMPLANTABLE DEVICE: Type: IMPLANTABLE DEVICE | Site: WRIST | Status: FUNCTIONAL

## 2023-02-24 DEVICE — PEG VOLAR THREADED 22MM: Type: IMPLANTABLE DEVICE | Site: WRIST | Status: FUNCTIONAL

## 2023-02-24 DEVICE — PEG VOLAR THREADED 18MM: Type: IMPLANTABLE DEVICE | Site: WRIST | Status: FUNCTIONAL

## 2023-02-24 DEVICE — PEG VOLAR THREADED 16MM: Type: IMPLANTABLE DEVICE | Site: WRIST | Status: FUNCTIONAL

## 2023-02-24 RX ORDER — CEFAZOLIN SODIUM 2 G/50ML
2000 SOLUTION INTRAVENOUS EVERY 8 HOURS
Status: COMPLETED | OUTPATIENT
Start: 2023-02-24 | End: 2023-02-24

## 2023-02-24 RX ORDER — FENTANYL CITRATE/PF 50 MCG/ML
25 SYRINGE (ML) INJECTION
Status: DISCONTINUED | OUTPATIENT
Start: 2023-02-24 | End: 2023-02-24 | Stop reason: HOSPADM

## 2023-02-24 RX ORDER — OXYCODONE HYDROCHLORIDE 5 MG/1
5 TABLET ORAL EVERY 6 HOURS PRN
Qty: 25 TABLET | Refills: 0 | Status: SHIPPED | OUTPATIENT
Start: 2023-02-24 | End: 2023-03-06

## 2023-02-24 RX ORDER — LIDOCAINE HYDROCHLORIDE 10 MG/ML
INJECTION, SOLUTION EPIDURAL; INFILTRATION; INTRACAUDAL; PERINEURAL AS NEEDED
Status: DISCONTINUED | OUTPATIENT
Start: 2023-02-24 | End: 2023-02-24

## 2023-02-24 RX ORDER — EPHEDRINE SULFATE 50 MG/ML
INJECTION INTRAVENOUS AS NEEDED
Status: DISCONTINUED | OUTPATIENT
Start: 2023-02-24 | End: 2023-02-24

## 2023-02-24 RX ORDER — FENTANYL CITRATE 50 UG/ML
INJECTION, SOLUTION INTRAMUSCULAR; INTRAVENOUS AS NEEDED
Status: DISCONTINUED | OUTPATIENT
Start: 2023-02-24 | End: 2023-02-24

## 2023-02-24 RX ORDER — MAGNESIUM SULFATE HEPTAHYDRATE 40 MG/ML
2 INJECTION, SOLUTION INTRAVENOUS ONCE
Status: COMPLETED | OUTPATIENT
Start: 2023-02-24 | End: 2023-02-24

## 2023-02-24 RX ORDER — GLYCOPYRROLATE 0.2 MG/ML
INJECTION INTRAMUSCULAR; INTRAVENOUS AS NEEDED
Status: DISCONTINUED | OUTPATIENT
Start: 2023-02-24 | End: 2023-02-24

## 2023-02-24 RX ORDER — VANCOMYCIN HYDROCHLORIDE 1 G/20ML
INJECTION, POWDER, LYOPHILIZED, FOR SOLUTION INTRAVENOUS AS NEEDED
Status: DISCONTINUED | OUTPATIENT
Start: 2023-02-24 | End: 2023-02-24 | Stop reason: HOSPADM

## 2023-02-24 RX ORDER — ONDANSETRON 2 MG/ML
4 INJECTION INTRAMUSCULAR; INTRAVENOUS ONCE AS NEEDED
Status: DISCONTINUED | OUTPATIENT
Start: 2023-02-24 | End: 2023-02-24 | Stop reason: HOSPADM

## 2023-02-24 RX ORDER — DEXAMETHASONE SODIUM PHOSPHATE 4 MG/ML
INJECTION, SOLUTION INTRA-ARTICULAR; INTRALESIONAL; INTRAMUSCULAR; INTRAVENOUS; SOFT TISSUE AS NEEDED
Status: DISCONTINUED | OUTPATIENT
Start: 2023-02-24 | End: 2023-02-24

## 2023-02-24 RX ORDER — PROPOFOL 10 MG/ML
INJECTION, EMULSION INTRAVENOUS AS NEEDED
Status: DISCONTINUED | OUTPATIENT
Start: 2023-02-24 | End: 2023-02-24

## 2023-02-24 RX ORDER — MIDAZOLAM HYDROCHLORIDE 2 MG/2ML
INJECTION, SOLUTION INTRAMUSCULAR; INTRAVENOUS AS NEEDED
Status: DISCONTINUED | OUTPATIENT
Start: 2023-02-24 | End: 2023-02-24

## 2023-02-24 RX ORDER — ROPIVACAINE HYDROCHLORIDE 5 MG/ML
INJECTION, SOLUTION EPIDURAL; INFILTRATION; PERINEURAL AS NEEDED
Status: DISCONTINUED | OUTPATIENT
Start: 2023-02-24 | End: 2023-02-24

## 2023-02-24 RX ORDER — HYDROMORPHONE HCL IN WATER/PF 6 MG/30 ML
0.2 PATIENT CONTROLLED ANALGESIA SYRINGE INTRAVENOUS EVERY 4 HOURS PRN
Status: DISCONTINUED | OUTPATIENT
Start: 2023-02-24 | End: 2023-02-25 | Stop reason: HOSPADM

## 2023-02-24 RX ORDER — SODIUM CHLORIDE, SODIUM LACTATE, POTASSIUM CHLORIDE, CALCIUM CHLORIDE 600; 310; 30; 20 MG/100ML; MG/100ML; MG/100ML; MG/100ML
INJECTION, SOLUTION INTRAVENOUS CONTINUOUS PRN
Status: DISCONTINUED | OUTPATIENT
Start: 2023-02-24 | End: 2023-02-24

## 2023-02-24 RX ORDER — MAGNESIUM HYDROXIDE 1200 MG/15ML
LIQUID ORAL AS NEEDED
Status: DISCONTINUED | OUTPATIENT
Start: 2023-02-24 | End: 2023-02-24 | Stop reason: HOSPADM

## 2023-02-24 RX ORDER — POTASSIUM CHLORIDE 14.9 MG/ML
INJECTION INTRAVENOUS CONTINUOUS PRN
Status: DISCONTINUED | OUTPATIENT
Start: 2023-02-24 | End: 2023-02-24

## 2023-02-24 RX ADMIN — OXYCODONE HYDROCHLORIDE 5 MG: 5 TABLET ORAL at 10:41

## 2023-02-24 RX ADMIN — FENTANYL CITRATE 25 MCG: 50 INJECTION, SOLUTION INTRAMUSCULAR; INTRAVENOUS at 08:22

## 2023-02-24 RX ADMIN — Medication 2.5 MG: at 18:54

## 2023-02-24 RX ADMIN — FENTANYL CITRATE 25 MCG: 50 INJECTION, SOLUTION INTRAMUSCULAR; INTRAVENOUS at 08:35

## 2023-02-24 RX ADMIN — MAGNESIUM SULFATE HEPTAHYDRATE 2 G: 2 INJECTION, SOLUTION INTRAVENOUS at 01:45

## 2023-02-24 RX ADMIN — CEFAZOLIN SODIUM 2000 MG: 2 SOLUTION INTRAVENOUS at 23:07

## 2023-02-24 RX ADMIN — ACETAMINOPHEN 975 MG: 325 TABLET, FILM COATED ORAL at 05:03

## 2023-02-24 RX ADMIN — ACETAMINOPHEN 975 MG: 325 TABLET, FILM COATED ORAL at 14:28

## 2023-02-24 RX ADMIN — ENOXAPARIN SODIUM 40 MG: 100 INJECTION SUBCUTANEOUS at 12:00

## 2023-02-24 RX ADMIN — PROPOFOL 170 MG: 10 INJECTION, EMULSION INTRAVENOUS at 07:41

## 2023-02-24 RX ADMIN — EPHEDRINE SULFATE 10 MG: 50 INJECTION INTRAVENOUS at 08:02

## 2023-02-24 RX ADMIN — PANTOPRAZOLE SODIUM 40 MG: 40 TABLET, DELAYED RELEASE ORAL at 16:24

## 2023-02-24 RX ADMIN — FENTANYL CITRATE 25 MCG: 50 INJECTION, SOLUTION INTRAMUSCULAR; INTRAVENOUS at 08:49

## 2023-02-24 RX ADMIN — SODIUM CHLORIDE, SODIUM LACTATE, POTASSIUM CHLORIDE, AND CALCIUM CHLORIDE: .6; .31; .03; .02 INJECTION, SOLUTION INTRAVENOUS at 07:16

## 2023-02-24 RX ADMIN — EPHEDRINE SULFATE 10 MG: 50 INJECTION INTRAVENOUS at 07:51

## 2023-02-24 RX ADMIN — HYDROMORPHONE HYDROCHLORIDE 0.2 MG: 0.2 INJECTION, SOLUTION INTRAMUSCULAR; INTRAVENOUS; SUBCUTANEOUS at 03:32

## 2023-02-24 RX ADMIN — SERTRALINE HYDROCHLORIDE 100 MG: 100 TABLET ORAL at 10:42

## 2023-02-24 RX ADMIN — DEXTROSE, SODIUM CHLORIDE, AND POTASSIUM CHLORIDE 100 ML/HR: 5; .45; .15 INJECTION INTRAVENOUS at 03:16

## 2023-02-24 RX ADMIN — URSODIOL 900 MG: 300 CAPSULE ORAL at 18:46

## 2023-02-24 RX ADMIN — METHOCARBAMOL 500 MG: 500 TABLET ORAL at 02:36

## 2023-02-24 RX ADMIN — MIDAZOLAM 2 MG: 1 INJECTION INTRAMUSCULAR; INTRAVENOUS at 07:19

## 2023-02-24 RX ADMIN — Medication 2.5 MG: at 00:20

## 2023-02-24 RX ADMIN — HYDROMORPHONE HYDROCHLORIDE 0.2 MG: 0.2 INJECTION, SOLUTION INTRAMUSCULAR; INTRAVENOUS; SUBCUTANEOUS at 16:29

## 2023-02-24 RX ADMIN — URSODIOL 900 MG: 300 CAPSULE ORAL at 10:44

## 2023-02-24 RX ADMIN — CEFAZOLIN SODIUM 2000 MG: 2 SOLUTION INTRAVENOUS at 00:30

## 2023-02-24 RX ADMIN — ROPIVACAINE HYDROCHLORIDE 25 MG: 5 INJECTION, SOLUTION EPIDURAL; INFILTRATION; PERINEURAL at 07:42

## 2023-02-24 RX ADMIN — EPHEDRINE SULFATE 10 MG: 50 INJECTION INTRAVENOUS at 07:46

## 2023-02-24 RX ADMIN — LIDOCAINE HYDROCHLORIDE 50 MG: 10 INJECTION, SOLUTION EPIDURAL; INFILTRATION; INTRACAUDAL; PERINEURAL at 07:41

## 2023-02-24 RX ADMIN — CEFAZOLIN SODIUM 2000 MG: 2 SOLUTION INTRAVENOUS at 07:38

## 2023-02-24 RX ADMIN — Medication 1 TABLET: at 10:42

## 2023-02-24 RX ADMIN — FENTANYL CITRATE 25 MCG: 50 INJECTION, SOLUTION INTRAMUSCULAR; INTRAVENOUS at 09:01

## 2023-02-24 RX ADMIN — GLYCOPYRROLATE 0.1 MG: 0.2 INJECTION, SOLUTION INTRAMUSCULAR; INTRAVENOUS at 07:19

## 2023-02-24 RX ADMIN — ACETAMINOPHEN 975 MG: 325 TABLET, FILM COATED ORAL at 21:34

## 2023-02-24 RX ADMIN — FENTANYL CITRATE 25 MCG: 50 INJECTION INTRAMUSCULAR; INTRAVENOUS at 09:35

## 2023-02-24 RX ADMIN — POTASSIUM CHLORIDE: 14.9 INJECTION, SOLUTION INTRAVENOUS at 08:00

## 2023-02-24 RX ADMIN — CEFAZOLIN SODIUM 2000 MG: 2 SOLUTION INTRAVENOUS at 16:20

## 2023-02-24 RX ADMIN — ANASTROZOLE 1 MG: 1 TABLET, COATED ORAL at 10:43

## 2023-02-24 RX ADMIN — ONDANSETRON 4 MG: 2 INJECTION INTRAMUSCULAR; INTRAVENOUS at 07:19

## 2023-02-24 RX ADMIN — SODIUM CHLORIDE, SODIUM LACTATE, POTASSIUM CHLORIDE, AND CALCIUM CHLORIDE: .6; .31; .03; .02 INJECTION, SOLUTION INTRAVENOUS at 08:45

## 2023-02-24 RX ADMIN — DEXAMETHASONE SODIUM PHOSPHATE 4 MG: 4 INJECTION, SOLUTION INTRAMUSCULAR; INTRAVENOUS at 07:42

## 2023-02-24 NOTE — H&P
[place]  H&P- Drew Irons 1955, 75 y o  female MRN: [id number]  Unit/Bed#: -01 Encounter: [id number]  Primary Care Provider: Mare Severs, DO   Date and time admitted to hospital: 2/23/2023  6:37 PM    * Fracture of distal radius and ulna, left, open type I or II, initial encounter  Assessment & Plan  • S/p mechanical fall on to outstretched left hand  • Left hand dominant   • Ortho to take to OR 2/24 at 0730   • Pain control with oxy, tylenol, dilaudid, and robaxin   • NV distally on admission - continue NV checks q4h overnight     Hypokalemia  Assessment & Plan  • K at 3 2 - given 40PO   • Check Mg - replete if needed   • Placed on D5 - 1/2NS - 20KCl IVF while NPO     Malignant neoplasm of upper-outer quadrant of right breast in female, estrogen receptor positive (HCC)  Assessment & Plan  • Follows with [facility] heme/onc  • S/p lumpectomy and sentinel node biopsy   • Continue anastrozole     Morales's esophagus without dysplasia  Assessment & Plan  • Continue home protonix 40mg BID     Anxiety  Assessment & Plan  • Euthymic on admission   • Continue home zoloft 100mg       VTE Pharmacologic Prophylaxis: VTE Score: 3 Moderate Risk (Score 3-4) - Pharmacological DVT Prophylaxis Ordered: enoxaparin (Lovenox)   ordered for post-op  Code Status: Level 1 - Full Code   Discussion with family: Updated  () at bedside      Anticipated Length of Stay: Patient will be admitted on an observation basis with an anticipated length of stay of less than 2 midnights secondary to left distal radius and ulna open fracture, anxiety, Morales's, breast cancer      Total Time Spent on Date of Encounter in care of patient: 55 minutes This time was spent on one or more of the following: performing physical exam; counseling and coordination of care; obtaining or reviewing history; documenting in the medical record; reviewing/ordering tests, medications or procedures; communicating with other healthcare professionals and discussing with patient's family/caregivers      Chief Complaint: "I fell on to my outstretched left hand"     History of Present Illness:  Hayden Painting is a 75 y o  female with a PMH of breast cancer s/p lumpectomy on anastrozole, anxiety, and Morales's esophagus who presents with left wrist fracture s/p mechanical just PTA  Pt reports she was cooking and dropped a knife, which her 2 month old puppy picked up and ran away with  While running after the puppy, she tripped causing her to fall forward and catch herself with her left hand  Immediate pain onset  No preceding symptoms  No other acute symptoms  No head strike  No LOC       Review of Systems:  Review of Systems   Constitutional: Negative for chills and fever  HENT: Negative for congestion  Respiratory: Negative for cough and shortness of breath  Cardiovascular: Negative for chest pain and leg swelling  Gastrointestinal: Negative for abdominal pain, constipation, diarrhea, nausea and vomiting  Genitourinary: Negative for difficulty urinating, dysuria and hematuria  Musculoskeletal: Positive for myalgias (left wrist pain and injury )  Neurological: Negative for weakness and numbness     All other systems reviewed and are negative         Past Medical and Surgical History:   Medical History        Past Medical History:   Diagnosis Date   • Anxiety     • Morales esophagus     • Breast cancer ([facility] ) 09/24/2020     right mucinous Ca   • Colon polyp     • Gastroesophageal reflux disease without esophagitis 5/14/2019   • GERD (gastroesophageal reflux disease)     • History of gallstones     • Hypertension     • Personal history of colonic polyps 5/14/2019   • Primary biliary cirrhosis ([facility] [address] ) 5/14/2019   • Seasonal allergies              Surgical History         Past Surgical History:   Procedure Laterality Date   • APPENDECTOMY       • BREAST BIOPSY Right 09/24/2020     stereo-  mucinous ca   • BREAST BIOPSY Left 10/06/2020     us bx- neg   • BREAST LUMPECTOMY Right 12/22/2020     Procedure: BREAST NEEDLE LOCALIZED LUMPECTOMY (NEEDLE LOC AT 1230); Surgeon: Laura Flaherty MD;  [facility];  Service: Surgical Oncology   • COLONOSCOPY   06/24/2020      2 adenomas  Five year recall advised   • INTRAOPERATIVE RADIATION THERAPY (IORT) Right 12/22/2020     Procedure: BREAST INTRAOPERATIVE RADIATION THERAPY (IORT) BY DR Roxie Calhoun;  Surgeon: Laura Flaherty MD;  [facility];  Service: Surgical Oncology   • LYMPH NODE BIOPSY Right 12/22/2020     Procedure: SENTINEL LYMPH NODE BIOPSY; LYMPHATIC MAPPING WITH BLUE DYE AND RADIOACTIVE DYE (INJECT AT 1400 BY DR KNOX IN THE OR); Surgeon: Laura Flaherty MD;  [facility];  Service: Surgical Oncology   • MAMMO NEEDLE LOCALIZATION RIGHT (ALL INC) Right 12/22/2020   • MAMMO STEREOTACTIC BREAST BIOPSY RIGHT (ALL INC) Right 09/24/2020   • TUBAL LIGATION       • UPPER GASTROINTESTINAL ENDOSCOPY   06/24/2019     irregular z-line  biopsies negative for Morales's            Meds/Allergies:          Prior to Admission medications    Medication Sig Start Date End Date Taking?  Authorizing Provider   albuterol (PROVENTIL HFA,VENTOLIN HFA) 90 mcg/act inhaler Inhale 1 puff every 4 (four) hours as needed for wheezing     Yes Historical Provider, MD   anastrozole (ARIMIDEX) 1 mg tablet Take 1 tablet (1 mg total) by mouth daily 10/4/22   Yes Adama Moralez MD   Azelastine HCl 137 MCG/SPRAY SOLN into each nostril daily as needed      Yes Historical Provider, MD   Calcium Carbonate-Vitamin D (Calcium 500 + D) 500-125 MG-UNIT TABS Take by mouth daily     Yes Historical Provider, MD   CRANBERRY EXTRACT PO Take by mouth     Yes Historical Provider, MD   loratadine-pseudoephedrine (CLARITIN-D 24-HOUR)  mg per 24 hr tablet Take 1 tablet by mouth as needed for allergies     Yes Historical Provider, MD   Multiple Vitamins-Minerals (MULTIVITAMIN WOMEN 50+ PO) Take by mouth daily      Yes Historical Provider, MD   pantoprazole (PROTONIX) 40 mg tablet Take 1 tablet (40 mg total) by mouth 2 (two) times a day 12/14/22   Yes Melisa Georges MD   sertraline (ZOLOFT) 100 mg tablet Take 100 mg by mouth daily     Yes Historical Provider, MD   Turmeric Curcumin 500 MG CAPS Take by mouth daily     Yes Historical Provider, MD   ursodiol (ACTIGALL) 500 MG tablet Take 2 tablets (1,000 mg total) by mouth 2 (two) times a day 12/14/22   Yes Melisa Georges MD   zolpidem (AMBIEN) 10 mg tablet Take 10 mg by mouth daily at bedtime 3/30/21   Yes Historical Provider, MD   ascorbic acid (VITAMIN C) 1000 MG tablet Take 1 tablet (1,000 mg total) by mouth every 12 (twelve) hours for 11 doses 5/7/21 2/23/23 Yes Kristy BOYER PA-C   zinc sulfate (ZINCATE) 220 mg capsule Take 1 capsule (220 mg total) by mouth daily for 5 doses 5/8/21 2/23/23 Yes Kristy BOYER PA-C   desvenlafaxine succinate (PRISTIQ) 50 mg 24 hr tablet Take 50 mg by mouth daily    2/23/23   Historical Provider, MD      I have reviewed home medications with patient personally      Allergies:         Allergies   Allergen Reactions   • Aspirin GI Intolerance         Social History:  Marital Status: /Civil Union   Occupation: retired   Patient Pre-hospital Living Situation: Home, With spouse  Patient Pre-hospital Level of Mobility: walks  Patient Pre-hospital Diet Restrictions: none   Substance Use History:   Social History          Substance and Sexual Activity   Alcohol Use Yes      Social History          Tobacco Use   Smoking Status Never   Smokeless Tobacco Never      Social History          Substance and Sexual Activity   Drug Use Never         Family History:  Family History         Family History   Problem Relation Age of Onset   • No Known Problems Mother     • Lung cancer Father 48   • No Known Problems Sister     • No Known Problems Sister     • No Known Problems Brother     • No Known Problems Maternal Grandmother     • No Known Problems Maternal Grandfather     • No Known Problems Paternal Grandmother     • No Known Problems Paternal Grandfather     • No Known Problems Maternal Aunt     • No Known Problems Paternal Aunt     • No Known Problems Paternal Aunt     • No Known Problems Daughter     • Colon cancer Neg Hx     • Colon polyps Neg Hx              Physical Exam:      Vitals:   Blood Pressure: 110/69 (02/23/23 2230)  Pulse: 79 (02/23/23 2230)  Temperature: 98 3 °F (36 8 °C) (02/23/23 1846)  Temp Source: Oral (02/23/23 1846)  Respirations: 18 (02/23/23 2230)  Weight - Scale: 86 2 kg (190 lb) (02/23/23 1846)  SpO2: 95 % (02/23/23 2230)     Physical Exam  Vitals and nursing note reviewed  Constitutional:       General: She is not in acute distress  Appearance: Normal appearance  She is not ill-appearing  Comments: Pleasant and conversational     HENT:      Head: Normocephalic  Nose: Nose normal       Mouth/Throat:      Mouth: Mucous membranes are moist    Eyes:      Extraocular Movements: Extraocular movements intact  Conjunctiva/sclera: Conjunctivae normal    Cardiovascular:      Rate and Rhythm: Normal rate and regular rhythm  Pulses: Normal pulses  Heart sounds: No murmur heard  Pulmonary:      Effort: Pulmonary effort is normal       Breath sounds: Normal breath sounds  Abdominal:      General: Abdomen is flat  Palpations: Abdomen is soft  Musculoskeletal:      Cervical back: Normal range of motion  Right lower leg: No edema  Left lower leg: No edema  Comments: LUE immobilized in splint  Capillary refill in all fingers on left is <2 seconds  Sensation fully intact in all fingers distally  Skin:     General: Skin is warm and dry  Coloration: Skin is not pale  Neurological:      General: No focal deficit present  Mental Status: She is alert and oriented to person, place, and time     Psychiatric:         Mood and Affect: Mood normal          Thought Content: Thought content normal             Additional Data:      Lab Results:       Results from last 7 days   Lab Units 02/23/23  1855   WBC Thousand/uL 9 80   HEMOGLOBIN g/dL 12 6   HEMATOCRIT % 37 8   PLATELETS Thousands/uL 313   NEUTROS PCT % 55   LYMPHS PCT % 37   MONOS PCT % 6   EOS PCT % 1           Results from last 7 days   Lab Units 02/23/23  1855   SODIUM mmol/L 134*   POTASSIUM mmol/L 3 2*   CHLORIDE mmol/L 101   CO2 mmol/L 20*   BUN mg/dL 12   CREATININE mg/dL 0 65   ANION GAP mmol/L 13   CALCIUM mg/dL 8 4   ALBUMIN g/dL 3 9   TOTAL BILIRUBIN mg/dL 0 51   ALK PHOS U/L 85   ALT U/L 16   AST U/L 18   GLUCOSE RANDOM mg/dL 105           Results from last 7 days   Lab Units 02/23/23  1855   INR   0 95                     Lines/Drains:      Invasive Devices            Peripheral Intravenous Line  Duration             Peripheral IV 02/23/23 Right Antecubital <1 day                          Imaging: Personally reviewed the following imaging: wrist XR with distal communiuted fractures of left ulna and radius   XR wrist 3+ views LEFT   ED Interpretation by Jazmin Velarde DO (02/23 1915)   Abnormal   Acute, comminuted, displaced, and distal radius and ulna fracture as interpreted by me independently        XR forearm 2 views LEFT    (Results Pending)         EKG and Other Studies Reviewed on Admission:   • EKG: ordered for AM

## 2023-02-24 NOTE — ASSESSMENT & PLAN NOTE
• K at 3 2 - given 40PO   • Check Mg - replete if needed   • Placed on D5 - 1/2NS - 20KCl IVF while NPO

## 2023-02-24 NOTE — ANESTHESIA POSTPROCEDURE EVALUATION
Post-Op Assessment Note    CV Status:  Stable  Pain Score: 0    Pain management: adequate     Mental Status:  Alert and awake   Hydration Status:  Stable   PONV Controlled:  None   Airway Patency:  Patent      Post Op Vitals Reviewed: Yes      Staff: CRNA         No notable events documented      /84 (02/24/23 0914)    Temp (!) 97 2 °F (36 2 °C) (02/24/23 0914)    Pulse 97 (02/24/23 0914)   Resp 18 (02/24/23 0914)    SpO2 98 % (02/24/23 0914)

## 2023-02-24 NOTE — ANESTHESIA PREPROCEDURE EVALUATION
Procedure:  OPEN REDUCTION W/ INTERNAL FIXATION (ORIF) RADIUS / ULNA (WRIST) (Left: Wrist)    Relevant Problems   GI/HEPATIC   (+) Gastroesophageal reflux disease without esophagitis   (+) Primary biliary cirrhosis (HCC)      GYN   (+) Malignant neoplasm of upper-outer quadrant of right breast in female, estrogen receptor positive (HCC)      NEURO/PSYCH   (+) Anxiety   (+) Depression   (+) Personal history of colonic polyps      Other   (+) Use of anastrozole (Arimidex)        Physical Exam    Airway    Mallampati score: II  TM Distance: >3 FB  Neck ROM: full     Dental       Cardiovascular      Pulmonary      Other Findings        Anesthesia Plan  ASA Score- 2 Emergent    Anesthesia Type- general with ASA Monitors  Additional Monitors:   Airway Plan:           Plan Factors-    Chart reviewed  Induction- intravenous  Postoperative Plan-     Informed Consent- Anesthetic plan and risks discussed with patient  I personally reviewed this patient with the CRNA  Discussed and agreed on the Anesthesia Plan with the TARIQ Holland

## 2023-02-24 NOTE — H&P
[place]  H&P- Vijay Mcmullen 1955, 75 y o  female MRN: [id number]  Unit/Bed#: OVR 04 Encounter: [id number]  Primary Care Provider: Paulo Rodriguez DO   Date and time admitted to hospital: 2/23/2023  6:37 PM    * Fracture of distal radius and ulna, left, open type I or II, initial encounter  Assessment & Plan  · S/p mechanical fall on to outstretched left hand  · Left hand dominant   · Ortho to take to OR 2/24 at 0730   · Pain control with oxy, tylenol, dilaudid, and robaxin   · NV distally on admission - continue NV checks q4h overnight     Hypokalemia  Assessment & Plan  · K at 3 2 - given 40PO   · Check Mg - replete if needed   · Placed on D5 - 1/2NS - 20KCl IVF while NPO     Malignant neoplasm of upper-outer quadrant of right breast in female, estrogen receptor positive ([facility] [address] )  Assessment & Plan  · Follows with [facility]/onc  · S/p lumpectomy and sentinel node biopsy   · Continue anastrozole     Morales's esophagus without dysplasia  Assessment & Plan  · Continue home protonix 40mg BID     Anxiety  Assessment & Plan  · Euthymic on admission   · Continue home zoloft 100mg     VTE Pharmacologic Prophylaxis: VTE Score: 3 Moderate Risk (Score 3-4) - Pharmacological DVT Prophylaxis Ordered: enoxaparin (Lovenox)  ordered for post-op  Code Status: Level 1 - Full Code   Discussion with family: Updated  () at bedside  Anticipated Length of Stay: Patient will be admitted on an observation basis with an anticipated length of stay of less than 2 midnights secondary to left distal radius and ulna open fracture, anxiety, Morales's, breast cancer      Total Time Spent on Date of Encounter in care of patient: 55 minutes This time was spent on one or more of the following: performing physical exam; counseling and coordination of care; obtaining or reviewing history; documenting in the medical record; reviewing/ordering tests, medications or procedures; communicating with other healthcare professionals and discussing with patient's family/caregivers  Chief Complaint: "I fell on to my outstretched left hand"    History of Present Illness:  Vita Lara is a 75 y o  female with a PMH of breast cancer s/p lumpectomy on anastrozole, anxiety, and Morales's esophagus who presents with left wrist fracture s/p mechanical just PTA  Pt reports she was cooking and dropped a knife, which her 2 month old puppy picked up and ran away with  While running after the puppy, she tripped causing her to fall forward and catch herself with her left hand  Immediate pain onset  No preceding symptoms  No other acute symptoms  No head strike  No LOC  Review of Systems:  Review of Systems   Constitutional: Negative for chills and fever  HENT: Negative for congestion  Respiratory: Negative for cough and shortness of breath  Cardiovascular: Negative for chest pain and leg swelling  Gastrointestinal: Negative for abdominal pain, constipation, diarrhea, nausea and vomiting  Genitourinary: Negative for difficulty urinating, dysuria and hematuria  Musculoskeletal: Positive for myalgias (left wrist pain and injury )  Neurological: Negative for weakness and numbness  All other systems reviewed and are negative        Past Medical and Surgical History:   Past Medical History:   Diagnosis Date   • Anxiety    • Morales esophagus    • [facility] ([facility] ) 09/24/2020    right mucinous Ca   • Colon polyp    • Gastroesophageal reflux disease without esophagitis 5/14/2019   • GERD (gastroesophageal reflux disease)    • History of gallstones    • Hypertension    • Personal history of colonic polyps 5/14/2019   • Primary biliary cirrhosis ([facility] [address] ) 5/14/2019   • Seasonal allergies        Past Surgical History:   Procedure Laterality Date   • APPENDECTOMY     • BREAST BIOPSY Right 09/24/2020    stereo-  mucinous ca   • BREAST BIOPSY Left 10/06/2020    us bx- neg   • BREAST LUMPECTOMY Right 12/22/2020 Procedure: BREAST NEEDLE LOCALIZED LUMPECTOMY (NEEDLE LOC AT 5770); Surgeon: Jazmin Patrick MD;  [facility];  Service: Surgical Oncology   • COLONOSCOPY  06/24/2020     2 adenomas  Five year recall advised   • INTRAOPERATIVE RADIATION THERAPY (IORT) Right 12/22/2020    Procedure: BREAST INTRAOPERATIVE RADIATION THERAPY (IORT) BY DR Trupti Wills;  Surgeon: Jazmin Patrick MD;  [facility];  Service: Surgical Oncology   • LYMPH NODE BIOPSY Right 12/22/2020    Procedure: SENTINEL LYMPH NODE BIOPSY; LYMPHATIC MAPPING WITH BLUE DYE AND RADIOACTIVE DYE (INJECT AT 1400 BY DR KNOX IN THE OR); Surgeon: Jazmin Patrick MD;  [facility];  Service: Surgical Oncology   • MAMMO NEEDLE LOCALIZATION RIGHT (ALL INC) Right 12/22/2020   • MAMMO STEREOTACTIC BREAST BIOPSY RIGHT (ALL INC) Right 09/24/2020   • TUBAL LIGATION     • UPPER GASTROINTESTINAL ENDOSCOPY  06/24/2019    irregular z-line  biopsies negative for Morales's       Meds/Allergies:  Prior to Admission medications    Medication Sig Start Date End Date Taking?  Authorizing Provider   albuterol (PROVENTIL HFA,VENTOLIN HFA) 90 mcg/act inhaler Inhale 1 puff every 4 (four) hours as needed for wheezing   Yes Historical Provider, MD   anastrozole (ARIMIDEX) 1 mg tablet Take 1 tablet (1 mg total) by mouth daily 10/4/22  Yes Bessie Sylvester MD   Azelastine HCl 137 MCG/SPRAY SOLN into each nostril daily as needed    Yes Historical Provider, MD   Calcium Carbonate-Vitamin D (Calcium 500 + D) 500-125 MG-UNIT TABS Take by mouth daily   Yes Historical Provider, MD   CRANBERRY EXTRACT PO Take by mouth   Yes Historical Provider, MD   loratadine-pseudoephedrine (CLARITIN-D 24-HOUR)  mg per 24 hr tablet Take 1 tablet by mouth as needed for allergies   Yes Historical Provider, MD   Multiple Vitamins-Minerals (MULTIVITAMIN WOMEN 50+ PO) Take by mouth daily    Yes Historical Provider, MD   pantoprazole (PROTONIX) 40 mg tablet Take 1 tablet (40 mg total) by mouth 2 (two) times a day 12/14/22  Yes Reinier Dela Cruz MD   sertraline (ZOLOFT) 100 mg tablet Take 100 mg by mouth daily   Yes Historical Provider, MD   Turmeric Curcumin 500 MG CAPS Take by mouth daily   Yes Historical Provider, MD   ursodiol (ACTIGALL) 500 MG tablet Take 2 tablets (1,000 mg total) by mouth 2 (two) times a day 12/14/22  Yes Reinier Dela Cruz MD   zolpidem (AMBIEN) 10 mg tablet Take 10 mg by mouth daily at bedtime 3/30/21  Yes Historical Provider, MD   ascorbic acid (VITAMIN C) 1000 MG tablet Take 1 tablet (1,000 mg total) by mouth every 12 (twelve) hours for 11 doses 5/7/21 2/23/23 Yes Kristy BOYER PA-C   zinc sulfate (ZINCATE) 220 mg capsule Take 1 capsule (220 mg total) by mouth daily for 5 doses 5/8/21 2/23/23 Yes Kristy BOYER PA-C   desvenlafaxine succinate (PRISTIQ) 50 mg 24 hr tablet Take 50 mg by mouth daily   2/23/23  Historical Provider, MD     I have reviewed home medications with patient personally  Allergies:    Allergies   Allergen Reactions   • Aspirin GI Intolerance       Social History:  Marital Status: /Civil Union   Occupation: retired   Patient Pre-hospital Living Situation: Home, With spouse  Patient Pre-hospital Level of Mobility: walks  Patient Pre-hospital Diet Restrictions: none   Substance Use History:   Social History     Substance and Sexual Activity   Alcohol Use Yes     Social History     Tobacco Use   Smoking Status Never   Smokeless Tobacco Never     Social History     Substance and Sexual Activity   Drug Use Never       Family History:  Family History   Problem Relation Age of Onset   • No Known Problems Mother    • Lung cancer Father 48   • No Known Problems Sister    • No Known Problems Sister    • No Known Problems Brother    • No Known Problems Maternal Grandmother    • No Known Problems Maternal Grandfather    • No Known Problems Paternal Grandmother    • No Known Problems Paternal Grandfather    • No Known Problems Maternal Aunt    • No Known Problems Paternal Aunt    • No Known Problems Paternal Aunt    • No Known Problems Daughter    • Colon cancer Neg Hx    • Colon polyps Neg Hx        Physical Exam:     Vitals:   Blood Pressure: 110/69 (02/23/23 2230)  Pulse: 79 (02/23/23 2230)  Temperature: 98 3 °F (36 8 °C) (02/23/23 1846)  Temp Source: Oral (02/23/23 1846)  Respirations: 18 (02/23/23 2230)  Weight - Scale: 86 2 kg (190 lb) (02/23/23 1846)  SpO2: 95 % (02/23/23 2230)    Physical Exam  Vitals and nursing note reviewed  Constitutional:       General: She is not in acute distress  Appearance: Normal appearance  She is not ill-appearing  Comments: Pleasant and conversational     HENT:      Head: Normocephalic  Nose: Nose normal       Mouth/Throat:      Mouth: Mucous membranes are moist    Eyes:      Extraocular Movements: Extraocular movements intact  Conjunctiva/sclera: Conjunctivae normal    Cardiovascular:      Rate and Rhythm: Normal rate and regular rhythm  Pulses: Normal pulses  Heart sounds: No murmur heard  Pulmonary:      Effort: Pulmonary effort is normal       Breath sounds: Normal breath sounds  Abdominal:      General: Abdomen is flat  Palpations: Abdomen is soft  Musculoskeletal:      Cervical back: Normal range of motion  Right lower leg: No edema  Left lower leg: No edema  Comments: LUE immobilized in splint  Capillary refill in all fingers on left is <2 seconds  Sensation fully intact in all fingers distally  Skin:     General: Skin is warm and dry  Coloration: Skin is not pale  Neurological:      General: No focal deficit present  Mental Status: She is alert and oriented to person, place, and time  Psychiatric:         Mood and Affect: Mood normal          Thought Content:  Thought content normal           Additional Data:     Lab Results:  Results from last 7 days   Lab Units 02/23/23  1855   WBC Thousand/uL 9 80   HEMOGLOBIN g/dL 12 6   HEMATOCRIT % 37 8 PLATELETS Thousands/uL 313   NEUTROS PCT % 55   LYMPHS PCT % 37   MONOS PCT % 6   EOS PCT % 1     Results from last 7 days   Lab Units 02/23/23  1855   SODIUM mmol/L 134*   POTASSIUM mmol/L 3 2*   CHLORIDE mmol/L 101   CO2 mmol/L 20*   BUN mg/dL 12   CREATININE mg/dL 0 65   ANION GAP mmol/L 13   CALCIUM mg/dL 8 4   ALBUMIN g/dL 3 9   TOTAL BILIRUBIN mg/dL 0 51   ALK PHOS U/L 85   ALT U/L 16   AST U/L 18   GLUCOSE RANDOM mg/dL 105     Results from last 7 days   Lab Units 02/23/23  1855   INR  0 95                   Lines/Drains:  Invasive Devices     Peripheral Intravenous Line  Duration           Peripheral IV 02/23/23 Right Antecubital <1 day                    Imaging: Personally reviewed the following imaging: wrist XR with distal communiuted fractures of left ulna and radius   XR wrist 3+ views LEFT   ED Interpretation by Cr Bethea DO (02/23 1915)   Abnormal   Acute, comminuted, displaced, and distal radius and ulna fracture as interpreted by me independently       XR forearm 2 views LEFT    (Results Pending)       EKG and Other Studies Reviewed on Admission:   · EKG: ordered for AM     ** Please Note: This note has been constructed using a voice recognition system   **

## 2023-02-24 NOTE — PLAN OF CARE
Problem: METABOLIC, FLUID AND ELECTROLYTES - ADULT  Goal: Electrolytes maintained within normal limits  Description: INTERVENTIONS:  - Monitor labs and assess patient for signs and symptoms of electrolyte imbalances  - Administer electrolyte replacement as ordered  - Monitor response to electrolyte replacements, including repeat lab results as appropriate  - Instruct patient on fluid and nutrition as appropriate  Outcome: Progressing  Goal: Fluid balance maintained  Description: INTERVENTIONS:  - Monitor labs   - Monitor I/O and WT  - Instruct patient on fluid and nutrition as appropriate  - Assess for signs & symptoms of volume excess or deficit  Outcome: Progressing  Goal: Glucose maintained within target range  Description: INTERVENTIONS:  - Monitor Blood Glucose as ordered  - Assess for signs and symptoms of hyperglycemia and hypoglycemia  - Administer ordered medications to maintain glucose within target range  - Assess nutritional intake and initiate nutrition service referral as needed  Outcome: Progressing     Problem: MUSCULOSKELETAL - ADULT  Goal: Maintain or return mobility to safest level of function  Description: INTERVENTIONS:  - Assess patient's ability to carry out ADLs; assess patient's baseline for ADL function and identify physical deficits which impact ability to perform ADLs (bathing, care of mouth/teeth, toileting, grooming, dressing, etc )  - Assess/evaluate cause of self-care deficits   - Assess range of motion  - Assess patient's mobility  - Assess patient's need for assistive devices and provide as appropriate  - Encourage maximum independence but intervene and supervise when necessary  - Involve family in performance of ADLs  - Assess for home care needs following discharge   - Consider OT consult to assist with ADL evaluation and planning for discharge  - Provide patient education as appropriate  Outcome: Progressing  Goal: Maintain proper alignment of affected body part  Description: INTERVENTIONS:  - Support, maintain and protect limb and body alignment  - Provide patient/ family with appropriate education  Outcome: Progressing     Problem: PAIN - ADULT  Goal: Verbalizes/displays adequate comfort level or baseline comfort level  Description: Interventions:  - Encourage patient to monitor pain and request assistance  - Assess pain using appropriate pain scale  - Administer analgesics based on type and severity of pain and evaluate response  - Implement non-pharmacological measures as appropriate and evaluate response  - Consider cultural and social influences on pain and pain management  - Notify physician/advanced practitioner if interventions unsuccessful or patient reports new pain  Outcome: Progressing

## 2023-02-24 NOTE — DISCHARGE INSTR - AVS FIRST PAGE
Discharge Instructions - Orthopedics  Anjelica Jacobson 75 y o  female MRN: [id number]  Unit/Bed#: U 05    Weight Bearing Status:                                           Do not attempt to lift anything with your left hand  Do not move your wrist excessively  You may gently move your fingers  You may remove your arm from the sling as desired  DVT prophylaxis  None is necessary for this procedure  Continue any previous blood thinners as you usually would take them  Pain:  Take over-the-counter Tylenol as directed on the label to control mild to moderate pain  Save the oxycodone for severe pain  Ice may be utilized to control swelling as needed and as desired  Dressing Instructions:   Please keep clean, dry and intact until follow up     Appt Instructions: If you do not have your appointment, please call the clinic at [phone]  Follow-up should be with Dr Bertha Mack in 2 weeks  Otherwise followup as scheduled     Contact the office sooner if you experience any increased numbness/tingling in the extremities        Miscellaneous:  None

## 2023-02-24 NOTE — CONSULTS
Consultation - Orthopedics   Ricky Cheung 75 y o  female MRN: [id number]  Unit/Bed#: -01 Encounter: [id number]      Assessment/Plan     Assessment:  Type 1 open left comminuted intraarticular distal radius/ulna fractures    Plan:  Patient admitted to medical service  Plan for OR this morning with Dr Romero Friend for left distal radius/ulna I&D with ORIF  Informed consent obtained  NPO  Pain control as needed  She has been receiving ancef 2 g q 8 hours since admission due to open fracture  Continue splint for now until OR  History of Present Illness   Physician Requesting Consult: Rock Oliveira*  Reason for Consult / Principal Problem: left wrist pain  HPI: Ricky Cheung is a 75y o  year old female who is left hand dominant presents with left wrist pain with small open wound  Injury occurred last night in her home  She states she was cooking dinner and a steak knife dropped to the ground  Her puppy ran off with the steak knife and the patient was concerned the dog was going to get hurt  She ran after the dog and slipped and fell on her outstretched left hand  She felt immediate pain and noticed an open wound  She came to the emergency room and x-rays revealed displaced distal radius and ulna fractures  There was a less than 1 cm wound over the distal ulna  Area was cleaned well and sugar-tong splint was applied  Patient was admitted to the medical service and orthopedics was consulted  She was made n p o  at midnight for anticipated surgery this morning  She denies any prior injury to that wrist     Consult to orthopedics  Consult performed by: Romario Ledbetter PA-C  Consult ordered by: Ellie Kimbrough DO          Review of Systems   Constitutional: Negative  HENT: Negative  Eyes: Negative  Respiratory: Negative  Cardiovascular: Negative  Gastrointestinal: Negative  Endocrine: Negative  Genitourinary: Negative      Musculoskeletal: Positive for arthralgias and joint swelling  Skin: Negative  Allergic/Immunologic: Negative  Hematological: Negative  Psychiatric/Behavioral: Negative  Historical Information   Past Medical History:   Diagnosis Date   • Anxiety    • Morales esophagus    • Breast cancer ([facility] ) 09/24/2020    right mucinous Ca   • Colon polyp    • Gastroesophageal reflux disease without esophagitis 5/14/2019   • GERD (gastroesophageal reflux disease)    • History of gallstones    • Hypertension    • Personal history of colonic polyps 5/14/2019   • Primary biliary cirrhosis ([facility] ) 5/14/2019   • Seasonal allergies      Past Surgical History:   Procedure Laterality Date   • APPENDECTOMY     • BREAST BIOPSY Right 09/24/2020    stereo-  mucinous ca   • BREAST BIOPSY Left 10/06/2020    us bx- neg   • BREAST LUMPECTOMY Right 12/22/2020    Procedure: BREAST NEEDLE LOCALIZED LUMPECTOMY (NEEDLE LOC AT 1230); Surgeon: Ladan Verdugo MD;  [facility];  Service: Surgical Oncology   • COLONOSCOPY  06/24/2020     2 adenomas  Five year recall advised   • INTRAOPERATIVE RADIATION THERAPY (IORT) Right 12/22/2020    Procedure: BREAST INTRAOPERATIVE RADIATION THERAPY (IORT) BY DR Chinyere Harrington;  Surgeon: Ladan Verdugo MD;  [facility];  Service: Surgical Oncology   • LYMPH NODE BIOPSY Right 12/22/2020    Procedure: SENTINEL LYMPH NODE BIOPSY; LYMPHATIC MAPPING WITH BLUE DYE AND RADIOACTIVE DYE (INJECT AT 1400 BY DR KNOX IN THE OR); Surgeon: Ladan Verdugo MD;  [facility];  Service: Surgical Oncology   • MAMMO NEEDLE LOCALIZATION RIGHT (ALL INC) Right 12/22/2020   • MAMMO STEREOTACTIC BREAST BIOPSY RIGHT (ALL INC) Right 09/24/2020   • TUBAL LIGATION     • UPPER GASTROINTESTINAL ENDOSCOPY  06/24/2019    irregular z-line    biopsies negative for Morales's     Social History   Social History     Substance and Sexual Activity   Alcohol Use Yes     Social History     Substance and Sexual Activity   Drug Use Never     E-Cigarette/Vaping   • E-Cigarette Use Never User      E-Cigarette/Vaping Substances     Social History     Tobacco Use   Smoking Status Never   Smokeless Tobacco Never     Family History: non-contributory    Meds/Allergies   all current active meds have been reviewed  Allergies   Allergen Reactions   • Aspirin GI Intolerance       Objective   Vitals: Blood pressure 133/82, pulse 75, temperature 97 5 °F (36 4 °C), temperature source Temporal, resp  rate 18, height 5' 7" (1 702 m), weight 86 2 kg (190 lb), SpO2 96 %  ,Body mass index is 29 76 kg/m²  No intake or output data in the 24 hours ending 02/24/23 0700  No intake/output data recorded  Invasive Devices     Peripheral Intravenous Line  Duration           Peripheral IV 02/23/23 Right Antecubital <1 day                Physical Exam  Vitals and nursing note reviewed  Constitutional:       General: She is not in acute distress  Appearance: She is well-developed  HENT:      Head: Normocephalic and atraumatic  Eyes:      Conjunctiva/sclera: Conjunctivae normal    Cardiovascular:      Rate and Rhythm: Normal rate and regular rhythm  Heart sounds: No murmur heard  Pulmonary:      Effort: Pulmonary effort is normal  No respiratory distress  Breath sounds: Normal breath sounds  Abdominal:      Palpations: Abdomen is soft  Tenderness: There is no abdominal tenderness  Musculoskeletal:         General: Swelling and signs of injury present  Cervical back: Neck supple  Skin:     General: Skin is warm and dry  Capillary Refill: Capillary refill takes less than 2 seconds  Neurological:      Mental Status: She is alert and oriented to person, place, and time     Psychiatric:         Mood and Affect: Mood normal        Left Hand Exam     Other   Sensation: normal    Comments:  Sugar tong splint intact  Able to move all fingers limitedly secondary to pain  Swelling in all fingers  Capillary refill brisk            Lab Results: I have personally reviewed pertinent lab results    Imaging Studies: I have personally reviewed pertinent films in PACS  X-ray left wrist: Comminuted intra-articular distal radius and ulna fractures

## 2023-02-24 NOTE — ASSESSMENT & PLAN NOTE
• S/p mechanical fall on to outstretched left hand  • Left hand dominant   • Ortho to take to OR 2/24 at 0730   • Pain control with oxy, tylenol, dilaudid, and robaxin   • NV distally on admission - continue NV checks q4h overnight

## 2023-02-24 NOTE — CASE MANAGEMENT
Case Management Assessment & Discharge Planning Note    Patient name Nash Boeck  Location /-03 MRN [id number]  : 1955 Date 2023       Current Admission Date: 2023  Current Admission Diagnosis:Fracture of distal radius and ulna, left, open type I or II, initial encounter   Patient Active Problem List    Diagnosis Date Noted   • Fracture of distal radius and ulna, left, open type I or II, initial encounter 2023   • Hypokalemia 2023   • Anxiety 2022   • Depression 2022   • COVID-19 2021   • Use of anastrozole (Arimidex) 2021   • Malignant neoplasm of upper-outer quadrant of right breast in female, estrogen receptor positive ([facility] ) 2020   • Personal history of colonic polyps 2019   • Gastroesophageal reflux disease without esophagitis 2019   • Primary biliary cirrhosis ([facility] ) 2019   • Morales's esophagus without dysplasia 2019      LOS (days): 0  Geometric Mean LOS (GMLOS) (days):   Days to GMLOS:     OBJECTIVE:              Current admission status: Observation       Preferred [facility]acy:   [id number] [address] [place] [id number]  Phone: [phone] Fax: [phone]    Primary Care Provider: Desean Ballard DO    Primary Insurance: [place] [employer]  Secondary Insurance:     ASSESSMENT:  Shanika Huntley Proxies    There are no active Health Care Proxies on file         Advance Directives  Does patient have a [address] [place] [address] Avenue?: No  Was patient offered paperwork?: Yes (Patient declined)  Does patient currently have a Health Care decision maker?: No  Does patient have Advance Directives?: No  Was patient offered paperwork?:  (Patient declined)  Primary Contact: Spouse, Pavan Rivera         Readmission Root Cause  30 Day Readmission: No    Patient Information  Admitted from[de-identified] Home  Mental Status: Alert  During Assessment patient was accompanied by: Not accompanied during assessment  Primary Caregiver: Self  Support Systems: Self, Family members, Son, Spouse/significant other  [place] of Residence: [address] [facility] do you live in?: [address] [facility] entry access options   Select all that apply : Stairs  Number of steps to enter home : 2  Do the steps have railings?: Yes  Type of Current Residence: 2 story home  Upon entering residence, is there a bedroom on the main floor (no further steps)?: No  A bedroom is located on the following floor levels of residence (select all that apply):: 2nd Floor  Upon entering residence, is there a bathroom on the main floor (no further steps)?: Yes  Number of steps to 2nd floor from main floor: One Flight  In the last 12 months, was there a time when you were not able to pay the mortgage or rent on time?: No  In the last 12 months, how many places have you lived?: 1  In the last 12 months, was there a time when you did not have a steady place to sleep or slept in a shelter (including now)?: No  Homeless/housing insecurity resource given?: N/A  Living Arrangements: Lives w/ Spouse/significant other    Activities of Daily Living Prior to Admission  Functional Status: Independent  Completes ADLs independently?: Yes  Ambulates independently?: Yes  Does patient use assisted devices?: No  Does patient currently own DME?: No  Does patient have a history of Outpatient Therapy (PT/OT)?: No  Does the patient have a history of Short-Term Rehab?: No  Does patient have a history of HH?: No  Does patient currently have [place] [address]?: No         Patient Information Continued  Income Source: Pension/longterm  Does patient have prescription coverage?: Yes ([facility] [place])  Within the past 12 months, you worried that your food would run out before you got the money to buy more : Never true  Within the past 12 months, the food you bought just didn't last and you didn't have money to get more : Never true  Food insecurity resource given?: N/A  Does patient receive dialysis treatments?: No  Does patient have a history of substance abuse?: No  Does patient have a history of Mental Health Diagnosis?: No         Means of Transportation  Means of Transport to Appts[de-identified] Drives Self  In the past 12 months, has lack of transportation kept you from medical appointments or from getting medications?: No  In the past 12 months, has lack of transportation kept you from meetings, work, or from getting things needed for daily living?: No  Was application for public transport provided?: N/A        DISCHARGE DETAILS:    Discharge planning discussed with[de-identified] Patient  Freedom of Choice: Yes     CM contacted family/caregiver?: No- see comments (Patient reported she will contact spouse)  Were Treatment Team discharge recommendations reviewed with patient/caregiver?: Yes  Did patient/caregiver verbalize understanding of patient care needs?: Yes  Were patient/caregiver advised of the risks associated with not following Treatment Team discharge recommendations?: Yes         [address]         Is the patient interested in [place] [address] at discharge?: No    DME Referral Provided  Referral made for DME?: No              Treatment Team Recommendation: Home  Discharge Destination Plan[de-identified] Home  Transport at Discharge : Family        Additional Comments: Met with patient to discuss role of CM and discuss any needs prior to discharge  Patient resides in a 2 story home with 2 [address] and 1 flight of stairs to get to bedroom on 2nd floor  Patient lives with spouse and children  PTA patient was independent  No Hx STR, OUTpatient therapy or HHC  Patient is not vaccinated for Covid  Patient may need Outpatient therapy for left arm fx as this is dominant arm  Patient does not own any DME  Spouse will transport home at discharge

## 2023-02-24 NOTE — ASSESSMENT & PLAN NOTE
· S/p mechanical fall on to outstretched left hand  · Left hand dominant   · Ortho to take to OR 2/24 at 0730   · Pain control with oxy, tylenol, dilaudid, and robaxin   · NV distally on admission - continue NV checks q4h overnight

## 2023-02-24 NOTE — ANESTHESIA PROCEDURE NOTES
Peripheral Block    Patient location during procedure: holding area  Start time: 2/24/2023 7:20 AM  Reason for block: at surgeon's request and post-op pain management  Staffing  Performed: Anesthesiologist   Anesthesiologist: Davonte Hu MD  Preanesthetic Checklist  Completed: patient identified, IV checked, site marked, risks and benefits discussed, surgical consent, monitors and equipment checked, pre-op evaluation and timeout performed  Peripheral Block  Patient position: supine  Prep: ChloraPrep  Patient monitoring: continuous pulse ox and frequent blood pressure checks  Block type: supraclavicular  Laterality: left  Injection technique: single-shot  Procedures: ultrasound guided, Ultrasound guidance required for the procedure to increase accuracy and safety of medication placement and decrease risk of complications    Ultrasound permanent image saved  Needle  Needle type: Stimuplex   Needle gauge: 22 G  Needle length: 5 cm  Needle localization: ultrasound guidance  Test dose: negative  Assessment  Injection assessment: incremental injection and local visualized surrounding nerve on ultrasound  Paresthesia pain: none  Heart rate change: no  Slow fractionated injection: yes  Post-procedure:  site cleaned  patient tolerated the procedure well with no immediate complications

## 2023-02-24 NOTE — ASSESSMENT & PLAN NOTE
· K at 3 2 - given 40PO   · Check Mg - replete if needed   · Placed on D5 - 1/2NS - 20KCl IVF while NPO

## 2023-02-24 NOTE — UTILIZATION REVIEW
Initial Clinical Review    Admission: Date/Time/Statement:   Admission Orders (From admission, onward)     Ordered        02/23/23 2020  Place in Observation  Once                      Orders Placed This Encounter   Procedures   • Place in Observation     Standing Status:   Standing     Number of Occurrences:   1     Order Specific Question:   Level of Care     Answer:   Med Surg [16]     ED Arrival Information     Expected   -    Arrival   2/23/2023 18:33    Acuity   Emergent            Means of arrival   Ambulance    Escorted by   Sushma James)    Service   Hospitalist    Admission type   Emergency            Arrival complaint   Broke lt wrist            Chief Complaint   Patient presents with   • Wrist Problem     Pt arrives from home with open left wrist injury  States she fell tonight, denies headstrike, - thinners  Initial Presentation: 75 y o  female presents to ED via  EMS  From home after a  Fall and sustained a  Left wrist fracture  Cooking at home,  Dropped a knife, puppy  Picked up knife, patient tripped trying to get knife  Edward Nahun and caught herself with left hand, had immediate pain  Denies head strike or  LOC  PMH  Is breast cancer, S/P  Lumpectomy and on anastrozole, anxiety and Morales's  Esophagus  Admit  Observation  With  Fracture left radius and ulna, hypokalemia and plan is  Surgical intervention, pain control, neurovascular checks  Q 4 hrs and continue home meds  SURGERY DATE: 2/24/2023  Procedure(s):  Left - OPEN REDUCTION W/ INTERNAL FIXATION (ORIF) RADIUS / ULNA (WRIST)   irrigation and debridement    Operative Findings:  Type I open distal with extension into the radial shaft       ED Triage Vitals   Temperature Pulse Respirations Blood Pressure SpO2   02/23/23 1846 02/23/23 1846 02/23/23 1846 02/23/23 1846 02/23/23 1846   98 3 °F (36 8 °C) 58 18 96/58 94 %      Temp Source Heart Rate Source Patient Position - Orthostatic VS BP Location FiO2 (%)   02/23/23 1846 02/23/23 1846 02/23/23 1846 02/23/23 1846 02/24/23 0914   Oral Monitor Sitting Right arm 100      Pain Score       02/23/23 1856       10 - Worst Possible Pain          Wt Readings from Last 1 Encounters:   02/24/23 86 2 kg (190 lb)     Additional Vital Signs:   02/24/23 0914 97 2 °F (36 2 °C) Abnormal  97 18 149/84 -- 98 % 100 -- 6 L/min -- Simple mask --   02/24/23 0909 97 2 °F (36 2 °C) Abnormal  104 13 149/84 111 96 % -- -- -- -- Simple mask --   02/24/23 0704 98 4 °F (36 9 °C) 68 22 175/91 Abnormal  -- 95 % -- -- -- -- None (Room air) --   02/24/23 0300 -- -- -- -- -- 96 % -- -- -- -- None (Room air) --   02/24/23 02:54:27 97 5 °F (36 4 °C) 75 18 133/82 99 96 % -- -- -- -- -- Lying   02/24/23 0238 -- 82 16 137/83 -- 95 % -- -- -- -- -- --   02/23/23 2230 -- 79 18 110/69 87 95 % -- -- -- -- None (Room air) Sitting   02/23/23 2130 -- 72 18 117/66 86 95 % -- -- -- -- None (Room air) Sitting   02/23/23 2100 -- 78 18 117/60 83 100 % -- -- -- -- None (Room air) Sitting   02/23/23 2000 -- 75 18 114/61 81 100 % -- -- -- -- Nasal cannula Sitting   02/23/23 1930 -- 73 18 108/57 77 98 % -- -- -- -- Nasal cannula Sitting   02/23/23 1915 -- 74 18 93/51 68 97 % -- 28 -- 2 L/min Nasal cannula Sitting   02/23/23 1846 98 3 °F (36 8 °C) 58 18 96/58 -- 94 % -- -- -- -- None (Room         Pertinent Labs/Diagnostic Test Results:   XR wrist 3+ views LEFT   ED Interpretation by Lexy Kwan DO (02/23 1915)   Abnormal   Acute, comminuted, displaced, and distal radius and ulna fracture as interpreted by me independently       XR forearm 2 views LEFT    (Results Pending)   XR wrist 3+ vw left    (Results Pending)         Results from last 7 days   Lab Units 02/24/23  0528 02/23/23  1855   WBC Thousand/uL 9 05 9 80   HEMOGLOBIN g/dL 12 4 12 6   HEMATOCRIT % 36 3 37 8   PLATELETS Thousands/uL 281 313   NEUTROS ABS Thousands/µL  --  5 30         Results from last 7 days   Lab Units 02/24/23  0528 02/23/23  1855   SODIUM mmol/L 132* 134* POTASSIUM mmol/L 4 2 3 2*   CHLORIDE mmol/L 101 101   CO2 mmol/L 23 20*   ANION GAP mmol/L 8 13   BUN mg/dL 11 12   CREATININE mg/dL 0 59* 0 65   EGFR ml/min/1 73sq m 94 91   CALCIUM mg/dL 8 3* 8 4   MAGNESIUM mg/dL  --  1 7*     Results from last 7 days   Lab Units 02/23/23  1855   AST U/L 18   ALT U/L 16   ALK PHOS U/L 85   TOTAL PROTEIN g/dL 7 6   ALBUMIN g/dL 3 9   TOTAL BILIRUBIN mg/dL 0 51     Results from last 7 days   Lab Units 02/24/23  0239   POC GLUCOSE mg/dl 116     Results from last 7 days   Lab Units 02/24/23  0528 02/23/23  1855   GLUCOSE RANDOM mg/dL 125 105           Results from last 7 days   Lab Units 02/24/23  0608 02/23/23  1855   PROTIME seconds 13 1 13 4   INR  0 92 0 95   PTT seconds  --  23                 ED Treatment:   Medication Administration from 02/23/2023 1832 to 02/24/2023 0244       Date/Time Order Dose Route Action Comments     02/23/2023 1840 EST fentanyl citrate (PF) (FOR EMS ONLY) 100 mcg/2 mL injection 100 mcg 0 mcg Does not apply Given to EMS --     02/23/2023 1856 EST HYDROmorphone (DILAUDID) injection 1 mg 1 mg Intravenous Given --     02/23/2023 1923 EST ceFAZolin (ANCEF) IVPB (premix in dextrose) 2,000 mg 50 mL 0 mg Intravenous Stopped --     02/23/2023 1856 EST ceFAZolin (ANCEF) IVPB (premix in dextrose) 2,000 mg 50 mL 2,000 mg Intravenous New Bag --     02/23/2023 2202 EST sodium chloride 0 9 % bolus 500 mL 0 mL Intravenous Stopped --     02/23/2023 1855 EST sodium chloride 0 9 % bolus 500 mL 500 mL Intravenous New Bag --     02/23/2023 2108 EST potassium chloride (K-DUR,KLOR-CON) CR tablet 40 mEq 40 mEq Oral Given --     02/23/2023 2109 EST tetanus-diphtheria-acellular pertussis (BOOSTRIX) IM injection 0 5 mL 0 5 mL Intramuscular Given --     02/23/2023 2233 EST acetaminophen (TYLENOL) tablet 975 mg 975 mg Oral Given --     02/24/2023 0236 EST methocarbamol (ROBAXIN) tablet 500 mg 500 mg Oral Given --     02/24/2023 0020 EST oxyCODONE (ROXICODONE) split tablet 2 5 mg 2 5 mg Oral Given --     02/23/2023 2233 EST oxyCODONE (ROXICODONE) split tablet 2 5 mg -- Oral See Alternative --     02/24/2023 0020 EST oxyCODONE (ROXICODONE) IR tablet 5 mg -- Oral See Alternative --     02/23/2023 2233 EST oxyCODONE (ROXICODONE) IR tablet 5 mg 5 mg Oral Given --     02/24/2023 0100 EST ceFAZolin (ANCEF) IVPB (premix in dextrose) 2,000 mg 50 mL 0 mg Intravenous Stopped --     02/24/2023 0030 EST ceFAZolin (ANCEF) IVPB (premix in dextrose) 2,000 mg 50 mL 2,000 mg Intravenous New Bag --     02/24/2023 0240 EST magnesium sulfate 2 g/50 mL IVPB (premix) 2 g 0 g Intravenous Stopped --     02/24/2023 0145 EST magnesium sulfate 2 g/50 mL IVPB (premix) 2 g 2 g Intravenous New Bag --        Present on Admission:  • Fracture of distal radius and ulna, left, open type I or II, initial encounter  • Anxiety  • Morales's esophagus without dysplasia      Admitting Diagnosis: Wrist injury [S69 90XA]  Open fracture of left wrist, initial encounter [S62 102B]  Age/Sex: 75 y o  female  Admission Orders:  Scheduled Medications:  acetaminophen, 975 mg, Oral, Q8H  anastrozole, 1 mg, Oral, Daily  calcium carbonate-vitamin D, 1 tablet, Oral, Daily  cefazolin, 2,000 mg, Intravenous, Q8H  enoxaparin, 40 mg, Subcutaneous, Daily  pantoprazole, 40 mg, Oral, BID AC  sertraline, 100 mg, Oral, Daily  ursodiol, 900 mg, Oral, BID      Continuous IV Infusions:  dextrose 5 % and sodium chloride 0 45 % with KCl 20 mEq/L, 100 mL/hr, Intravenous, Continuous      PRN Meds:  calcium carbonate, 1,000 mg, Oral, Daily PRN  fentaNYL, 25 mcg, Intravenous, Q5 Min PRN  [MAR Hold] HYDROmorphone, 0 2 mg, Intravenous, Q4H PRN  methocarbamol, 500 mg, Oral, Q6H PRN  ondansetron, 4 mg, Intravenous, Q6H PRN  ondansetron, 4 mg, Intravenous, Once PRN  oxyCODONE, 2 5 mg, Oral, Q6H PRN   Or  oxyCODONE, 5 mg, Oral, Q6H PRN  senna, 1 tablet, Oral, HS PRN  zolpidem, 10 mg, Oral, HS PRN        IP CONSULT TO ORTHOPEDIC SURGERY    Network Utilization Review Department  ATTENTION: Please call with any questions or concerns to [phone] and carefully listen to the prompts so that you are directed to the right person  All voicemails are confidential   Antonietta Schmidt all requests for admission clinical reviews, approved or denied determinations and any other requests to dedicated fax number below belonging to the campus where the patient is receiving treatment   List of dedicated fax numbers for the Facilities:  [address] DENIALS (Administrative/Medical Necessity) [phone]   [address] (Maternity/NICU/Pediatrics) [phone]   [address] Alisia Mendez [phone]   [facility] [address] [phone]   [address] [address] [address] Pedro [facility] [phone]   [facility]1 [facility] [address] [facility] [phone]

## 2023-02-24 NOTE — OP NOTE
OPERATIVE REPORT  PATIENT NAME: Perri Laughlin    :  1955  MRN: [id number]  Pt Location:  OR ROOM 02    SURGERY DATE: 2023    Surgeon(s) and Role:     Suri Packer MD - Primary     * Francisca Andrade PA-C - Assisting    Preop Diagnosis:  Colles' fracture of left radius, initial encounter for open fracture type I or II [S52 532B]    Post-Op Diagnosis Codes:     * Colles' fracture of left radius, initial encounter for open fracture type I or II [S52 532B]    Procedure(s):  Left - OPEN REDUCTION W/ INTERNAL FIXATION (ORIF) RADIUS / ULNA (WRIST)  irrigation and dedebridement    Specimen(s):  * No specimens in log *    Estimated Blood Loss:   Minimal    Drains:  * No LDAs found *    Anesthesia Type:   General    Operative Indications:  Colles' fracture of left radius, initial encounter for open fracture type I or II [S52 532B]    Operative Findings:  Type I open distal with extension into the radial shaft    Complications:   None    Procedure and Technique:  After informed surgical consent of been obtained patient was brought to the operating room and transferred to the operating table in the supine position  General anesthesia was obtained  Tourniquet cuff was placed in the upper aspect of the arm and the patient was prepped and draped in normal sterile fashion  The left arm was elevated and the tourniquet was raised  Her compartments were quite soft and other than the laceration over the distal ulna everything was pretty normal as far as her skin and soft tissues were concerned  Using the standard volar approach over the flexor carpi radialis tendon section was taken down through skin and subcutaneous tissues to the level of the FCR then the interval between the FCR and the radial artery were developed  The supinator was reflected off of the radius  Her metaphysis and distal diaphysis were extremely comminuted    This was also an intra-articular split with 3 fragments in the distal fragment of the articular surface of the radius  Using open reduction techniques the distal fragment was reduced to its anatomic position as possible and secured with Brian wires  The Tri-Met plate was then applied to the volar aspect of the radius and secured with locking screws into the distal segment  After this this was reduced to the proximal fragment using bridge technique I spanned over the highly comminuted segments  This was secured with 4 bicortical screws  Final fluoroscopic views were obtained  The laceration over the ulna was opened and dissection was taken down through the skin and subcutaneous tissues to the level of the bone  Using the knife a sharp debridement was performed of the area and there was minimal if any contamination present  This was an excisional debridement  She was then irrigated with normal saline  1 g of powdered vancomycin were placed in the wound  The wounds were then closed with 2-0 Vicryl and 3-0 nylon  Sterile dressings were applied  Patient was then awakened from general anesthesia and transferred recovery stable condition having tolerated the procedure well     I was present for the entire procedure, I was present for all critical portions of the procedure, A qualified resident physician was not available and A physician assistant was required during the procedure for retraction tissue handling,dissection and suturing    Patient Disposition:  PACU         SIGNATURE: Tuan Yi MD  DATE: February 24, 2023  TIME: 8:33 AM

## 2023-02-24 NOTE — ASSESSMENT & PLAN NOTE
· Follows with [facility] heme/onc  · S/p lumpectomy and sentinel node biopsy   · Continue anastrozole

## 2023-02-24 NOTE — ASSESSMENT & PLAN NOTE
• Follows with [facility] heme/onc  • S/p lumpectomy and sentinel node biopsy   • Continue anastrozole

## 2023-02-25 VITALS
TEMPERATURE: 97.7 F | HEIGHT: 67 IN | DIASTOLIC BLOOD PRESSURE: 74 MMHG | WEIGHT: 190 LBS | BODY MASS INDEX: 29.82 KG/M2 | OXYGEN SATURATION: 97 % | RESPIRATION RATE: 16 BRPM | HEART RATE: 73 BPM | SYSTOLIC BLOOD PRESSURE: 128 MMHG

## 2023-02-25 LAB
ALBUMIN SERPL BCP-MCNC: 3.8 G/DL (ref 3.5–5)
ALP SERPL-CCNC: 69 U/L (ref 34–104)
ALT SERPL W P-5'-P-CCNC: 11 U/L (ref 7–52)
ANION GAP SERPL CALCULATED.3IONS-SCNC: 6 MMOL/L (ref 4–13)
AST SERPL W P-5'-P-CCNC: 14 U/L (ref 13–39)
BASOPHILS # BLD AUTO: 0.02 THOUSANDS/ÂΜL (ref 0–0.1)
BASOPHILS NFR BLD AUTO: 0 % (ref 0–1)
BILIRUB SERPL-MCNC: 0.56 MG/DL (ref 0.2–1)
BUN SERPL-MCNC: 15 MG/DL (ref 5–25)
CALCIUM SERPL-MCNC: 8.7 MG/DL (ref 8.4–10.2)
CHLORIDE SERPL-SCNC: 105 MMOL/L (ref 96–108)
CO2 SERPL-SCNC: 25 MMOL/L (ref 21–32)
CREAT SERPL-MCNC: 0.57 MG/DL (ref 0.6–1.3)
EOSINOPHIL # BLD AUTO: 0 THOUSAND/ÂΜL (ref 0–0.61)
EOSINOPHIL NFR BLD AUTO: 0 % (ref 0–6)
ERYTHROCYTE [DISTWIDTH] IN BLOOD BY AUTOMATED COUNT: 12.9 % (ref 11.6–15.1)
GFR SERPL CREATININE-BSD FRML MDRD: 95 ML/MIN/1.73SQ M
GLUCOSE P FAST SERPL-MCNC: 113 MG/DL (ref 65–99)
GLUCOSE SERPL-MCNC: 113 MG/DL (ref 65–140)
HCT VFR BLD AUTO: 34.4 % (ref 34.8–46.1)
HGB BLD-MCNC: 11.5 G/DL (ref 11.5–15.4)
IMM GRANULOCYTES # BLD AUTO: 0.04 THOUSAND/UL (ref 0–0.2)
IMM GRANULOCYTES NFR BLD AUTO: 0 % (ref 0–2)
LYMPHOCYTES # BLD AUTO: 1.79 THOUSANDS/ÂΜL (ref 0.6–4.47)
LYMPHOCYTES NFR BLD AUTO: 17 % (ref 14–44)
MCH RBC QN AUTO: 30.9 PG (ref 26.8–34.3)
MCHC RBC AUTO-ENTMCNC: 33.4 G/DL (ref 31.4–37.4)
MCV RBC AUTO: 93 FL (ref 82–98)
MONOCYTES # BLD AUTO: 0.92 THOUSAND/ÂΜL (ref 0.17–1.22)
MONOCYTES NFR BLD AUTO: 9 % (ref 4–12)
NEUTROPHILS # BLD AUTO: 7.66 THOUSANDS/ÂΜL (ref 1.85–7.62)
NEUTS SEG NFR BLD AUTO: 74 % (ref 43–75)
NRBC BLD AUTO-RTO: 0 /100 WBCS
PLATELET # BLD AUTO: 267 THOUSANDS/UL (ref 149–390)
PMV BLD AUTO: 10.2 FL (ref 8.9–12.7)
POTASSIUM SERPL-SCNC: 4.3 MMOL/L (ref 3.5–5.3)
PROT SERPL-MCNC: 7.1 G/DL (ref 6.4–8.4)
RBC # BLD AUTO: 3.72 MILLION/UL (ref 3.81–5.12)
SODIUM SERPL-SCNC: 136 MMOL/L (ref 135–147)
WBC # BLD AUTO: 10.43 THOUSAND/UL (ref 4.31–10.16)

## 2023-02-25 RX ADMIN — URSODIOL 900 MG: 300 CAPSULE ORAL at 08:50

## 2023-02-25 RX ADMIN — OXYCODONE HYDROCHLORIDE 5 MG: 5 TABLET ORAL at 08:50

## 2023-02-25 RX ADMIN — Medication 1 TABLET: at 08:52

## 2023-02-25 RX ADMIN — PANTOPRAZOLE SODIUM 40 MG: 40 TABLET, DELAYED RELEASE ORAL at 05:04

## 2023-02-25 RX ADMIN — ACETAMINOPHEN 975 MG: 325 TABLET, FILM COATED ORAL at 05:04

## 2023-02-25 RX ADMIN — ENOXAPARIN SODIUM 40 MG: 100 INJECTION SUBCUTANEOUS at 08:52

## 2023-02-25 RX ADMIN — ACETAMINOPHEN 975 MG: 325 TABLET, FILM COATED ORAL at 12:47

## 2023-02-25 RX ADMIN — ANASTROZOLE 1 MG: 1 TABLET, COATED ORAL at 08:51

## 2023-02-25 RX ADMIN — SERTRALINE HYDROCHLORIDE 100 MG: 100 TABLET ORAL at 08:52

## 2023-02-25 NOTE — DISCHARGE SUMMARY
[facility]  Discharge- Albert Zaman 1955, 75 y o  female MRN: [id number]  Unit/Bed#: -01 Encounter: [id number]  Primary Care Provider: Viktor Rothman DO   Date and time admitted to hospital: 2/23/2023  6:37 PM    * Fracture of distal radius and ulna, left, open type I or II, initial encounter  Assessment & Plan  • S/p mechanical fall on to outstretched left hand  • Left hand dominant   • Ortho to take to OR 2/24 at 0730   • Pain control with oxy, tylenol, dilaudid, and robaxin   • NV distally on admission - continue NV checks q4h overnight   • Patient had surgery done 2/24/2023 with open reduction and internal fixation  • PT/OT commence outpatient physiotherapy for the patient  • Per orthopedics, patient stable for discharge to follow-up with Dr Fish Bishop as outpatient  • No weightbearing on the left upper extremity    Hypokalemia  Assessment & Plan  • K at 3 2 - given 40PO   • Check Mg - replete if needed   • Placed on D5 - 1/2NS - 20KCl IVF while NPO     Anxiety  Assessment & Plan  • Euthymic on admission   • Continue home zoloft 100mg     Malignant neoplasm of upper-outer quadrant of right breast in female, estrogen receptor positive ([facility] [address] )  Assessment & Plan  • Follows with [facility]c  • S/p lumpectomy and sentinel node biopsy   • Continue anastrozole     Morales's esophagus without dysplasia  Assessment & Plan  • Continue home protonix 40mg BID       Medical Problems     Resolved Problems  Date Reviewed: 4/20/2022   None       Discharging Physician / Practitioner: Adela Diane MD  PCP: Viktor Rothman DO  Admission Date:   Admission Orders (From admission, onward)     Ordered        02/23/23 2020  Place in Observation  Once                      Discharge Date: 02/25/23    Reason for Admission: Fall on outstretched left hand    Hospital Course:   Albert Zaman is a 75 y o  female patient with past medical history of breast cancer status postlumpectomy, Morales's esophagus, anxiety, who originally presented to the hospital on 2/23/2023 due to fall on outstretched left hand  Patient was found to have a fracture of the distal radius and ulna on the left  She underwent open reduction and internal fixation by orthopedic surgery  Patient's pain is well controlled  Patient is currently clinically stable  Plan is to discharge to follow-up with orthopedic surgery as outpatient  She was evaluated by physical therapy who recommended outpatient physical therapy for the patient  She will also need to follow-up with her primary care physician within one week of discharge to discuss her recent hospitalization  Please see above list of diagnoses and related plan for additional information  Condition at Discharge: stable    Discharge Day Visit / Exam:   Subjective: No fresh complaint    Vitals: Blood Pressure: 128/74 (02/25/23 0733)  Pulse: 73 (02/25/23 0733)  Temperature: 97 7 °F (36 5 °C) (02/25/23 0733)  Temp Source: Temporal (02/24/23 2136)  Respirations: 16 (02/24/23 2136)  Height: 5' 7" (170 2 cm) (02/24/23 0332)  Weight - Scale: 86 2 kg (190 lb) (02/24/23 0332)  SpO2: 97 % (02/25/23 0733)  Exam:   Physical Exam  Vitals and nursing note reviewed  Constitutional:       General: She is not in acute distress  Appearance: She is well-developed  HENT:      Head: Normocephalic and atraumatic  Eyes:      Conjunctiva/sclera: Conjunctivae normal    Cardiovascular:      Rate and Rhythm: Normal rate and regular rhythm  Heart sounds: No murmur heard  Pulmonary:      Effort: Pulmonary effort is normal  No respiratory distress  Breath sounds: Normal breath sounds  Abdominal:      Palpations: Abdomen is soft  Tenderness: There is no abdominal tenderness  Musculoskeletal:         General: No swelling  Cervical back: Neck supple  Comments: Left upper extremity in a clean cast   Skin:     General: Skin is warm and dry  Capillary Refill: Capillary refill takes less than 2 seconds  Neurological:      General: No focal deficit present  Mental Status: She is alert and oriented to person, place, and time  Psychiatric:         Mood and Affect: Mood normal             Discussion with Family: Updated  () via phone  Discharge instructions/Information to patient and family:   See after visit summary for information provided to patient and family  Provisions for Follow-Up Care:  See after visit summary for information related to follow-up care and any pertinent home health orders  Disposition:   Home       Discharge Statement:  I spent 73 minutes discharging the patient  This time was spent on the day of discharge  I had direct contact with the patient on the day of discharge  Greater than 50% of the total time was spent examining patient, answering all patient questions, arranging and discussing plan of care with patient as well as directly providing post-discharge instructions  Additional time then spent on discharge activities  Discharge Medications:  See after visit summary for reconciled discharge medications provided to patient and/or family        **Please Note: This note may have been constructed using a voice recognition system**

## 2023-02-25 NOTE — PROGRESS NOTES
[place]  Progress Note - Anish Alvarado 1955, 75 y o  female MRN: [id number]  Unit/Bed#: -01 Encounter: [id number]  Primary Care Provider: Monserrat Bell DO   Date and time admitted to hospital: 2/23/2023  6:37 PM    * Fracture of distal radius and ulna, left, open type I or II, initial encounter  Assessment & Plan  • S/p mechanical fall on to outstretched left hand  • Left hand dominant   • Ortho to take to OR 2/24 at 0730   • Pain control with oxy, tylenol, dilaudid, and robaxin   • NV distally on admission - continue NV checks q4h overnight   • Patient had surgery done 2/24/2023 with open reduction and internal fixation  • PT/OT pending    Hypokalemia  Assessment & Plan  • K at 3 2 - given 40PO   • Check Mg - replete if needed   • Placed on D5 - 1/2NS - 20KCl IVF while NPO     Anxiety  Assessment & Plan  • Euthymic on admission   • Continue home zoloft 100mg     Malignant neoplasm of upper-outer quadrant of right breast in female, estrogen receptor positive ([facility] [address] )  Assessment & Plan  • Follows with [facility]  • S/p lumpectomy and sentinel node biopsy   • Continue anastrozole     Morales's esophagus without dysplasia  Assessment & Plan  • Continue home protonix 40mg BID         VTE Pharmacologic Prophylaxis: VTE Score: 3 Moderate Risk (Score 3-4) - Pharmacological DVT Prophylaxis Ordered: enoxaparin (Lovenox)  Total Time Spent on Date of Encounter in care of patient: 65 minutes This time was spent on one or more of the following: performing physical exam; counseling and coordination of care; obtaining or reviewing history; documenting in the medical record; reviewing/ordering tests, medications or procedures; communicating with other healthcare professionals and discussing with patient's family/caregivers      Current Length of Stay: 0 day(s)  Current Patient Status: Observation   Certification Statement: The patient will continue to require additional inpatient hospital stay due to Status post open reduction and internal fixation of the left upper extremity  Discharge Plan: Anticipate discharge in 24-48 hrs to rehab facility  Code Status: Level 1 - Full Code    Subjective:   No Fresh complaint    Objective:   Patient looks well clinically    Vitals:   Temp (24hrs), Av 7 °F (36 5 °C), Min:97 2 °F (36 2 °C), Max:98 4 °F (36 9 °C)    Temp:  [97 2 °F (36 2 °C)-98 4 °F (36 9 °C)] 97 5 °F (36 4 °C)  HR:  [] 90  Resp:  [11-22] 16  BP: (108-175)/(60-91) 108/63  SpO2:  [88 %-100 %] 92 %  Body mass index is 29 76 kg/m²  Input and Output Summary (last 24 hours): Intake/Output Summary (Last 24 hours) at 2023  Last data filed at 2023 0848  Gross per 24 hour   Intake 1100 ml   Output 0 ml   Net 1100 ml       Physical Exam:   Physical Exam  Vitals and nursing note reviewed  Constitutional:       General: She is not in acute distress  Appearance: She is well-developed  HENT:      Head: Normocephalic and atraumatic  Eyes:      Conjunctiva/sclera: Conjunctivae normal    Cardiovascular:      Rate and Rhythm: Normal rate and regular rhythm  Heart sounds: No murmur heard  Pulmonary:      Effort: Pulmonary effort is normal  No respiratory distress  Breath sounds: Normal breath sounds  Abdominal:      Palpations: Abdomen is soft  Tenderness: There is no abdominal tenderness  Musculoskeletal:         General: No swelling  Cervical back: Neck supple  Comments: Clean cast noted on the left upper extremity   Skin:     General: Skin is warm  Capillary Refill: Capillary refill takes less than 2 seconds  Neurological:      General: No focal deficit present  Mental Status: She is alert and oriented to person, place, and time     Psychiatric:         Mood and Affect: Mood normal             Additional Data:     Labs:  Results from last 7 days   Lab Units 23  0528 23  1855   WBC Thousand/uL 9 05 9  80   HEMOGLOBIN g/dL 12 4 12 6   HEMATOCRIT % 36 3 37 8   PLATELETS Thousands/uL 281 313   NEUTROS PCT %  --  55   LYMPHS PCT %  --  37   MONOS PCT %  --  6   EOS PCT %  --  1     Results from last 7 days   Lab Units 02/24/23  0528 02/23/23  1855   SODIUM mmol/L 132* 134*   POTASSIUM mmol/L 4 2 3 2*   CHLORIDE mmol/L 101 101   CO2 mmol/L 23 20*   BUN mg/dL 11 12   CREATININE mg/dL 0 59* 0 65   ANION GAP mmol/L 8 13   CALCIUM mg/dL 8 3* 8 4   ALBUMIN g/dL  --  3 9   TOTAL BILIRUBIN mg/dL  --  0 51   ALK PHOS U/L  --  85   ALT U/L  --  16   AST U/L  --  18   GLUCOSE RANDOM mg/dL 125 105     Results from last 7 days   Lab Units 02/24/23  0608   INR  0 92     Results from last 7 days   Lab Units 02/24/23  0239   POC GLUCOSE mg/dl 116               Lines/Drains:  Invasive Devices     Peripheral Intravenous Line  Duration           Peripheral IV 02/24/23 Right;Ventral (anterior) Forearm <1 day                      Recent Cultures (last 7 days):         Last 24 Hours Medication List:   Current Facility-Administered Medications   Medication Dose Route Frequency Provider Last Rate   • acetaminophen  975 mg Oral Q8H Keira Torrez PA-C     • anastrozole  1 mg Oral Daily Yaneth Acosta PA-C     • calcium carbonate  1,000 mg Oral Daily PRN Yaneth Acosta PA-C     • calcium carbonate-vitamin D  1 tablet Oral Daily Yaneth Acosta PA-C     • cefazolin  2,000 mg Intravenous Q8H Keira Torrez PA-C 2,000 mg (02/24/23 1620)   • dextrose 5 % and sodium chloride 0 45 % with KCl 20 mEq/L  100 mL/hr Intravenous Continuous Keira Torrez PA-C 100 mL/hr (02/24/23 0316)   • enoxaparin  40 mg Subcutaneous Daily Keira Torrez PA-C     • HYDROmorphone  0 2 mg Intravenous Q4H PRN Yaneth Acosta PA-C     • methocarbamol  500 mg Oral Q6H PRN Yaneth Acosta PA-C     • ondansetron  4 mg Intravenous Q6H PRN Yaneth Acosta PA-C     • oxyCODONE  2 5 mg Oral Q6H PRN Yaneth Acosta PA-C      Or   • oxyCODONE  5 mg Oral Q6H PRN Hortencia Long PA-C     • pantoprazole  40 mg Oral BID AC Keira Torrez PA-C     • senna  1 tablet Oral HS PRN Hortencia Long PA-C     • sertraline  100 mg Oral Daily Keira Torrez PA-C     • ursodiol  900 mg Oral BID Hortencia Long PA-C     • zolpidem  10 mg Oral HS PRN Hortencia Long PA-C          Today, Patient Was Seen By: Yevgeniy Lindquist MD    **Please Note: This note may have been constructed using a voice recognition system  **

## 2023-02-25 NOTE — PLAN OF CARE
Problem: METABOLIC, FLUID AND ELECTROLYTES - ADULT  Goal: Electrolytes maintained within normal limits  Description: INTERVENTIONS:  - Monitor labs and assess patient for signs and symptoms of electrolyte imbalances  - Administer electrolyte replacement as ordered  - Monitor response to electrolyte replacements, including repeat lab results as appropriate  - Instruct patient on fluid and nutrition as appropriate  Outcome: Progressing     Problem: MUSCULOSKELETAL - ADULT  Goal: Maintain or return mobility to safest level of function  Description: INTERVENTIONS:  - Assess patient's ability to carry out ADLs; assess patient's baseline for ADL function and identify physical deficits which impact ability to perform ADLs (bathing, care of mouth/teeth, toileting, grooming, dressing, etc )  - Assess/evaluate cause of self-care deficits   - Assess range of motion  - Assess patient's mobility  - Assess patient's need for assistive devices and provide as appropriate  - Encourage maximum independence but intervene and supervise when necessary  - Involve family in performance of ADLs  - Assess for home care needs following discharge   - Consider OT consult to assist with ADL evaluation and planning for discharge  - Provide patient education as appropriate  Outcome: Progressing     Problem: PAIN - ADULT  Goal: Verbalizes/displays adequate comfort level or baseline comfort level  Description: Interventions:  - Encourage patient to monitor pain and request assistance  - Assess pain using appropriate pain scale  - Administer analgesics based on type and severity of pain and evaluate response  - Implement non-pharmacological measures as appropriate and evaluate response  - Consider cultural and social influences on pain and pain management  - Notify physician/advanced practitioner if interventions unsuccessful or patient reports new pain  Outcome: Progressing

## 2023-02-25 NOTE — PHYSICAL THERAPY NOTE
PHYSICAL THERAPY EVAL  Physical Therapy Evaluation    Performed at least 2 patient identifiers during session:  Patient Active Problem List   Diagnosis    Personal history of colonic polyps    Gastroesophageal reflux disease without esophagitis    Primary biliary cirrhosis (HCC)    Morales's esophagus without dysplasia    Malignant neoplasm of upper-outer quadrant of right breast in female, estrogen receptor positive ([facility] )    Use of anastrozole (Arimidex)    COVID-19    Anxiety    Depression    Fracture of distal radius and ulna, left, open type I or II, initial encounter    Hypokalemia       Past Medical History:   Diagnosis Date    Anxiety     Morales esophagus     Breast cancer ([facility] ) 09/24/2020    right mucinous Ca    Colon polyp     Gastroesophageal reflux disease without esophagitis 5/14/2019    GERD (gastroesophageal reflux disease)     History of gallstones     Hypertension     Personal history of colonic polyps 5/14/2019    Primary biliary cirrhosis ([place] [address] ) 5/14/2019    Seasonal allergies        Past Surgical History:   Procedure Laterality Date    APPENDECTOMY      BREAST BIOPSY Right 09/24/2020    stereo-  mucinous ca    BREAST BIOPSY Left 10/06/2020    us bx- neg    BREAST LUMPECTOMY Right 12/22/2020    Procedure: BREAST NEEDLE LOCALIZED LUMPECTOMY (NEEDLE LOC AT 1230); Surgeon: Prabhakar Jonas MD;  [facility];  Service: Surgical Oncology    COLONOSCOPY  06/24/2020     2 adenomas  Five year recall advised    INTRAOPERATIVE RADIATION THERAPY (IORT) Right 12/22/2020    Procedure: BREAST INTRAOPERATIVE RADIATION THERAPY (IORT) BY DR Shravan Anderson;  Surgeon: Prabhakar Jonas MD;  [facility];  Service: Surgical Oncology    LYMPH NODE BIOPSY Right 12/22/2020    Procedure: SENTINEL LYMPH NODE BIOPSY; LYMPHATIC MAPPING WITH BLUE DYE AND RADIOACTIVE DYE (INJECT AT 1400 BY DR KNOX IN THE OR);   Surgeon: Prabhakar Jonas MD;  [facility];  Service: Surgical Oncology    MAMMO NEEDLE LOCALIZATION RIGHT (ALL INC) Right 12/22/2020    MAMMO STEREOTACTIC BREAST BIOPSY RIGHT (ALL INC) Right 09/24/2020    TUBAL LIGATION      UPPER GASTROINTESTINAL ENDOSCOPY  06/24/2019    irregular z-line  biopsies negative for Morales's          02/25/23 0915   PT Last Visit   PT Visit Date 02/25/23   Note Type   Note type Evaluation   Pain Assessment   Pain Assessment Tool 0-10   Pain Score 6   Pain Location/Orientation Orientation: Left; Location: Arm   Hospital Pain Intervention(s) Medication (See MAR); Repositioned   Restrictions/Precautions   Weight Bearing Precautions Per Order Yes   LUE Weight Bearing Per Order NWB   Braces or Orthoses Sling   Other Precautions Pain; Fall Risk;WBS   Home Living   Type of [address] [place] [address] Two level;1/2 bath on main level;Bed/bath upstairs  (2STE)   Bathroom Shower/Tub Walk-in shower   Bathroom Toilet Standard   Additional Comments no DME   Prior Function   Level of [place] Independent with ADLs; Independent with functional mobility; Independent with IADLS   Lives With Spouse  (2 sons (6 and 15 y/o))   IADLs Independent with driving; Independent with meal prep; Independent with medication management   Falls in the last 6 months 1 to 4   Vocational Retired   General   Family/Caregiver Present No   Cognition   Overall Cognitive Status WFL   Arousal/Participation Alert   Orientation Level Oriented X4   Memory Within functional limits   Following Commands Follows all commands and directions without difficulty   Subjective   Subjective "I've been walking to and from the bathroom "   RLE Assessment   RLE Assessment WFL   LLE Assessment   LLE Assessment WFL   Bed Mobility   Supine to Sit 7  Independent   Additional Comments While seated edge of bed, sling adjusted and patient educated on proper fit     Transfers   Sit to Stand 6  Modified independent   Additional items Armrests   Stand to Sit 6  Modified independent Additional items Armrests   Ambulation/Elevation   Gait pattern WNL   Gait Assistance 7  Independent   Assistive Device None   Distance 500+   Balance   Static Sitting Normal   Dynamic Sitting Normal   Static Standing Normal   Dynamic Standing Normal   Ambulatory Normal   Endurance Deficit   Endurance Deficit No   Activity Tolerance   Activity Tolerance Patient tolerated treatment well   Nurse Made Aware Audie PEREZ   Assessment   Prognosis Good   Problem List Decreased range of motion;Obesity;Orthopedic restrictions;Pain   Assessment Patient is a 66y/o F who presented to the ED after a fall  Found to have a fracture of the L distal radius and ulna now POD 1 ORIF  Patient also with hypokalemia  Patient resides with family in a [facility] with steps to enter  She was independent prior to admission and does not use an AD  Current medical status includes obesity, sling, NWB status, pain, fall risk  Patient tolerated session well  Sling adjusted and patient educated on proper fit  Also educated on NWB status  Patient performed all mobility at a Max/I level  She ambulated the entire unit with steady gait  Patient has no further inpatient P T  needs  Advised patient to follow up with surgeon regarding when to start outpatient P T  Will discharge orders  The patient's AM-PAC Basic Mobility Inpatient Short Form Raw Score is 24  A Raw score of greater than 17suggests the patient may benefit from discharge to home  Please also refer to the recommendation of the Physical Therapist for safe discharge planning  Barriers to Discharge None   Goals   Patient Goals To go home today  Plan   Treatment/Interventions   (D/C P T )   Recommendation   PT Discharge Recommendation Home with outpatient rehabilitation  (Outpatient P  T  when directed by surgeon)   AM-PAC Basic Mobility Inpatient   Turning in Flat Bed Without Bedrails 4   Lying on Back to Sitting on Edge of Flat Bed Without Bedrails 4   Moving Bed to Chair 4   Standing Up From Chair Using Arms 4   Walk in Room 4   Climb 3-5 Stairs With Railing 4   Basic Mobility Inpatient Raw Score 24   Basic Mobility Standardized Score 57 68   Highest Level Of Mobility   JH-HLM Goal 8: Walk 250 feet or more   JH-HLM Achieved 8: Walk 250 feet ot more   End of Consult   Patient Position at End of Consult Bedside chair; All needs within reach     Pam Sr, PT             Patient Name: Leandra Skinner Date: 2/25/2023

## 2023-02-25 NOTE — PROGRESS NOTES
Progress Note - Orthopedics   Kumar Connell 75 y o  female MRN: [id number]  Unit/Bed#: -01 Encounter: [id number]    Assessment:  POD 1 s/p I&D, ORIF type 1 open L distal radius fracture    Plan:  NWB LUE  Pain control prn  Lovenox, SCDs for DVT prophylaxis  Maintain drsg, sling LUE  Ice, elevate  Ancef completed  D/c planning- ortho stable, f/u with Dr Van Ohara    Subjective: patient seen and examined by me  Admits some decreased sensation thumb and index fingers  Admits some pain volar aspect L wrist   Denies SOB, abdominal pain, lightheadedness, dizziness, BM  Admits flatus  Vitals: Blood pressure 128/74, pulse 73, temperature 97 7 °F (36 5 °C), resp  rate 16, height 5' 7" (1 702 m), weight 86 2 kg (190 lb), SpO2 97 %  ,Body mass index is 29 76 kg/m²        Intake/Output Summary (Last 24 hours) at 2/25/2023 1333  Last data filed at 2/25/2023 0814  Gross per 24 hour   Intake 1540 ml   Output 400 ml   Net 1140 ml       Invasive Devices     Peripheral Intravenous Line  Duration           Peripheral IV 02/24/23 Right;Ventral (anterior) Forearm <1 day                Ortho Exam:   LUE:  Median/radial/ulnar nerves motor and sensory intact, AIN/PIN motor intact, brisk capillary refill  Splint: C/D/I, fingers with mild to moderate swelling      Lab, Imaging and other studies:   CBC:   Lab Results   Component Value Date    WBC 10 43 (H) 02/25/2023    HGB 11 5 02/25/2023    HCT 34 4 (L) 02/25/2023    MCV 93 02/25/2023     02/25/2023    MCH 30 9 02/25/2023    MCHC 33 4 02/25/2023    RDW 12 9 02/25/2023    MPV 10 2 02/25/2023    NRBC 0 02/25/2023     CMP:   Lab Results   Component Value Date    SODIUM 136 02/25/2023     02/25/2023    CO2 25 02/25/2023    BUN 15 02/25/2023    CREATININE 0 57 (L) 02/25/2023    CALCIUM 8 7 02/25/2023    AST 14 02/25/2023    ALT 11 02/25/2023    ALKPHOS 69 02/25/2023    EGFR 95 02/25/2023

## 2023-02-25 NOTE — ASSESSMENT & PLAN NOTE
• S/p mechanical fall on to outstretched left hand  • Left hand dominant   • Ortho to take to OR 2/24 at 0730   • Pain control with oxy, tylenol, dilaudid, and robaxin   • NV distally on admission - continue NV checks q4h overnight   • Patient had surgery done 2/24/2023 with open reduction and internal fixation  • PT/OT pending

## 2023-02-25 NOTE — ASSESSMENT & PLAN NOTE
• S/p mechanical fall on to outstretched left hand  • Left hand dominant   • Ortho to take to OR 2/24 at 0730   • Pain control with oxy, tylenol, dilaudid, and robaxin   • NV distally on admission - continue NV checks q4h overnight   • Patient had surgery done 2/24/2023 with open reduction and internal fixation  • PT/OT commence outpatient physiotherapy for the patient  • Per orthopedics, patient stable for discharge to follow-up with Dr Cameron Whitfield as outpatient  • No weightbearing on the left upper extremity

## 2023-02-27 ENCOUNTER — TELEPHONE (OUTPATIENT)
Dept: OBGYN CLINIC | Facility: HOSPITAL | Age: 68
End: 2023-02-27

## 2023-02-27 NOTE — TELEPHONE ENCOUNTER
Hello,  Please advise if the following patient can be forced onto the schedule:    Patient: Hector Ram    : 1955    MRN: [id number]    Call back #:[phone]    Insurance: [employer]    Reason for appointment: Patient had surgery on  by arslan and needs to schedule first post op appointment  Jama Ortiz does not have schedule when the post op should been seen by  Please advise     Requested doctor/location: Jama Ortiz       Thank you      Sent email to leardership and sme

## 2023-02-28 ENCOUNTER — TELEPHONE (OUTPATIENT)
Dept: OBGYN CLINIC | Facility: HOSPITAL | Age: 68
End: 2023-02-28

## 2023-02-28 NOTE — TELEPHONE ENCOUNTER
Called pt in order to schedule 1st PO appt  Lmom asking her to please cb in order to schedule w/ Moine Dunbar PA-C for 3/6 in the afternoon (2:30, 3pm, or 4pm spot)

## 2023-03-06 ENCOUNTER — HOSPITAL ENCOUNTER (OUTPATIENT)
Dept: RADIOLOGY | Facility: HOSPITAL | Age: 68
Discharge: HOME | End: 2023-03-06

## 2023-03-06 ENCOUNTER — OFFICE VISIT (OUTPATIENT)
Dept: OBGYN CLINIC | Facility: HOSPITAL | Age: 68
End: 2023-03-06

## 2023-03-06 VITALS
DIASTOLIC BLOOD PRESSURE: 84 MMHG | WEIGHT: 190.04 LBS | SYSTOLIC BLOOD PRESSURE: 144 MMHG | HEART RATE: 79 BPM | BODY MASS INDEX: 29.83 KG/M2 | HEIGHT: 67 IN

## 2023-03-06 DIAGNOSIS — S52.502B FRACTURE OF DISTAL RADIUS AND ULNA, LEFT, OPEN TYPE I OR II, INITIAL ENCOUNTER: Primary | ICD-10-CM

## 2023-03-06 DIAGNOSIS — S52.602B FRACTURE OF DISTAL RADIUS AND ULNA, LEFT, OPEN TYPE I OR II, INITIAL ENCOUNTER: ICD-10-CM

## 2023-03-06 DIAGNOSIS — S52.502B FRACTURE OF DISTAL RADIUS AND ULNA, LEFT, OPEN TYPE I OR II, INITIAL ENCOUNTER: ICD-10-CM

## 2023-03-06 DIAGNOSIS — S52.602B FRACTURE OF DISTAL RADIUS AND ULNA, LEFT, OPEN TYPE I OR II, INITIAL ENCOUNTER: Primary | ICD-10-CM

## 2023-03-06 NOTE — PROGRESS NOTES
Assessment:   Diagnosis ICD-10-CM Associated Orders   1  Fracture of distal radius and ulna, left, open type I or II, initial encounter  S52 502B XR wrist 3+ vw left    S52 602B Cock Up Wrist Splint     DXA bone density spine hip and pelvis          Plan:  · A discussion was had with the patient that her imaging looks very good at today's visit  · Most of her sutures were removed and Steri-Strips were applied  The patient may shower at this time and pat the wounds dry when she is done  The Steri-Strips will fall off on their own in the shower in 7-10 days  She should avoid soaking her wounds in a bath or pool  The remaining stitches at the distal aspect of her volar incision site will be removed next week  · A cock up wrist splint was given to the patient at today's visit  We discussed that she should wear it at almost all times of the day and night except to work on range of motion  She should remove the brace at least 3 times a day to work on wrist flexion, extension, supination, and pronation  She may also remove the brace if she is just relaxing at home and there is no danger that she may fall  · She should not attempt to lift anything besides small items of food to her mouth at this time  She should focus only on movement at this time  · We discussed that her swelling is normal at this point in her recovery  It may persist for 6-12 months after surgery  We discussed rest, ice, compression with an ace bandage, and elevation with the fingers pointed toward the ceiling may all be helpful for swelling control  · We discussed that adding an NSAID such as ibuprofen for her pain regimen may be helpful for her  She should take ibuprofen and Tylenol consistently to control her baseline pain levels, and then save the oxycodone for severe pain  · Her nerve pain is not necessarily unusual at this point and may be due to wrist pain and swelling    We will monitor the nerve pain and order an EMG in the future if her symptoms persist   · A DXA scan was ordered for the patient to check for osteoporosis  To do next visit:  Follow-up in 1 week for suture removal   No X-rays will be needed at this visit  The above stated was discussed in layman's terms and the patient expressed understanding  All questions were answered to the patient's satisfaction  Subjective:   Steven Grant is a 75 y o  female who presents to the office today for a first post-op appointment from her ORIF of the left distal radius on 2/24/23  Today the patient reports wrist and hand pain and swelling  She admits to some intermittent numbness and tingling over her long and ring fingers  She has been taking Tylenol intermittently for mild to moderate pain and saving the oxycodone for severe pain only  She tries to avoid taking the oxycodone as much as possible  Review of systems negative unless otherwise specified in HPI    Past Medical History:   Diagnosis Date   • Anxiety    • Morales esophagus    • [facility] ) 09/24/2020    right mucinous Ca   • Colon polyp    • Gastroesophageal reflux disease without esophagitis 5/14/2019   • GERD (gastroesophageal reflux disease)    • History of gallstones    • Hypertension    • Personal history of colonic polyps 5/14/2019   • Primary biliary cirrhosis ([facility] ) 5/14/2019   • Seasonal allergies        Past Surgical History:   Procedure Laterality Date   • APPENDECTOMY     • BREAST BIOPSY Right 09/24/2020    stereo-  mucinous ca   • BREAST BIOPSY Left 10/06/2020    us bx- neg   • BREAST LUMPECTOMY Right 12/22/2020    Procedure: BREAST NEEDLE LOCALIZED LUMPECTOMY (NEEDLE LOC AT 1230); Surgeon: Nikhil Khan MD;  [facility];  Service: Surgical Oncology   • COLONOSCOPY  06/24/2020     2 adenomas    Five year recall advised   • INTRAOPERATIVE RADIATION THERAPY (IORT) Right 12/22/2020    Procedure: BREAST INTRAOPERATIVE RADIATION THERAPY (IORT) BY DR Jacek Hodges;  Surgeon: Nikhil Khan MD;  [facility];  Service: Surgical Oncology   • LYMPH NODE BIOPSY Right 12/22/2020    Procedure: SENTINEL LYMPH NODE BIOPSY; LYMPHATIC MAPPING WITH BLUE DYE AND RADIOACTIVE DYE (INJECT AT 1400 BY DR KNOX IN THE OR); Surgeon: Nikhil Khan MD;  [facility];  Service: Surgical Oncology   • MAMMO NEEDLE LOCALIZATION RIGHT (ALL INC) Right 12/22/2020   • MAMMO STEREOTACTIC BREAST BIOPSY RIGHT (ALL INC) Right 09/24/2020   • ORIF WRIST FRACTURE Left 2/24/2023    Procedure: OPEN REDUCTION W/ INTERNAL FIXATION (ORIF) RADIUS / Lisa Leopard (WRIST), irrigation;  Surgeon: Ashish Snyder MD;  [facility];  Service: Orthopedics   • TUBAL LIGATION     • UPPER GASTROINTESTINAL ENDOSCOPY  06/24/2019    irregular z-line  biopsies negative for Morales's       Family History   Problem Relation Age of Onset   • No Known Problems Mother    • Lung cancer Father 48   • No Known Problems Sister    • No Known Problems Sister    • No Known Problems Brother    • No Known Problems Maternal Grandmother    • No Known Problems Maternal Grandfather    • No Known Problems Paternal Grandmother    • No Known Problems Paternal Grandfather    • No Known Problems Maternal Aunt    • No Known Problems Paternal Aunt    • No Known Problems Paternal Aunt    • No Known Problems Daughter    • Colon cancer Neg Hx    • Colon polyps Neg Hx        Social History     Occupational History   • Not on file   Tobacco Use   • Smoking status: Never   • Smokeless tobacco: Never   Vaping Use   • Vaping Use: Never used   Substance and Sexual Activity   • Alcohol use:  Yes   • Drug use: Never   • Sexual activity: Not on file         Current Outpatient Medications:   •  albuterol (PROVENTIL HFA,VENTOLIN HFA) 90 mcg/act inhaler, Inhale 1 puff every 4 (four) hours as needed for wheezing, Disp: , Rfl:   •  anastrozole (ARIMIDEX) 1 mg tablet, Take 1 tablet (1 mg total) by mouth daily, Disp: 90 tablet, Rfl: 3  •  Azelastine HCl 137 MCG/SPRAY SOLN, into each nostril daily as needed , Disp: , Rfl:   •  Calcium Carbonate-Vitamin D (Calcium 500 + D) 500-125 MG-UNIT TABS, Take by mouth daily, Disp: , Rfl:   •  CRANBERRY EXTRACT PO, Take by mouth, Disp: , Rfl:   •  loratadine-pseudoephedrine (CLARITIN-D 24-HOUR)  mg per 24 hr tablet, Take 1 tablet by mouth as needed for allergies, Disp: , Rfl:   •  Multiple Vitamins-Minerals (MULTIVITAMIN WOMEN 50+ PO), Take by mouth daily , Disp: , Rfl:   •  oxyCODONE (Roxicodone) 5 immediate release tablet, Take 1 tablet (5 mg total) by mouth every 6 (six) hours as needed for severe pain for up to 10 days Max Daily Amount: 20 mg, Disp: 25 tablet, Rfl: 0  •  pantoprazole (PROTONIX) 40 mg tablet, Take 1 tablet (40 mg total) by mouth 2 (two) times a day, Disp: 180 tablet, Rfl: 12  •  sertraline (ZOLOFT) 100 mg tablet, Take 100 mg by mouth daily, Disp: , Rfl:   •  Turmeric Curcumin 500 MG CAPS, Take by mouth daily, Disp: , Rfl:   •  ursodiol (ACTIGALL) 500 MG tablet, Take 2 tablets (1,000 mg total) by mouth 2 (two) times a day, Disp: 360 tablet, Rfl: 1  •  zolpidem (AMBIEN) 10 mg tablet, Take 10 mg by mouth daily at bedtime, Disp: , Rfl:     Allergies   Allergen Reactions   • Aspirin GI Intolerance            Vitals:    03/06/23 1544   BP: 144/84   Pulse: 79       Objective:    General:  Patient is WDWN, alert and oriented, appears stated age, and is in no acute distress  Musculoskeletal:    Left Wrist:    Inspection:  Incisions are well-approximated and well-healing without erythema or purulent material indicative of infection  There is some ecchymosis surrounding the proximal portion of the incision  A few of the stitches at the distal aspect of the wound have fresh blood surrounding them possibly indicating ongoing healing in the area  Range of Motion:  The patient is very limited in wrist movement at this time secondary to wrist and hand pain and swelling    She has trouble making a full composite fist due to swelling  Palpation:  She is tender with palpation of the wrist     Sensation:  SILT over the fingers  Other:  Fingers WWP  Diagnostics, reviewed and taken today if performed as documented: The attending physician has personally reviewed the pertinent films in PACS and interpretation is as follows:  Left Wrist X-rays:  The patient's fractures are held in stable alignment  There is no evidence of hardware loosening or failure  Procedures, if performed today:    None performed      Portions of the record may have been created with voice recognition software  Occasional wrong word or "sound a like" substitutions may have occurred due to the inherent limitations of voice recognition software  Read the chart carefully and recognize, using context, where substitutions have occurred

## 2023-03-13 ENCOUNTER — EVALUATION (OUTPATIENT)
Dept: OCCUPATIONAL THERAPY | Facility: CLINIC | Age: 68
End: 2023-03-13

## 2023-03-13 DIAGNOSIS — S62.102B OPEN FRACTURE OF LEFT WRIST, INITIAL ENCOUNTER: Primary | ICD-10-CM

## 2023-03-13 DIAGNOSIS — Z98.890 S/P ORIF (OPEN REDUCTION INTERNAL FIXATION) FRACTURE: ICD-10-CM

## 2023-03-13 DIAGNOSIS — Z87.81 S/P ORIF (OPEN REDUCTION INTERNAL FIXATION) FRACTURE: ICD-10-CM

## 2023-03-13 NOTE — PROGRESS NOTES
OT Evaluation     Today's date: 3/13/2023  Patient name: Rock Velasco  : 1955  MRN: [id number]  Referring provider: Rik Llamas*  Dx:   Encounter Diagnosis     ICD-10-CM    1  Open fracture of left wrist, initial encounter  S62 102B Ambulatory referral to Physical Therapy     OT Plan of Care Cert/Re-cert      2  S/P ORIF (open reduction internal fixation) fracture  Z98 890 Ambulatory referral to Physical Therapy    Z87 81 OT Plan of Care Cert/Re-cert          Start Time: 1310  Stop Time: 1400  Total time in clinic (min): 50 minutes    Assessment  Assessment details: Patient presents today on 3/13/23 for initial evaluation and treatment following a distal radius/ulna fracture  Patient has gross edema on the volar and dorsal side of the wrist  Swelling just taken distal to radial styloid L/R 20 5cm/17cm  Patient has limited AROM of wrist and digits  Patient has discomfort when moving wrist and making fist  Patient is able to oppose to 5th digit but cannot touch distal palmar crease  Due to precautions, patient was only assessed for strength on R side  Patient has increased pain and decreased AROM of wrist which is limiting her function in ADLs/IADLs such as lifting, jars, bottles, self-care, meal prep, and caregiver  Patient would benefit from skilled occupational therapy  Impairments: abnormal or restricted ROM, impaired physical strength, lacks appropriate home exercise program and pain with function  Functional limitations: lifting, jars, bottles, self-care, meal prep, role as mother, caregiverUnderstanding of Dx/Px/POC: good   Prognosis: good    Goals  ST  Compliant with HEP by 1 week  2  Decrease pain by 2 with AROM of L hand by 2 weeks or re-evaluation  LT  Able to make composite fist with 0 pain in L hand by 4 weeks  2  Increase AROM of L wrist to [facility] by 4 weeks  3  Decrease pain with function by 5 in L hand by 4 weeks  4   Increase FOTO score to projected outcome or greater by discharge    Plan  Patient would benefit from: OT eval and skilled occupational therapy  Planned modality interventions: thermotherapy: hydrocollator packs and cryotherapy  Planned therapy interventions: manual therapy, joint mobilization, activity modification, massage, therapeutic exercise, therapeutic activities, graded activity, graded exercise, graded motor and home exercise program  Frequency: 2x week  Duration in weeks: 4  Plan of Care beginning date: 3/13/2023  Treatment plan discussed with: patient        Subjective Evaluation    History of Present Illness  Date of onset: 2023  Date of surgery: 2023  Mechanism of injury: surgery  Mechanism of injury: Patient 75 y o female, was found to have a fracture of the distal radius and ulna on the left  She underwent open reduction and internal fixation by orthopedic surgery  Patient's pain is well controlled  Patient is currently clinically stable  Plan is to discharge to follow-up with orthopedic surgery as outpatient  She was evaluated by physical therapy who recommended outpatient physical therapy for the patient            Not a recurrent problem   Quality of life: good    Pain  Current pain ratin  At worst pain ratin  Quality: discomfort, dull ache and radiating  Relieving factors: medications and ice    Social Support  Steps to enter house: yes  Stairs in house: yes   Lives in: multiple-level home  Lives with: spouse and young children    Employment status: not working  Hand dominance: left    Patient Goals  Patient goal: decrease pain to help support ADLs/IADLs such as self-care, role as mother, meal prep        Objective     Tenderness     Additional Tenderness Details  Gross edema volar and dorsal side of L wrist    Active Range of Motion     Left Elbow   Forearm supination: 42 degrees WFL and with pain  Forearm pronation: WFL and with pain    Left Wrist   Wrist flexion: 10 degrees with pain  Wrist extension: 15 degrees with pain  Radial deviation: 10 degrees   Ulnar deviation: 30 degrees     Left Thumb   Flexion     MP: 50 degrees    DIP: 40 degrees  Extension     CMC: 0 degrees    MP: 0 degrees    DIP: 0 degrees  Palmar Abduction     CMC: 45 degrees  Radial abduction    CMC: 45 degrees    Left Digits    Flexion   Index     MCP: 60    PIP: 90    DIP: 40  Middle     MCP: 60    PIP: 90    DIP: 40  Ring     MCP: 60    PIP: 90    DIP: 40  Little     MCP: 50    PIP: 90    DIP: 50    Strength/Myotome Testing     Right Wrist/Hand      (2nd hand position)     Trial 1: 20    Thumb Strength   Key/Lateral Pinch     Trial 1: 12    Additional Strength Details  WFL    Swelling     Left Wrist/Hand   Wrist circumference (cm): 20 5cm  Additional Swelling Details  Able to touch 5th digit but cannot slide down to distal palmar crease    Right Wrist/Hand   Wrist circumference (cm): 17cm      General Comments:      Shoulder Comments   WFL             Precautions: surgery 2/23/23 AROM only      Manuals 3/13            Retrograde mass 4m            STM wrist 4m            Intrinsic stretch 4m            Scar management             Neuro Re-Ed                                                                                                        Ther Ex                                                                                                                     Ther Activity                                       Gait Training                                       Modalities              10m

## 2023-03-14 ENCOUNTER — OFFICE VISIT (OUTPATIENT)
Dept: OBGYN CLINIC | Facility: HOSPITAL | Age: 68
End: 2023-03-14

## 2023-03-14 VITALS
HEIGHT: 67 IN | SYSTOLIC BLOOD PRESSURE: 109 MMHG | BODY MASS INDEX: 29.83 KG/M2 | HEART RATE: 78 BPM | DIASTOLIC BLOOD PRESSURE: 72 MMHG | WEIGHT: 190.04 LBS

## 2023-03-14 DIAGNOSIS — S52.602B FRACTURE OF DISTAL RADIUS AND ULNA, LEFT, OPEN TYPE I OR II, INITIAL ENCOUNTER: Primary | ICD-10-CM

## 2023-03-14 DIAGNOSIS — Z87.81 S/P ORIF (OPEN REDUCTION INTERNAL FIXATION) FRACTURE: ICD-10-CM

## 2023-03-14 DIAGNOSIS — Z98.890 S/P ORIF (OPEN REDUCTION INTERNAL FIXATION) FRACTURE: ICD-10-CM

## 2023-03-14 DIAGNOSIS — S52.502B FRACTURE OF DISTAL RADIUS AND ULNA, LEFT, OPEN TYPE I OR II, INITIAL ENCOUNTER: Primary | ICD-10-CM

## 2023-03-14 RX ORDER — OXYCODONE HYDROCHLORIDE 5 MG/1
5 TABLET ORAL EVERY 6 HOURS PRN
Qty: 25 TABLET | Refills: 0 | Status: SHIPPED | OUTPATIENT
Start: 2023-03-14 | End: 2023-03-24

## 2023-03-14 NOTE — PROGRESS NOTES
Assessment:   Diagnosis ICD-10-CM Associated Orders   1  Fracture of distal radius and ulna, left, open type I or II, initial encounter  S52 502B oxyCODONE (Roxicodone) 5 immediate release tablet    S52 602B       2  S/P ORIF (open reduction internal fixation) fracture  Z98 890 oxyCODONE (Roxicodone) 5 immediate release tablet    Z87 81           Plan:  · The patient's wounds look good at this time  Her remaining sutures were removed at today's visit and Steri-Strips were applied  She may continue to shower but should not soak her wounds until they are completely healed  · She should work hard on wrist flexion, extension, supination, and pronation  We discussed using a hammer for wrist mobility  She should continue with PT     · An oxycodone refill was prescribed for the patient to help relieve severe pain  · We discussed using her wrist brace most times of the day and night except when at home relaxing and when doing her exercises  To do next visit:  Follow-up in 4 weeks for further imaging and to assess post-op pain and function  The above stated was discussed in layman's terms and the patient expressed understanding  All questions were answered to the patient's satisfaction  Scribe Attestation    I,:  Felicia Terry PA-C am acting as a scribe while in the presence of the attending physician :       I,:  Charo Mitchell MD personally performed the services described in this documentation    as scribed in my presence :           Subjective:   Will Reno is a 75 y o  female who presents to the office today approximately 2 5 weeks out from her ORIF of the left distal radius on 2/24/23  She started physical therapy yesterday  She is having more severe pain in her wrist now that she has been exercising the wrist and hand more  She would like a refill of her oxycodone          Review of systems negative unless otherwise specified in HPI    Past Medical History:   Diagnosis Date   • Anxiety • Morales esophagus    • [facility]2020    right mucinous Ca   • Colon polyp    • Gastroesophageal reflux disease without esophagitis 5/14/2019   • GERD (gastroesophageal reflux disease)    • History of gallstones    • Hypertension    • Personal history of colonic polyps 5/14/2019   • [facility] 5/14/2019   • Seasonal allergies        Past Surgical History:   Procedure Laterality Date   • APPENDECTOMY     • BREAST BIOPSY Right 09/24/2020    stereo-  mucinous ca   • BREAST BIOPSY Left 10/06/2020    us bx- neg   • BREAST LUMPECTOMY Right 12/22/2020    Procedure: BREAST NEEDLE LOCALIZED LUMPECTOMY (NEEDLE LOC AT 1230); Surgeon: Ela Bender MD;  [facility];  Service: Surgical Oncology   • COLONOSCOPY  06/24/2020     2 adenomas  Five year recall advised   • INTRAOPERATIVE RADIATION THERAPY (IORT) Right 12/22/2020    Procedure: BREAST INTRAOPERATIVE RADIATION THERAPY (IORT) BY DR Jessika Ulloa;  Surgeon: Ela Bender MD;  [facility];  Service: Surgical Oncology   • LYMPH NODE BIOPSY Right 12/22/2020    Procedure: SENTINEL LYMPH NODE BIOPSY; LYMPHATIC MAPPING WITH BLUE DYE AND RADIOACTIVE DYE (INJECT AT 1400 BY DR KNOX IN THE OR); Surgeon: Ela Bender MD;  [facility];  Service: Surgical Oncology   • MAMMO NEEDLE LOCALIZATION RIGHT (ALL INC) Right 12/22/2020   • MAMMO STEREOTACTIC BREAST BIOPSY RIGHT (ALL INC) Right 09/24/2020   • ORIF WRIST FRACTURE Left 2/24/2023    Procedure: OPEN REDUCTION W/ INTERNAL FIXATION (ORIF) RADIUS / Royanne Bruce (WRIST), irrigation;  Surgeon: Ridge Banda MD;  Location: [facility];  Service: Orthopedics   • TUBAL LIGATION     • UPPER GASTROINTESTINAL ENDOSCOPY  06/24/2019    irregular z-line    biopsies negative for Morales's       Family History   Problem Relation Age of Onset   • No Known Problems Mother    • Lung cancer Father 48   • No Known Problems Sister    • No Known Problems Sister    • No Known Problems Brother    • No Known Problems Maternal Grandmother    • No Known Problems Maternal Grandfather    • No Known Problems Paternal Grandmother    • No Known Problems Paternal Grandfather    • No Known Problems Maternal Aunt    • No Known Problems Paternal Aunt    • No Known Problems Paternal Aunt    • No Known Problems Daughter    • Colon cancer Neg Hx    • Colon polyps Neg Hx        Social History     Occupational History   • Not on file   Tobacco Use   • Smoking status: Never   • Smokeless tobacco: Never   Vaping Use   • Vaping Use: Never used   Substance and Sexual Activity   • Alcohol use:  Yes   • Drug use: Never   • Sexual activity: Not on file         Current Outpatient Medications:   •  albuterol (PROVENTIL HFA,VENTOLIN HFA) 90 mcg/act inhaler, Inhale 1 puff every 4 (four) hours as needed for wheezing, Disp: , Rfl:   •  anastrozole (ARIMIDEX) 1 mg tablet, Take 1 tablet (1 mg total) by mouth daily, Disp: 90 tablet, Rfl: 3  •  Azelastine HCl 137 MCG/SPRAY SOLN, into each nostril daily as needed , Disp: , Rfl:   •  Calcium Carbonate-Vitamin D (Calcium 500 + D) 500-125 MG-UNIT TABS, Take by mouth daily, Disp: , Rfl:   •  CRANBERRY EXTRACT PO, Take by mouth, Disp: , Rfl:   •  loratadine-pseudoephedrine (CLARITIN-D 24-HOUR)  mg per 24 hr tablet, Take 1 tablet by mouth as needed for allergies, Disp: , Rfl:   •  Multiple Vitamins-Minerals (MULTIVITAMIN WOMEN 50+ PO), Take by mouth daily , Disp: , Rfl:   •  oxyCODONE (Roxicodone) 5 immediate release tablet, Take 1 tablet (5 mg total) by mouth every 6 (six) hours as needed for severe pain for up to 10 days Max Daily Amount: 20 mg, Disp: 25 tablet, Rfl: 0  •  pantoprazole (PROTONIX) 40 mg tablet, Take 1 tablet (40 mg total) by mouth 2 (two) times a day, Disp: 180 tablet, Rfl: 12  •  sertraline (ZOLOFT) 100 mg tablet, Take 100 mg by mouth daily, Disp: , Rfl:   •  Turmeric Curcumin 500 MG CAPS, Take by mouth daily, Disp: , Rfl:   •  ursodiol (ACTIGALL) 500 MG tablet, Take 2 tablets (1,000 mg total) by mouth 2 (two) times a day, Disp: 360 tablet, Rfl: 1  •  zolpidem (AMBIEN) 10 mg tablet, Take 10 mg by mouth daily at bedtime, Disp: , Rfl:     Allergies   Allergen Reactions   • Aspirin GI Intolerance            Vitals:    03/14/23 1023   BP: 109/72   Pulse: 78       Objective:    General:  Patient is WDWN, alert and oriented, appears stated age, and is in no acute distress  Musculoskeletal:    Left Wrist:    Inspection:  Incisions are well-approximated and well-healing without erythema or purulent material indicative of infection  Range of Motion:  The patient is limited at this time with wrist motion  She is able to move all fingers without difficulty  Palpation:  She is mildly tender over the wrist     Sensation:  SILT over the fingers  Other:  Fingers WWP  Diagnostics, reviewed and taken today if performed as documented:    None performed        Procedures, if performed today:    None performed      Portions of the record may have been created with voice recognition software  Occasional wrong word or "sound a like" substitutions may have occurred due to the inherent limitations of voice recognition software  Read the chart carefully and recognize, using context, where substitutions have occurred

## 2023-03-15 ENCOUNTER — OFFICE VISIT (OUTPATIENT)
Dept: OCCUPATIONAL THERAPY | Facility: CLINIC | Age: 68
End: 2023-03-15

## 2023-03-15 DIAGNOSIS — S62.102B OPEN FRACTURE OF LEFT WRIST, INITIAL ENCOUNTER: Primary | ICD-10-CM

## 2023-03-15 DIAGNOSIS — Z98.890 S/P ORIF (OPEN REDUCTION INTERNAL FIXATION) FRACTURE: ICD-10-CM

## 2023-03-15 DIAGNOSIS — Z87.81 S/P ORIF (OPEN REDUCTION INTERNAL FIXATION) FRACTURE: ICD-10-CM

## 2023-03-15 NOTE — PROGRESS NOTES
Daily Note     Today's date: 3/15/2023  Patient name: Pj Michael  : 1955  MRN: [id number]  Referring provider: Roly Cartagena*  Dx:   Encounter Diagnosis     ICD-10-CM    1  Open fracture of left wrist, initial encounter  S62 102B       2  S/P ORIF (open reduction internal fixation) fracture  Z98 890     Z87 81           Start Time: 1108  Stop Time: 1150  Total time in clinic (min): 42 minutes    Subjective: Wrist is feeling better, the exercises make my wrist a little sore      Objective: See treatment diary below      Assessment: Tolerated treatment well  Patient has continued gross edema over dorsal side of hand and all aspect of the wrist  Patient has tenderness over incision sites and dorsal side of hand  Patient had moderate fatigue after treatment session performing AROM exercises  Patient would benefit from continued OT      Plan: Continue per plan of care         Precautions: surgery 23 AROM only      Manuals 3/13 3/15           Retrograde mass 4m 4m           STM wrist 4m 4m           Intrinsic stretch 4m 4m           Scar management                          HEP 3x10                                                                                          Ther Ex             TGE  20x           Wrist AROM  Cone  2x10           Hammer  Pro/sup  Dowel  2x10                                                                            Ther Activity             Pegs   40x           Peg place  40x                                                  Modalities             MH 10m 8m             5m

## 2023-03-20 ENCOUNTER — OFFICE VISIT (OUTPATIENT)
Dept: OCCUPATIONAL THERAPY | Facility: CLINIC | Age: 68
End: 2023-03-20

## 2023-03-20 DIAGNOSIS — Z98.890 S/P ORIF (OPEN REDUCTION INTERNAL FIXATION) FRACTURE: ICD-10-CM

## 2023-03-20 DIAGNOSIS — Z87.81 S/P ORIF (OPEN REDUCTION INTERNAL FIXATION) FRACTURE: ICD-10-CM

## 2023-03-20 DIAGNOSIS — S62.102B OPEN FRACTURE OF LEFT WRIST, INITIAL ENCOUNTER: Primary | ICD-10-CM

## 2023-03-20 NOTE — PROGRESS NOTES
Daily Note     Today's date: 3/20/2023  Patient name: Yarelis Velasquez  : 1955  MRN: [id number]  Referring provider: Angi Goldman*  Dx:   Encounter Diagnosis     ICD-10-CM    1  Open fracture of left wrist, initial encounter  S62 102B       2  S/P ORIF (open reduction internal fixation) fracture  Z98 890     Z87 81           Start Time: 1138  Stop Time: 1218  Total time in clinic (min): 40 minutes    Subjective: My wrist is getting more movement  Objective: See treatment diary below      Assessment: Tolerated treatment well  Patient had moderate fatigue after treatment session doing AROM activities  Patient had increased AROM based on previous treatment sessions  Patient would benefit from continued OT      Plan: Continue per plan of care         Precautions: surgery 23 AROM only      Manuals 3/13 3/15 3/20          Retrograde mass 4m 4m 4m          STM wrist 4m 4m 2m          Intrinsic stretch 4m 4m 4m          Scar management   2m                       HEP 3x10                                                                             Ther Ex             Blocking PIP/MCP   20x          TGE  20x 20x          Wrist AROM  Ext/flex/rd  Cone  2x10 pen  3x10          Hammer  Pro/sup  Dowel  2x10 Dowel  3x10          Wrist maze                                                                 Ther Activity             Pegs   40x 40x          Peg place  40x 40x                                                 Modalities             MH 10m 8m 8m            5m 5m

## 2023-03-22 ENCOUNTER — OFFICE VISIT (OUTPATIENT)
Dept: OCCUPATIONAL THERAPY | Facility: CLINIC | Age: 68
End: 2023-03-22

## 2023-03-22 DIAGNOSIS — Z87.81 S/P ORIF (OPEN REDUCTION INTERNAL FIXATION) FRACTURE: ICD-10-CM

## 2023-03-22 DIAGNOSIS — S62.102B OPEN FRACTURE OF LEFT WRIST, INITIAL ENCOUNTER: Primary | ICD-10-CM

## 2023-03-22 DIAGNOSIS — Z98.890 S/P ORIF (OPEN REDUCTION INTERNAL FIXATION) FRACTURE: ICD-10-CM

## 2023-03-22 NOTE — PROGRESS NOTES
Daily Note     Today's date: 3/22/2023  Patient name: Jeramie Catherine  : 1955  MRN: [id number]  Referring provider: Van Quinonez*  Dx:   Encounter Diagnosis     ICD-10-CM    1  Open fracture of left wrist, initial encounter  S62 102B       2  S/P ORIF (open reduction internal fixation) fracture  Z98 890     Z87 81           Start Time: 1133  Stop Time: 1213  Total time in clinic (min): 40 minutes    Subjective: I'm getting more movement out of my wrist      Objective: See treatment diary below      Assessment: Tolerated treatment well  Patient had moderate fatigue after treatment session doing AROM activities  Patient states she is doing more activities at home  Patient has increased AROM of wrist  Patient is able to make full composite fist and touch distal palmar crease  Patient would benefit from continued OT      Plan: Continue per plan of care         Precautions: surgery 23 AROM only      Manuals 3/13 3/15 3/20 3/22         Retrograde mass 4m 4m 4m 4m         STM wrist 4m 4m 2m 2m         Intrinsic stretch 4m 4m 4m 4m         Scar management   2m 2m                      HEP 3x10                                                                             Ther Ex             Blocking PIP/MCP   20x 20x         TGE  20x 20x 20x         Wrist AROM  Ext/flex/rd  Cone  2x10 pen  3x10 Pen  3x10         Hammer  Pro/sup  Dowel  2x10 Dowel  3x10 Dowel  3x10         Wrist maze                                                                 Ther Activity             Pegs   40x 40x 40x         Peg place  40x 40x 40x                                                Modalities             MH 10m 8m 8m 8m           5m 5m 5m

## 2023-03-27 ENCOUNTER — OFFICE VISIT (OUTPATIENT)
Dept: OCCUPATIONAL THERAPY | Facility: CLINIC | Age: 68
End: 2023-03-27

## 2023-03-27 DIAGNOSIS — Z87.81 S/P ORIF (OPEN REDUCTION INTERNAL FIXATION) FRACTURE: ICD-10-CM

## 2023-03-27 DIAGNOSIS — Z98.890 S/P ORIF (OPEN REDUCTION INTERNAL FIXATION) FRACTURE: ICD-10-CM

## 2023-03-27 DIAGNOSIS — S62.102B OPEN FRACTURE OF LEFT WRIST, INITIAL ENCOUNTER: Primary | ICD-10-CM

## 2023-03-27 NOTE — PROGRESS NOTES
Daily Note     Today's date: 3/27/2023  Patient name: Ruth Aldridge  : 1955  MRN: [id number]  Referring provider: Iggy Galarza*  Dx:   Encounter Diagnosis     ICD-10-CM    1  Open fracture of left wrist, initial encounter  S62 102B       2  S/P ORIF (open reduction internal fixation) fracture  Z98 890     Z87 81                      Subjective: I am doing ok       Objective: See treatment diary below      Assessment: Tolerated treatment well  Patient has improved ROM       Plan: Continue per plan of care        Precautions: surgery 23 AROM only      Manuals 3/13 3/15 3/20 3/22 3/27        Retrograde mass 4m 4m 4m 4m 4m        STM wrist 4m 4m 2m 2m 2m        Intrinsic stretch 4m 4m 4m 4m 4m        Scar management   2m 2m 2m                     HEP 3x10                                                                             Ther Ex             Blocking PIP/MCP   20x 20x 20x        TGE  20x 20x 20x 20x        Wrist AROM  Ext/flex/rd  Cone  2x10 pen  3x10 Pen  3x10 3x10        Hammer  Pro/sup  Dowel  2x10 Dowel  3x10 Dowel  3x10 Dowel  3x10        Wrist maze                                                                 Ther Activity             Pegs   40x 40x 40x 40x        Peg place  40x 40x 40x 40x                                               Modalities             MH 10m 8m 8m 8m 8m          5m 5m 5m 5m

## 2023-03-29 ENCOUNTER — OFFICE VISIT (OUTPATIENT)
Dept: OCCUPATIONAL THERAPY | Facility: CLINIC | Age: 68
End: 2023-03-29

## 2023-03-29 DIAGNOSIS — Z87.81 S/P ORIF (OPEN REDUCTION INTERNAL FIXATION) FRACTURE: ICD-10-CM

## 2023-03-29 DIAGNOSIS — S62.102B OPEN FRACTURE OF LEFT WRIST, INITIAL ENCOUNTER: Primary | ICD-10-CM

## 2023-03-29 DIAGNOSIS — Z98.890 S/P ORIF (OPEN REDUCTION INTERNAL FIXATION) FRACTURE: ICD-10-CM

## 2023-03-29 NOTE — PROGRESS NOTES
Daily Note     Today's date: 3/29/2023  Patient name: Socorro Steele  : 1955  MRN: [id number]  Referring provider: Jing Osorio*  Dx:   Encounter Diagnosis     ICD-10-CM    1  Open fracture of left wrist, initial encounter  S62 102B       2  S/P ORIF (open reduction internal fixation) fracture  Z98 890     Z87 81                      Subjective: I am doing ok       Objective: See treatment diary below      Assessment: Tolerated treatment well  Patient is making steady gains with ROM   Plan: Continue per plan of care        Precautions: surgery 23 AROM only      Manuals 3/13 3/15 3/20 3/22 3/27 3/29       Retrograde mass 4m 4m 4m 4m 4m 4m       STM wrist 4m 4m 2m 2m 2m 2m       Intrinsic stretch 4m 4m 4m 4m 4m 4m       Scar management   2m 2m 2m 2m                    HEP 3x10                                                                             Ther Ex             Blocking PIP/MCP   20x 20x 20x 20x       TGE  20x 20x 20x 20x 20x       Wrist AROM  Ext/flex/rd  Cone  2x10 pen  3x10 Pen  3x10 3x10 3x10       Hammer  Pro/sup  Dowel  2x10 Dowel  3x10 Dowel  3x10 Dowel  3x10 Dowel  3x10       Wrist maze                                                                 Ther Activity             Pegs   40x 40x 40x 40x 40x       Peg place  40x 40x 40x 40x 40x                                              Modalities             MH 10m 8m 8m 8m 8m 8m         5m 5m 5m 5m 5m

## 2023-03-30 DIAGNOSIS — S52.502B FRACTURE OF DISTAL RADIUS AND ULNA, LEFT, OPEN TYPE I OR II, INITIAL ENCOUNTER: Primary | ICD-10-CM

## 2023-03-30 DIAGNOSIS — S52.602B FRACTURE OF DISTAL RADIUS AND ULNA, LEFT, OPEN TYPE I OR II, INITIAL ENCOUNTER: Primary | ICD-10-CM

## 2023-04-03 ENCOUNTER — OFFICE VISIT (OUTPATIENT)
Dept: OCCUPATIONAL THERAPY | Facility: CLINIC | Age: 68
End: 2023-04-03

## 2023-04-03 DIAGNOSIS — S62.102B OPEN FRACTURE OF LEFT WRIST, INITIAL ENCOUNTER: Primary | ICD-10-CM

## 2023-04-03 DIAGNOSIS — Z98.890 S/P ORIF (OPEN REDUCTION INTERNAL FIXATION) FRACTURE: ICD-10-CM

## 2023-04-03 DIAGNOSIS — Z87.81 S/P ORIF (OPEN REDUCTION INTERNAL FIXATION) FRACTURE: ICD-10-CM

## 2023-04-03 NOTE — PROGRESS NOTES
"Daily Note     Today's date: 4/3/2023  Patient name: Harshad Torres  : 1955  MRN: [id number]  Referring provider: Heather Angeles*  Dx:   Encounter Diagnosis     ICD-10-CM    1  Open fracture of left wrist, initial encounter  S62 102B       2  S/P ORIF (open reduction internal fixation) fracture  Z98 890     Z87 81           Start Time:   Stop Time:   Total time in clinic (min): 38 minutes    Subjective: \"I think I overdid it  I am super sore today  \"      Objective: See treatment diary below      Assessment: Tolerated treatment well  Patient is sore today - reports she did a lot of things for the first time since surgery and is feeling it  Educated on gradually returning to normal activities and modifying based on her pain level in response  Plan: Continue per plan of care  Precautions: surgery 23 AROM only      Manuals 3/13 3/15 3/20 3/22 3/27 3/29 4/2      Retrograde mass 4m 4m 4m 4m 4m 4m 4m      STM wrist 4m 4m 2m 2m 2m 2m       Intrinsic stretch 4m 4m 4m 4m 4m 4m       Scar management   2m 2m 2m 2m 4m                   HEP 3x10                                                                             Ther Ex             Blocking PIP/MCP   20x 20x 20x 20x 20x hook fist with wedges for intrinsic stretch      TGE  20x 20x 20x 20x 20x 20x      Wrist AROM  Ext/flex/rd  Cone  2x10 pen  3x10 Pen  3x10 3x10 3x10 x10 each direction      Hammer  Pro/sup  Dowel  2x10 Dowel  3x10 Dowel  3x10 Dowel  3x10 Dowel  3x10 FA AROM      Wrist maze       5x                                                          Ther Activity             Pegs   40x 40x 40x 40x 40x -      Peg place  40x 40x 40x 40x 40x -                                             Modalities             MH 10m 8m 8m 8m 8m 8m 10m        5m 5m 5m 5m 5m 5m          Sensation and skin integrity assessed prior to, during, and following the application of moist heat pack   All assessments found sensation and integrity to " be intact  Pt instructed to inform clinician if use of the modality became uncomfortable and/or too intense to tolerate at any time

## 2023-04-05 ENCOUNTER — APPOINTMENT (OUTPATIENT)
Dept: OCCUPATIONAL THERAPY | Facility: CLINIC | Age: 68
End: 2023-04-05

## 2023-04-06 ENCOUNTER — VBI (OUTPATIENT)
Dept: ADMINISTRATIVE | Facility: OTHER | Age: 68
End: 2023-04-06

## 2023-04-06 NOTE — TELEPHONE ENCOUNTER
04/06/23 2:58 PM    The patient was called and a message was left with the return number for Central Scheduling [phone]  Thank you    Catherine Wells  [facility]

## 2023-04-10 ENCOUNTER — APPOINTMENT (OUTPATIENT)
Dept: OCCUPATIONAL THERAPY | Facility: CLINIC | Age: 68
End: 2023-04-10

## 2023-04-12 ENCOUNTER — APPOINTMENT (OUTPATIENT)
Dept: OCCUPATIONAL THERAPY | Facility: CLINIC | Age: 68
End: 2023-04-12

## 2023-04-27 ENCOUNTER — CONSULT (OUTPATIENT)
Dept: ENDOCRINOLOGY | Facility: HOSPITAL | Age: 68
End: 2023-04-27

## 2023-04-27 VITALS
BODY MASS INDEX: 30.39 KG/M2 | SYSTOLIC BLOOD PRESSURE: 120 MMHG | DIASTOLIC BLOOD PRESSURE: 72 MMHG | WEIGHT: 193.6 LBS | HEART RATE: 100 BPM | HEIGHT: 67 IN

## 2023-04-27 DIAGNOSIS — M81.0 OSTEOPOROSIS, UNSPECIFIED OSTEOPOROSIS TYPE, UNSPECIFIED PATHOLOGICAL FRACTURE PRESENCE: ICD-10-CM

## 2023-04-27 RX ORDER — ALENDRONATE SODIUM 70 MG/1
70 TABLET ORAL
Qty: 52 TABLET | Refills: 1 | Status: SHIPPED | OUTPATIENT
Start: 2023-04-27

## 2023-04-27 NOTE — PROGRESS NOTES
Chief Complaint   Patient presents with   • Osteoporosis      Referring Provider  [facility], [address]  [facility],  [address] [place] [address]     History of Present Illness:   Gil Yee is a 75 y o  female with osteoporosis seen in consultation at the request of [facility] after a recent left radius/Ulnar fracture after a mechanical fall from standing height and using hand for support  DXA scan after surgery showed T score of -2 7 at the forearm w -2 2 at AP spine, -1 6 at left hip and -1 7 at total hip  No prior DXA scan    HPI:   Onset:-   History of fragility fractures- Yes left radius/ulnar fracture- fell from standing height and used arm as support-   Chased her puppy with stick, fell down while chasing dog  Other fractures- 35yr ago broke right arm- whacked arm on molding of doorway  Conservative managed  Left wrist- fell on ice 13 years ago   Right wrist- tripped on coffee table- 10 years ago  Severe DJD- Some DDD in spine L4  Early menopause:- Age 54,   Family history of osteoporosis- None, parents  at young age  Rheumatoid arthritis- No   Glucocorticoid use- No  Smoking history- No  Alcohol use- Drinks 2-3 bottles of wine a week  PPI use- Pantoprozole 40mg BID- been on this for 10 years  Estrogen blocker- Does take Anastrozole for Breast cancer- 2 more years left  Antiseizure medication- None   Eating disorder- None  Dietary Calcium intake- Doesn't drink much milk, eats cheese, ice cream and leaf greens- daily  Calcium/Vitamin D supplementation:- takes MV- 200mg and calcium supplement 600mg 1 tablet each, 1000IU VitD through MV  Weight bearing exercises History of HRT use- None    Medications used in past:   Never been on any therapy  Last DXA Scan:-        Patient Active Problem List   Diagnosis   • Personal history of colonic polyps   • Gastroesophageal reflux disease without esophagitis   • Primary biliary cirrhosis ([facility] [address] )   • Morales's esophagus without dysplasia   • Malignant neoplasm of upper-outer quadrant of right breast in female, estrogen receptor positive (HCC)   • Use of anastrozole (Arimidex)   • COVID-19   • Anxiety   • Depression   • Fracture of distal radius and ulna, left, open type I or II, initial encounter   • Hypokalemia      Past Medical History:   Diagnosis Date   • Anxiety    • Morales esophagus    • [facility] ) 09/24/2020    right mucinous Ca   • Colon polyp    • Gastroesophageal reflux disease without esophagitis 5/14/2019   • GERD (gastroesophageal reflux disease)    • History of gallstones    • Hypertension    • Personal history of colonic polyps 5/14/2019   • Primary biliary cirrhosis ([facility] ) 5/14/2019   • Seasonal allergies       Past Surgical History:   Procedure Laterality Date   • APPENDECTOMY     • BREAST BIOPSY Right 09/24/2020    stereo-  mucinous ca   • BREAST BIOPSY Left 10/06/2020    us bx- neg   • BREAST LUMPECTOMY Right 12/22/2020    Procedure: BREAST NEEDLE LOCALIZED LUMPECTOMY (NEEDLE LOC AT 1230); Surgeon: Margaux Pedroza MD;  [facility];  Service: Surgical Oncology   • COLONOSCOPY  06/24/2020     2 adenomas  Five year recall advised   • INTRAOPERATIVE RADIATION THERAPY (IORT) Right 12/22/2020    Procedure: BREAST INTRAOPERATIVE RADIATION THERAPY (IORT) BY DR Tip Díaz;  Surgeon: Margaux Pedroza MD;  [facility];  Service: Surgical Oncology   • LYMPH NODE BIOPSY Right 12/22/2020    Procedure: SENTINEL LYMPH NODE BIOPSY; LYMPHATIC MAPPING WITH BLUE DYE AND RADIOACTIVE DYE (INJECT AT 1400 BY DR KNOX IN THE OR);   Surgeon: Margaux Pedroza MD;  [facility];  Service: Surgical Oncology   • MAMMO NEEDLE LOCALIZATION RIGHT (ALL INC) Right 12/22/2020   • MAMMO STEREOTACTIC BREAST BIOPSY RIGHT (ALL INC) Right 09/24/2020   • ORIF WRIST FRACTURE Left 2/24/2023    Procedure: OPEN REDUCTION W/ INTERNAL FIXATION (ORIF) RADIUS / Sissy Lois (WRIST), irrigation;  Surgeon: Skyler Pena MD;  Location:  MAIN OR;  Service: Orthopedics   • TUBAL LIGATION     • UPPER GASTROINTESTINAL ENDOSCOPY  06/24/2019    irregular z-line  biopsies negative for Morales's      Family History   Problem Relation Age of Onset   • No Known Problems Mother    • Lung cancer Father 48   • No Known Problems Sister    • No Known Problems Sister    • No Known Problems Brother    • No Known Problems Maternal Grandmother    • No Known Problems Maternal Grandfather    • No Known Problems Paternal Grandmother    • No Known Problems Paternal Grandfather    • No Known Problems Maternal Aunt    • No Known Problems Paternal Aunt    • No Known Problems Paternal Aunt    • No Known Problems Daughter    • Colon cancer Neg Hx    • Colon polyps Neg Hx      Social History     Tobacco Use   • Smoking status: Never   • Smokeless tobacco: Never   Substance Use Topics   • Alcohol use:  Yes     Alcohol/week: 8 0 standard drinks     Types: 8 Glasses of wine per week     Allergies   Allergen Reactions   • Aspirin GI Intolerance         Current Outpatient Medications:   •  alendronate (FOSAMAX) 70 mg tablet, Take 1 tablet (70 mg total) by mouth every 7 days, Disp: 52 tablet, Rfl: 1  •  anastrozole (ARIMIDEX) 1 mg tablet, Take 1 tablet (1 mg total) by mouth daily, Disp: 90 tablet, Rfl: 3  •  Calcium Carbonate-Vitamin D (Calcium 500 + D) 500-125 MG-UNIT TABS, Take by mouth daily, Disp: , Rfl:   •  CRANBERRY EXTRACT PO, Take by mouth, Disp: , Rfl:   •  loratadine-pseudoephedrine (CLARITIN-D 24-HOUR)  mg per 24 hr tablet, Take 1 tablet by mouth as needed for allergies, Disp: , Rfl:   •  Multiple Vitamins-Minerals (MULTIVITAMIN WOMEN 50+ PO), Take by mouth daily , Disp: , Rfl:   •  pantoprazole (PROTONIX) 40 mg tablet, Take 1 tablet (40 mg total) by mouth 2 (two) times a day, Disp: 180 tablet, Rfl: 12  •  sertraline (ZOLOFT) 100 mg tablet, Take 100 mg by mouth daily, Disp: , Rfl:   •  Turmeric Curcumin 500 MG CAPS, Take by mouth daily, Disp: , Rfl:   •  ursodiol "(ACTIGALL) 500 MG tablet, Take 2 tablets (1,000 mg total) by mouth 2 (two) times a day, Disp: 360 tablet, Rfl: 1  •  zolpidem (AMBIEN) 10 mg tablet, Take 10 mg by mouth daily at bedtime, Disp: , Rfl:   •  albuterol (PROVENTIL HFA,VENTOLIN HFA) 90 mcg/act inhaler, Inhale 1 puff every 4 (four) hours as needed for wheezing (Patient not taking: Reported on 4/27/2023), Disp: , Rfl:   •  Azelastine HCl 137 MCG/SPRAY SOLN, into each nostril daily as needed  (Patient not taking: Reported on 4/27/2023), Disp: , Rfl:   Review of Systems   Constitutional: Negative for chills and fever  HENT: Negative for ear pain and sore throat  Eyes: Negative for pain and visual disturbance  Respiratory: Negative for cough and shortness of breath  Cardiovascular: Negative for chest pain and palpitations  Gastrointestinal: Negative for abdominal pain and vomiting  Genitourinary: Negative for dysuria and hematuria  Musculoskeletal: Negative for arthralgias and back pain  Skin: Negative for color change and rash  Neurological: Negative for seizures and syncope  All other systems reviewed and are negative  Physical Exam:  Body mass index is 30 23 kg/m²    /72   Pulse 100   Ht 5' 7 1\" (1 704 m)   Wt 87 8 kg (193 lb 9 6 oz)   BMI 30 23 kg/m²    Wt Readings from Last 3 Encounters:   04/27/23 87 8 kg (193 lb 9 6 oz)   04/12/23 90 4 kg (199 lb 3 2 oz)   04/11/23 86 2 kg (190 lb 0 6 oz)       GEN: NAD  Eyes: no stare or proptosis, nl lids and conjunctiva, EOMI  Neck: trachea midline, thyroid NT to palpation, nl in size, no nodules or neck masses noted, no cervical LAD  CV; heart reg rate s1s2 nl, no m/r/g appreciated, no RUBINA  Resp: CTAB, good effort  Ab+BS  Neuro: no tremor, 2+ DTRs in BUE  MS: no c/c in digits, moves all 4 ext, nl muscle bulk, gait nl  Skin: warm and dry, no palmar erythema  Has left wrist splint  Psych: nl mood and affect, no gross lapses in memory    DATA:  Labs:     Lab Results   Component Value " Date    SODIUM 136 02/25/2023    K 4 3 02/25/2023     02/25/2023    CO2 25 02/25/2023    BUN 15 02/25/2023    CREATININE 0 57 (L) 02/25/2023    GLUC 113 02/25/2023    CALCIUM 8 7 02/25/2023      Radiology    04/12/2023   DXA SCAN     CLINICAL HISTORY: 75 years postmenopausal female   OTHER RISK FACTORS:  Prior fracture as a result of minor injury, PPI therapy, SSRI therapy, Arimidex therapy      PHARMACOLOGIC THERAPY FOR OSTEOPOROSIS:  None      TECHNIQUE: Bone densitometry was performed using a Hologic Horizon A  bone densitometer  Regions of interest appear properly placed          COMPARISON: There are no prior DXA studies performed on this unit for comparison      RESULTS:      LUMBAR SPINE  Level: L1, L3  (L2, L4 vertebrae excluded from analysis due to local structural abnormalities or artifact) :   BMD:  0 766  gm/cm2   T-score: -2 2         LEFT  TOTAL HIP:   BMD:  0 752  gm/cm2   T-score:  -1 6     LEFT  FEMORAL NECK:   BMD:  0 664  gm/cm2   T score: -1 7      RIGHT  FOREARM:    33% RADIUS BMD:  0 526  gm/cm2  T-score:  -2 7         IMPRESSION:     1  Osteoporosis  [Based on the right radius]     2  The 10 year risk of hip fracture is 2 1% with the 10 year risk of major osteoporotic fracture being 15% as calculated by the [facility] fracture risk assessment tool (FRAX, which is based on data generated by the [facility]   for Metabolic Bone Diseases)  3   The current NOF guidelines recommend treating patients with a T-score of -2 5 or less in the lumbar spine or hips, or in post-menopausal women and men over the age of 48 with low bone mass (osteopenia) and a FRAX 10 year risk score of >3% for hip   fracture and/or >20% for major osteoporotic fracture      4  The NOF recommends follow-up DXA in 1-2 years after initiating therapy for osteoporosis and every 2 years thereafter   More frequent evaluation is appropriate for patients with conditions associated with rapid bone loss, such as glucocorticoid   therapy  The interval between DXA screenings may be longer for individuals without major risk factors and initial T-score in the normal or upper low bone mass range  Impression:  1  Osteoporosis, unspecified osteoporosis type, unspecified pathological fracture presence       Plan:    Luz Walker was seen today for osteoporosis  Diagnoses and all orders for this visit:    Osteoporosis, unspecified osteoporosis type, unspecified pathological fracture presence  -     Ambulatory Referral to Endocrinology  -     Comprehensive metabolic panel; Future  -     Vitamin D 25 hydroxy; Future  -     PTH, intact; Future  -     alendronate (FOSAMAX) 70 mg tablet; Take 1 tablet (70 mg total) by mouth every 7 days  -     DXA bone density spine hip and pelvis; Future      1  Osteoporosis, unspecified osteoporosis type, unspecified pathological fracture presence  - Ambulatory Referral to Endocrinology    PLAN   1  Osteoporosis- age related with Hx of fracture of left wrist after a fall from standing height  Patient also has other Pmhx of several other fractures few years ago, Most recent fracture 02/23 of left wrist  Risk factor for osteoporosis includes PPI use, Female, Age, Post menopausal and Estrogen blocker use  Reviewed her DXA scan and since lowest t score of -2 7 at forearm with osteopenia at rest sites -2 2 at spine  Discussed could start with oral bisphosphonate Fosamax 70mg once a week  Discussed to take this on empty stomach, with glass of water and sit upright for 30 mins, Discussed side effects of AFF, ONJ and CKD  Will get baseline labs for CMP, PTH and VitD  Will check PTH since T score much lower at forearm compared to other sites  Take VitD 1000IU and calcium 1200mg daily  Take fall precations and do weight baring exercises  Repeat DXA in 1 year       Discussed use of PPI can affect calcium/vitD absorption and increase risk of further bone demineralization, discuss with PCP about alternative  Also discsused Anastrozole causing OP d/t estrogen blocker but since on this for breast cancer risk of breast cancer >OP  Therefore do not stop this without talking to oncologist      RTC in 1 year     Will     Discussed with the patient and all questioned fully answered  She will call me if any problems arise          Kendy Teixeira MD

## 2023-05-01 ENCOUNTER — APPOINTMENT (OUTPATIENT)
Dept: LAB | Facility: HOSPITAL | Age: 68
End: 2023-05-01
Attending: INTERNAL MEDICINE

## 2023-05-01 DIAGNOSIS — M81.0 OSTEOPOROSIS, UNSPECIFIED OSTEOPOROSIS TYPE, UNSPECIFIED PATHOLOGICAL FRACTURE PRESENCE: ICD-10-CM

## 2023-05-01 DIAGNOSIS — K74.3 PRIMARY BILIARY CIRRHOSIS (HCC): Primary | ICD-10-CM

## 2023-05-01 DIAGNOSIS — K74.3 PRIMARY BILIARY CIRRHOSIS (HCC): ICD-10-CM

## 2023-05-01 LAB
25(OH)D3 SERPL-MCNC: 32.2 NG/ML (ref 30–100)
ALBUMIN SERPL BCP-MCNC: 4.1 G/DL (ref 3.5–5)
ALP SERPL-CCNC: 130 U/L (ref 46–116)
ALT SERPL W P-5'-P-CCNC: 25 U/L (ref 12–78)
ANION GAP SERPL CALCULATED.3IONS-SCNC: 3 MMOL/L (ref 4–13)
AST SERPL W P-5'-P-CCNC: 18 U/L (ref 5–45)
BILIRUB SERPL-MCNC: 0.39 MG/DL (ref 0.2–1)
BUN SERPL-MCNC: 14 MG/DL (ref 5–25)
CALCIUM SERPL-MCNC: 9.8 MG/DL (ref 8.3–10.1)
CHLORIDE SERPL-SCNC: 104 MMOL/L (ref 96–108)
CO2 SERPL-SCNC: 27 MMOL/L (ref 21–32)
CREAT SERPL-MCNC: 0.86 MG/DL (ref 0.6–1.3)
GFR SERPL CREATININE-BSD FRML MDRD: 69 ML/MIN/1.73SQ M
GLUCOSE P FAST SERPL-MCNC: 120 MG/DL (ref 65–99)
POTASSIUM SERPL-SCNC: 3.9 MMOL/L (ref 3.5–5.3)
PROT SERPL-MCNC: 9 G/DL (ref 6.4–8.4)
PTH-INTACT SERPL-MCNC: 33 PG/ML (ref 18.4–80.1)
SODIUM SERPL-SCNC: 134 MMOL/L (ref 135–147)

## 2023-05-03 DIAGNOSIS — S62.102B OPEN FRACTURE OF LEFT WRIST, INITIAL ENCOUNTER: Primary | ICD-10-CM

## 2023-05-22 ENCOUNTER — OFFICE VISIT (OUTPATIENT)
Dept: OBGYN CLINIC | Facility: HOSPITAL | Age: 68
End: 2023-05-22

## 2023-05-22 ENCOUNTER — HOSPITAL ENCOUNTER (OUTPATIENT)
Dept: RADIOLOGY | Facility: HOSPITAL | Age: 68
Discharge: HOME | End: 2023-05-22
Attending: ORTHOPAEDIC SURGERY

## 2023-05-22 VITALS
HEART RATE: 67 BPM | BODY MASS INDEX: 30.29 KG/M2 | DIASTOLIC BLOOD PRESSURE: 81 MMHG | WEIGHT: 193 LBS | SYSTOLIC BLOOD PRESSURE: 138 MMHG | HEIGHT: 67 IN

## 2023-05-22 DIAGNOSIS — S62.102B OPEN FRACTURE OF LEFT WRIST, INITIAL ENCOUNTER: ICD-10-CM

## 2023-05-22 DIAGNOSIS — Z87.81 S/P ORIF (OPEN REDUCTION INTERNAL FIXATION) FRACTURE: ICD-10-CM

## 2023-05-22 DIAGNOSIS — S62.102B OPEN FRACTURE OF LEFT WRIST, INITIAL ENCOUNTER: Primary | ICD-10-CM

## 2023-05-22 DIAGNOSIS — Z98.890 S/P ORIF (OPEN REDUCTION INTERNAL FIXATION) FRACTURE: ICD-10-CM

## 2023-05-22 NOTE — PROGRESS NOTES
Assessment:   Diagnosis ICD-10-CM Associated Orders   1  Open fracture of left wrist, initial encounter  S62 102B       2  S/P ORIF (open reduction internal fixation) fracture  Z98 890     Z87 81           Plan:  • New xrays of the left wrist were obtained   • On exam, she maintains good ROM of the wrist  • She can start gripping and lifting gradually  Start with her 16oz water bottles to 2lbs weights, increasing her lever arm can  Walking for her lower body  • Continue care with her endocrinologist      To do next visit:  Return if symptoms worsen or fail to improve  The above stated was discussed in layman's terms and the patient expressed understanding  All questions were answered to the patient's satisfaction  Scribe Attestation    I,:  Erik Scherer am acting as a scribe while in the presence of the attending physician :       I,:  Melania Beach MD personally performed the services described in this documentation    as scribed in my presence :             Subjective:   Davied Closs is a 75 y o  female who presents today for 6-week follow-up for her left wrist   Patient is 3 months status post ORIF left radius, 2/24/2023  She has been in formal occupational therapy and notes that she is back to driving  Review of systems negative unless otherwise specified in HPI  Review of Systems   Constitutional: Negative for chills, fever and unexpected weight change  HENT: Negative for hearing loss, nosebleeds and sore throat  Eyes: Negative for pain, redness and visual disturbance  Respiratory: Negative for cough, shortness of breath and wheezing  Cardiovascular: Negative for chest pain, palpitations and leg swelling  Gastrointestinal: Negative for abdominal pain and nausea  Genitourinary: Negative for dyspareunia, dysuria and frequency  Skin: Negative for rash and wound  Neurological: Negative for dizziness, numbness and headaches     Psychiatric/Behavioral: Negative for decreased concentration and suicidal ideas  The patient is not nervous/anxious  Past Medical History:   Diagnosis Date   • Anxiety    • Morales esophagus    • Breast cancer ([facility] [address] [facility] ) 09/24/2020    right mucinous Ca   • Colon polyp    • Gastroesophageal reflux disease without esophagitis 5/14/2019   • GERD (gastroesophageal reflux disease)    • History of gallstones    • Hypertension    • Personal history of colonic polyps 5/14/2019   • Primary biliary cirrhosis ([facility] ) 5/14/2019   • Seasonal allergies        Past Surgical History:   Procedure Laterality Date   • APPENDECTOMY     • BREAST BIOPSY Right 09/24/2020    stereo-  mucinous ca   • BREAST BIOPSY Left 10/06/2020    us bx- neg   • BREAST LUMPECTOMY Right 12/22/2020    Procedure: BREAST NEEDLE LOCALIZED LUMPECTOMY (NEEDLE LOC AT 1230); Surgeon: Deidre Ragsdale MD;  [facility];  Service: Surgical Oncology   • COLONOSCOPY  06/24/2020     2 adenomas  Five year recall advised   • INTRAOPERATIVE RADIATION THERAPY (IORT) Right 12/22/2020    Procedure: BREAST INTRAOPERATIVE RADIATION THERAPY (IORT) BY DR Ranulfo Vega;  Surgeon: Deidre Ragsdale MD;  [facility];  Service: Surgical Oncology   • LYMPH NODE BIOPSY Right 12/22/2020    Procedure: SENTINEL LYMPH NODE BIOPSY; LYMPHATIC MAPPING WITH BLUE DYE AND RADIOACTIVE DYE (INJECT AT 1400 BY DR KNOX IN THE OR); Surgeon: Deidre Ragsdale MD;  [facility];  Service: Surgical Oncology   • MAMMO NEEDLE LOCALIZATION RIGHT (ALL INC) Right 12/22/2020   • MAMMO STEREOTACTIC BREAST BIOPSY RIGHT (ALL INC) Right 09/24/2020   • ORIF WRIST FRACTURE Left 2/24/2023    Procedure: OPEN REDUCTION W/ INTERNAL FIXATION (ORIF) RADIUS / Jaja Mould (WRIST), irrigation;  Surgeon: Thierno Palma MD;  [facility];  Service: Orthopedics   • TUBAL LIGATION     • UPPER GASTROINTESTINAL ENDOSCOPY  06/24/2019    irregular z-line    biopsies negative for Morales's       Family History   Problem Relation Age of Onset   • No Known Problems Mother    • Lung cancer Father 48   • No Known Problems Sister    • No Known Problems Sister    • No Known Problems Brother    • No Known Problems Maternal Grandmother    • No Known Problems Maternal Grandfather    • No Known Problems Paternal Grandmother    • No Known Problems Paternal Grandfather    • No Known Problems Maternal Aunt    • No Known Problems Paternal Aunt    • No Known Problems Paternal Aunt    • No Known Problems Daughter    • Colon cancer Neg Hx    • Colon polyps Neg Hx        Social History     Occupational History   • Not on file   Tobacco Use   • Smoking status: Never   • Smokeless tobacco: Never   Vaping Use   • Vaping Use: Never used   Substance and Sexual Activity   • Alcohol use:  Yes     Alcohol/week: 8 0 standard drinks     Types: 8 Glasses of wine per week   • Drug use: Never   • Sexual activity: Not Currently         Current Outpatient Medications:   •  alendronate (FOSAMAX) 70 mg tablet, Take 1 tablet (70 mg total) by mouth every 7 days, Disp: 52 tablet, Rfl: 1  •  anastrozole (ARIMIDEX) 1 mg tablet, Take 1 tablet (1 mg total) by mouth daily, Disp: 90 tablet, Rfl: 3  •  Calcium Carbonate-Vitamin D (Calcium 500 + D) 500-125 MG-UNIT TABS, Take by mouth daily, Disp: , Rfl:   •  CRANBERRY EXTRACT PO, Take by mouth, Disp: , Rfl:   •  loratadine-pseudoephedrine (CLARITIN-D 24-HOUR)  mg per 24 hr tablet, Take 1 tablet by mouth as needed for allergies, Disp: , Rfl:   •  Multiple Vitamins-Minerals (MULTIVITAMIN WOMEN 50+ PO), Take by mouth daily , Disp: , Rfl:   •  pantoprazole (PROTONIX) 40 mg tablet, Take 1 tablet (40 mg total) by mouth 2 (two) times a day, Disp: 180 tablet, Rfl: 12  •  sertraline (ZOLOFT) 100 mg tablet, Take 100 mg by mouth daily, Disp: , Rfl:   •  Turmeric Curcumin 500 MG CAPS, Take by mouth daily, Disp: , Rfl:   •  ursodiol (ACTIGALL) 500 MG tablet, Take 2 tablets (1,000 mg total) by mouth 2 (two) times a day, Disp: 360 tablet, Rfl: 1  •  zolpidem (AMBIEN) 10 mg tablet, Take 10 mg "by mouth daily at bedtime, Disp: , Rfl:   •  albuterol (PROVENTIL HFA,VENTOLIN HFA) 90 mcg/act inhaler, Inhale 1 puff every 4 (four) hours as needed for wheezing (Patient not taking: Reported on 4/27/2023), Disp: , Rfl:   •  Azelastine HCl 137 MCG/SPRAY SOLN, into each nostril daily as needed  (Patient not taking: Reported on 4/27/2023), Disp: , Rfl:     Allergies   Allergen Reactions   • Aspirin GI Intolerance            Vitals:    05/22/23 1448   BP: 138/81   Pulse: 67       Objective:                    Ortho Exam     Left hand: Well healed volar wrist scar   30 degrees of extension  30 degrees of flexion  Full pronation and supination   Full composite fist  Opposition intact  Sensation intact to light touch   Brisk capillary refill       Diagnostics, reviewed and taken today if performed as documented: The attending physician has personally reviewed the pertinent films in PACS and interpretation is as follows:  X-rays left wrist: hardware is intact       Procedures, if performed today:    Procedures    None performed      Portions of the record may have been created with voice recognition software  Occasional wrong word or \"sound a like\" substitutions may have occurred due to the inherent limitations of voice recognition software  Read the chart carefully and recognize, using context, where substitutions have occurred    "

## 2023-06-12 DIAGNOSIS — K74.3 PRIMARY BILIARY CIRRHOSIS (HCC): ICD-10-CM

## 2023-06-12 RX ORDER — URSODIOL 500 MG/1
TABLET, FILM COATED ORAL
Qty: 360 TABLET | Refills: 1 | Status: SHIPPED | OUTPATIENT
Start: 2023-06-12

## 2023-07-27 ENCOUNTER — APPOINTMENT (OUTPATIENT)
Dept: LAB | Facility: HOSPITAL | Age: 68
End: 2023-07-27
Payer: COMMERCIAL

## 2023-07-27 DIAGNOSIS — K74.3 PRIMARY BILIARY CIRRHOSIS (HCC): ICD-10-CM

## 2023-07-27 LAB
ALBUMIN SERPL BCP-MCNC: 4 G/DL (ref 3.5–5)
ALP SERPL-CCNC: 124 U/L (ref 46–116)
ALT SERPL W P-5'-P-CCNC: 23 U/L (ref 12–78)
AST SERPL W P-5'-P-CCNC: 15 U/L (ref 5–45)
BILIRUB DIRECT SERPL-MCNC: 0.2 MG/DL (ref 0–0.2)
BILIRUB SERPL-MCNC: 0.6 MG/DL (ref 0.2–1)
PROT SERPL-MCNC: 8.5 G/DL (ref 6.4–8.4)

## 2023-07-27 PROCEDURE — 80076 HEPATIC FUNCTION PANEL: CPT

## 2023-07-27 PROCEDURE — 36415 COLL VENOUS BLD VENIPUNCTURE: CPT

## 2023-07-28 ENCOUNTER — TELEPHONE (OUTPATIENT)
Dept: GASTROENTEROLOGY | Facility: CLINIC | Age: 68
End: 2023-07-28

## 2023-07-28 DIAGNOSIS — K74.3 PRIMARY BILIARY CIRRHOSIS (HCC): Primary | ICD-10-CM

## 2023-08-02 ENCOUNTER — TELEPHONE (OUTPATIENT)
Dept: HEMATOLOGY ONCOLOGY | Facility: CLINIC | Age: 68
End: 2023-08-02

## 2023-09-16 ENCOUNTER — TELEPHONE (OUTPATIENT)
Dept: HEMATOLOGY ONCOLOGY | Facility: CLINIC | Age: 68
End: 2023-09-16

## 2023-09-16 NOTE — TELEPHONE ENCOUNTER
I called [facility] regarding an appointment that they have scheduled with Dr. Amber Ford scheduled on 10/24/23     I left a voicemail explaining to patient that this appointment will need to be rescheduled due to a change in the providers schedule. Patient was advised to call [facility] to reschedule. A [employer] message (if applicable) has been sent to patient relaying the above information and advising patient to call [facility] and reschedule their appointment. Patient can schedule [facility] with any [facility].

## 2023-10-12 ENCOUNTER — HOSPITAL ENCOUNTER (OUTPATIENT)
Dept: MAMMOGRAPHY | Facility: CLINIC | Age: 68
Discharge: HOME | End: 2023-10-12
Payer: COMMERCIAL

## 2023-10-12 VITALS — WEIGHT: 185 LBS | HEIGHT: 67 IN | BODY MASS INDEX: 29.03 KG/M2

## 2023-10-12 DIAGNOSIS — R92.8 OTHER ABNORMAL AND INCONCLUSIVE FINDINGS ON DIAGNOSTIC IMAGING OF BREAST: ICD-10-CM

## 2023-10-12 PROCEDURE — 77066 DX MAMMO INCL CAD BI: CPT

## 2023-10-12 PROCEDURE — G0279 TOMOSYNTHESIS, MAMMO: HCPCS

## 2023-10-12 NOTE — PROGRESS NOTES
Met with patient and   regarding recommendation for;    ___x__ RIGHT ______LEFT      _____Ultrasound guided  ____x__Stereotactic breast biopsy. __X___Verbalized understanding.       Blood thinners:  No: __x___ Yes: ______ What:                 Biopsy teaching sheet given:  Yes: ___X___ No: ________    Pt given contact information and adv to call with any questions/needs

## 2023-10-16 ENCOUNTER — TELEPHONE (OUTPATIENT)
Dept: HEMATOLOGY ONCOLOGY | Facility: CLINIC | Age: 68
End: 2023-10-16

## 2023-10-16 NOTE — TELEPHONE ENCOUNTER
[facility]  DR enrique DELGADO   Who are you speaking with? Patient   If it is not the patient, are they listed on an active communication consent form? N/A   Is this a [facility] or DR enrique DELGADO [facility]   Which provider is patient currently scheduled or established with? Dr. Yvette Lucia   What is the original appointment date and time? 10/24/23 1:40PM   At which location is the appointment scheduled to take place? [facility]   Which provider is the patient transitioning care to? Dr. Saundra Clinton   What is the new appointment date and time? 10/24/23 2PM   At which location is the new appointment scheduled to take place? [place]   What is the reason for this change?  Provider

## 2023-10-18 ENCOUNTER — DOCUMENTATION (OUTPATIENT)
Age: 68
End: 2023-10-18

## 2023-10-18 ENCOUNTER — TELEPHONE (OUTPATIENT)
Age: 68
End: 2023-10-18

## 2023-10-18 NOTE — TELEPHONE ENCOUNTER
Called and left message on patient's voicemail letting her know that we need to reschedule her 10/24 appt because Dr. Peyton Hernandez is rounding. I rescheduled her to 11/2 at 8am and asked that she call me back on my teams number to let me know that appointment time/day is ok.

## 2023-11-09 ENCOUNTER — OFFICE VISIT (OUTPATIENT)
Age: 68
End: 2023-11-09
Payer: COMMERCIAL

## 2023-11-09 VITALS
WEIGHT: 191 LBS | OXYGEN SATURATION: 99 % | SYSTOLIC BLOOD PRESSURE: 141 MMHG | BODY MASS INDEX: 29.98 KG/M2 | TEMPERATURE: 98.6 F | HEART RATE: 86 BPM | HEIGHT: 67 IN | DIASTOLIC BLOOD PRESSURE: 87 MMHG

## 2023-11-09 DIAGNOSIS — C50.411 MALIGNANT NEOPLASM OF UPPER-OUTER QUADRANT OF RIGHT BREAST IN FEMALE, ESTROGEN RECEPTOR POSITIVE: Primary | ICD-10-CM

## 2023-11-09 DIAGNOSIS — Z79.811 USE OF ANASTROZOLE (ARIMIDEX): ICD-10-CM

## 2023-11-09 DIAGNOSIS — Z17.0 MALIGNANT NEOPLASM OF UPPER-OUTER QUADRANT OF RIGHT BREAST IN FEMALE, ESTROGEN RECEPTOR POSITIVE: Primary | ICD-10-CM

## 2023-11-09 PROCEDURE — 99214 OFFICE O/P EST MOD 30 MIN: CPT | Performed by: INTERNAL MEDICINE

## 2023-11-09 NOTE — PATIENT INSTRUCTIONS
There are no concerning findings on exam today. Please call me when you need a refill on the anastrozole. I will follow up on the biopsy results. If all negative, I will see you back in one year.

## 2023-11-09 NOTE — PROGRESS NOTES
[address] [facility] [place] [address]  [place] [address]   [phone]  [phone]     Date of Visit: 11/9/2023  Name: Jackie Navarro   YOB: 1955         Assessment/Plan  70year-old postmenopausal woman with stage IA right breast cancer, grade 1 with mucinous histology, % positive, OR 25% positive, HER2 negative disease. She underwent lumpectomy and sentinel lymph node biopsy, resulting in TAMARA. Since January 2021, she has been on adjuvant hormonal therapy with anastrozole with excellent tolerance. She has no evidence recurrent disease, based on her symptoms and physical examinations. Her last mammogram was from October 12 which shows indeterminate calcifications along the posterior aspect of the lumpectomy bed. Stereotactic guided biopsy is recommended and is scheduled for 11/20/2023. I will follow up on the results for this. For now, she should stay on anastrazole for another 2 years and she has been advised to call when she is due for a refill. As for the concern on the left breast, I do not palpate any masses or see any concerning findings on exam. If pain persists /worsens or she notes a mass, she should call us. Return in about 1 year (around 11/9/2024). The patient had many questions that were answered to their apparent satisfaction. Patient has been strongly urged to call with any questions or concerns. I spent 45 minutes in chart review, face to face counseling, coordination of care, and documentation          Oncology History   Malignant neoplasm of upper-outer quadrant of right breast in female, estrogen receptor positive    9/24/2020 Biopsy    Right breast stereotactic biopsy  10 o'clock,   Mucinous carcinoma, 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, both 1.1 mm, 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. Grade 1    OR 25  HER2 1+  Lymphovascular invasion not identified    Concordant.  Malignancy appears unifocal; calcifications span 2.3cm. US right axila has not been performed. Left breast US recommended. 10/6/2020 B/L US - no right axillary adenopathy, benign findings in left breast.     12/22/2020 Surgery    Right breast needle localized lumpectomy with sentinel lymph node biopsy  Mucinous carcinoma  Grade 1  1.1 mm  Extensive DCIS (at least 4 cm)  DCIS less than 1 mm form lateral posterior lumpectomy margin  0/1 Lymph node  Anatomic/Prognostic Stage IA     12/22/2020 - 12/22/2020 Radiation    (Treatments not in [address])  IORT with Dr. Tonio Siddiqui [employer] [facility] Starting  Ending  Elapsed  Fraction Total  Overlap Site Overlap         Date Date Days Dose Dose   Dose    IORT 50 kVp  Rt Breast   12/22/20 12/22/20  0  2000 cGy  2000 cGy                                 1/15/2021 -  Hormone Therapy    Anastrozole 1 mg daily    Dr. Mohit Acosta:   Sherman Augustine is a 70 y.o. female  who is here today for follow-up of breast cancer. She is a postmenopausal woman who was found to have radiographic abnormality in her right breast for which she underwent biopsy in September 24, 2020. She had invasive mucinous carcinoma, grade 1. This was % positive, [place] 25% positive, HER2 negative disease. Subsequently, she underwent lumpectomy and sentinel lymph node biopsy by Dr. Elaine Rodrigues in December 22, 2020 which showed 2 mm and 1.1 mm of mucinous carcinoma, grade 1. There was no evidence of lymphovascular invasion. 1 sentinel lymph node was negative for metastatic disease. She previously followed with Dr. Rachael Contreras, and is now transferring care to myself. She is currently on hormonal therapy with anastrozole 1 mg daily since January 2021 with plans to continue this for total of 5 years. Subjective    She is here today with her . She complains of discomfort in her left breast that she has noted a few weeks ago.   She has been lifting weights for about 6 months now, and attributed the pain to musculoskeletal.  She has not felt any lumps in her breast.  She has not noticed any skin changes or discharge. She had no side effects with anastrozole at this time. Initially  with headaches and hair thinning, both of which have resolved since. REVIEW OF SYSTEMS:  14 point review of systems is otherwise negative.       Current Outpatient Medications:     alendronate (FOSAMAX) 70 mg tablet, Take 1 tablet (70 mg total) by mouth every 7 days, Disp: 52 tablet, Rfl: 1    anastrozole (ARIMIDEX) 1 mg tablet, Take 1 tablet (1 mg total) by mouth daily, Disp: 90 tablet, Rfl: 3    Azelastine HCl 137 MCG/SPRAY SOLN, into each nostril daily as needed, Disp: , Rfl:     Calcium Carbonate-Vitamin D (Calcium 500 + D) 500-125 MG-UNIT TABS, Take by mouth daily, Disp: , Rfl:     CRANBERRY EXTRACT PO, Take by mouth, Disp: , Rfl:     loratadine-pseudoephedrine (CLARITIN-D 24-HOUR)  mg per 24 hr tablet, Take 1 tablet by mouth as needed for allergies, Disp: , Rfl:     Multiple Vitamins-Minerals (MULTIVITAMIN WOMEN 50+ PO), Take by mouth daily , Disp: , Rfl:     pantoprazole (PROTONIX) 40 mg tablet, Take 1 tablet (40 mg total) by mouth 2 (two) times a day, Disp: 180 tablet, Rfl: 12    sertraline (ZOLOFT) 100 mg tablet, Take 100 mg by mouth daily, Disp: , Rfl:     Turmeric Curcumin 500 MG CAPS, Take by mouth daily, Disp: , Rfl:     ursodiol (ACTIGALL) 500 MG tablet, take 2 tablets by mouth twice a day, Disp: 360 tablet, Rfl: 1    zolpidem (AMBIEN) 10 mg tablet, Take 10 mg by mouth daily at bedtime, Disp: , Rfl:     albuterol (PROVENTIL HFA,VENTOLIN HFA) 90 mcg/act inhaler, Inhale 1 puff every 4 (four) hours as needed for wheezing (Patient not taking: Reported on 4/27/2023), Disp: , Rfl:      Allergies   Allergen Reactions    Aspirin GI Intolerance        ACTIVE PROBLEMS:  Patient Active Problem List   Diagnosis    Personal history of colonic polyps    Gastroesophageal reflux disease without esophagitis Primary biliary cirrhosis (HCC)    Morales's esophagus without dysplasia    Malignant neoplasm of upper-outer quadrant of right breast in female, estrogen receptor positive     Use of anastrozole (Arimidex)    COVID-19    Anxiety    Depression    Fracture of distal radius and ulna, left, open type I or II, initial encounter    Hypokalemia          Past Medical History:   Diagnosis Date    Anxiety     Morales esophagus     Breast cancer ([address]) 09/24/2020    right mucinous Ca    Colon polyp     Gastroesophageal reflux disease without esophagitis 5/14/2019    GERD (gastroesophageal reflux disease)     History of gallstones     Hypertension     Personal history of colonic polyps 5/14/2019    Primary biliary cirrhosis ([address]) 5/14/2019    Seasonal allergies         Past Surgical History:   Procedure Laterality Date    APPENDECTOMY      BREAST BIOPSY Right 09/24/2020    stereo-  mucinous ca    BREAST BIOPSY Left 10/06/2020    us bx- neg    BREAST LUMPECTOMY Right 12/22/2020    Procedure: BREAST NEEDLE LOCALIZED LUMPECTOMY (NEEDLE LOC AT 1230); Surgeon: Tim Rivers MD;  [facility]: [facility];  Service: Surgical Oncology    COLONOSCOPY  06/24/2020     2 adenomas. Five year recall advised    INTRAOPERATIVE RADIATION THERAPY (IORT) Right 12/22/2020    Procedure: BREAST INTRAOPERATIVE RADIATION THERAPY (IORT) BY DR Macario Peñaloza;  Surgeon: Tim Rivers MD;  [facility];  Service: Surgical Oncology    LYMPH NODE BIOPSY Right 12/22/2020    Procedure: SENTINEL LYMPH NODE BIOPSY; LYMPHATIC MAPPING WITH BLUE DYE AND RADIOACTIVE DYE (INJECT AT 1400 BY DR KNOX IN THE OR);   Surgeon: Tim Rivers MD;  [facility];  Service: Surgical Oncology    MAMMO NEEDLE LOCALIZATION RIGHT (ALL INC) Right 12/22/2020    MAMMO STEREOTACTIC BREAST BIOPSY RIGHT (ALL INC) Right 09/24/2020    ORIF WRIST FRACTURE Left 2/24/2023    Procedure: OPEN REDUCTION W/ INTERNAL FIXATION (ORIF) RADIUS / ULNA (WRIST), irrigation;  Surgeon: Brandee Guo MD; Location:  MAIN OR;  Service: Orthopedics    TUBAL LIGATION      UPPER GASTROINTESTINAL ENDOSCOPY  06/24/2019    irregular z-line. biopsies negative for Morales's        Social History     Socioeconomic History    Marital status: /Civil Union     Spouse name: None    Number of children: None    Years of education: None    Highest education level: None   Occupational History    None   Tobacco Use    Smoking status: Never    Smokeless tobacco: Never   Vaping Use    Vaping Use: Never used   Substance and Sexual Activity    Alcohol use: Yes     Alcohol/week: 8.0 standard drinks of alcohol     Types: 8 Glasses of wine per week    Drug use: Never    Sexual activity: Not Currently   Other Topics Concern    None   Social History Narrative    None     Social Determinants of Health     Financial Resource Strain: Not on file   Food Insecurity: No Food Insecurity (2/24/2023)    Hunger Vital Sign     Worried About Running Out of Food in the Last Year: Never true     Ran Out of Food in the Last Year: Never true   Transportation Needs: No Transportation Needs (2/24/2023)    PRAPARE - Transportation     Lack of Transportation (Medical): No     Lack of Transportation (Non-Medical):  No   Physical Activity: Not on file   Stress: Not on file   Social Connections: Not on file   Intimate Partner Violence: Not on file   Housing Stability: Low Risk  (2/24/2023)    Housing Stability Vital Sign     Unable to Pay for Housing in the Last Year: No     Number of Places Lived in the Last Year: 1     Unstable Housing in the Last Year: No        Family History   Problem Relation Age of Onset    No Known Problems Mother     Lung cancer Father 48    No Known Problems Sister     No Known Problems Sister     No Known Problems Brother     No Known Problems Maternal Grandmother     No Known Problems Maternal Grandfather     No Known Problems Paternal Grandmother     No Known Problems Paternal Grandfather     No Known Problems Maternal Aunt No Known Problems Paternal Aunt     No Known Problems Paternal Aunt     No Known Problems Daughter     Colon cancer Neg Hx     Colon polyps Neg Hx         Objective        Physical Exam  ECOG performance status: 0  Blood pressure 141/87, pulse 86, temperature 98.6 °F (37 °C), temperature source Temporal, height 5' 7" (1.702 m), weight 86.6 kg (191 lb), SpO2 99 %. General appearance: Not in acute distress, well appearing and well nourished. Alert and oriented. Normal breath sounds bilaterally. Clear to auscultation without any added sounds  Heart sounds are normal.  No murmurs noted. Abdomen is soft and nontender. No rebound. Extremities without any edema. Breast exam shows the right breast lumpectomy site. There are no palpable masses. Normal skin. Normal nipple areolar complex. There are no palpable cervical or axillary lymphadenopathy. No tenderness on exam.      I reviewed lab data in the chart as noted above.   Additional labs as noted below    WBC   Date Value Ref Range Status   02/25/2023 10.43 (H) 4.31 - 10.16 Thousand/uL Final   02/24/2023 9.05 4.31 - 10.16 Thousand/uL Final   02/23/2023 9.80 4.31 - 10.16 Thousand/uL Final     Hemoglobin   Date Value Ref Range Status   02/25/2023 11.5 11.5 - 15.4 g/dL Final   02/24/2023 12.4 11.5 - 15.4 g/dL Final   02/23/2023 12.6 11.5 - 15.4 g/dL Final     Platelets   Date Value Ref Range Status   02/25/2023 267 149 - 390 Thousands/uL Final   02/24/2023 281 149 - 390 Thousands/uL Final   02/23/2023 313 149 - 390 Thousands/uL Final     MCV   Date Value Ref Range Status   02/25/2023 93 82 - 98 fL Final   02/24/2023 91 82 - 98 fL Final   02/23/2023 93 82 - 98 fL Final      Potassium   Date Value Ref Range Status   05/01/2023 3.9 3.5 - 5.3 mmol/L Final   02/25/2023 4.3 3.5 - 5.3 mmol/L Final   02/24/2023 4.2 3.5 - 5.3 mmol/L Final     Chloride   Date Value Ref Range Status   05/01/2023 104 96 - 108 mmol/L Final   02/25/2023 105 96 - 108 mmol/L Final 02/24/2023 101 96 - 108 mmol/L Final     CO2   Date Value Ref Range Status   05/01/2023 27 21 - 32 mmol/L Final   02/25/2023 25 21 - 32 mmol/L Final   02/24/2023 23 21 - 32 mmol/L Final     BUN   Date Value Ref Range Status   05/01/2023 14 5 - 25 mg/dL Final   02/25/2023 15 5 - 25 mg/dL Final   02/24/2023 11 5 - 25 mg/dL Final     Creatinine   Date Value Ref Range Status   05/01/2023 0.86 0.60 - 1.30 mg/dL Final     Comment:     Standardized to IDMS reference method   02/25/2023 0.57 (L) 0.60 - 1.30 mg/dL Final     Comment:     Standardized to IDMS reference method   02/24/2023 0.59 (L) 0.60 - 1.30 mg/dL Final     Comment:     Standardized to IDMS reference method     Glucose   Date Value Ref Range Status   02/25/2023 113 65 - 140 mg/dL Final     Comment:     If the patient is fasting, the ADA then defines impaired fasting glucose as > 100 mg/dL and diabetes as > or equal to 123 mg/dL. Specimen collection should occur prior to Sulfasalazine administration due to the potential for falsely depressed results. Specimen collection should occur prior to Sulfapyridine administration due to the potential for falsely elevated results. 02/24/2023 125 65 - 140 mg/dL Final     Comment:     If the patient is fasting, the ADA then defines impaired fasting glucose as > 100 mg/dL and diabetes as > or equal to 123 mg/dL. Specimen collection should occur prior to Sulfasalazine administration due to the potential for falsely depressed results. Specimen collection should occur prior to Sulfapyridine administration due to the potential for falsely elevated results. 02/23/2023 105 65 - 140 mg/dL Final     Comment:     If the patient is fasting, the ADA then defines impaired fasting glucose as > 100 mg/dL and diabetes as > or equal to 123 mg/dL. Specimen collection should occur prior to Sulfasalazine administration due to the potential for falsely depressed results.  Specimen collection should occur prior to Sulfapyridine administration due to the potential for falsely elevated results. Calcium   Date Value Ref Range Status   05/01/2023 9.8 8.3 - 10.1 mg/dL Final   02/25/2023 8.7 8.4 - 10.2 mg/dL Final   02/24/2023 8.3 (L) 8.4 - 10.2 mg/dL Final     Albumin   Date Value Ref Range Status   07/27/2023 4.0 3.5 - 5.0 g/dL Final   05/01/2023 4.1 3.5 - 5.0 g/dL Final   02/25/2023 3.8 3.5 - 5.0 g/dL Final     Total Bilirubin   Date Value Ref Range Status   07/27/2023 0.60 0.20 - 1.00 mg/dL Final     Comment:     Use of this assay is not recommended for patients undergoing treatment with eltrombopag due to the potential for falsely elevated results. 05/01/2023 0.39 0.20 - 1.00 mg/dL Final     Comment:     Use of this assay is not recommended for patients undergoing treatment with eltrombopag due to the potential for falsely elevated results. 02/25/2023 0.56 0.20 - 1.00 mg/dL Final     Alkaline Phosphatase   Date Value Ref Range Status   07/27/2023 124 (H) 46 - 116 U/L Final   05/01/2023 130 (H) 46 - 116 U/L Final   02/25/2023 69 34 - 104 U/L Final     AST   Date Value Ref Range Status   07/27/2023 15 5 - 45 U/L Final     Comment:     Specimen collection should occur prior to Sulfasalazine and/or Sulfapyridine administration due to the potential for falsely depressed results. 05/01/2023 18 5 - 45 U/L Final     Comment:     Specimen collection should occur prior to Sulfasalazine administration due to the potential for falsely depressed results. 02/25/2023 14 13 - 39 U/L Final     Comment:     Specimen collection should occur prior to Sulfasalazine administration due to the potential for falsely depressed results. ALT   Date Value Ref Range Status   07/27/2023 23 12 - 78 U/L Final     Comment:     Specimen collection should occur prior to Sulfasalazine and/or Sulfapyridine administration due to the potential for falsely depressed results.     05/01/2023 25 12 - 78 U/L Final     Comment:     Specimen collection should occur prior to Sulfasalazine and/or Sulfapyridine administration due to the potential for falsely depressed results. 02/25/2023 11 7 - 52 U/L Final     Comment:     Specimen collection should occur prior to Sulfasalazine administration due to the potential for falsely depressed results. No results found for: "LDH"  No results found for: "TSH"  No results found for: "K6TEJTO"   No results found for: "Neil Amy"      RECENT IMAGING:  Procedure: Mammo diagnostic bilateral w 3d & cad    Result Date: 10/12/2023  Narrative: DIAGNOSIS: Other abnormal and inconclusive findings on diagnostic imaging of breast TECHNIQUE: Digital diagnostic mammography was performed. Computer Aided Detection (CAD) analyzed all applicable images. COMPARISONS: Prior breast imaging dated: 08/18/2022, 08/17/2021, 12/22/2020, 12/22/2020, 10/06/2020, 09/24/2020, 09/24/2020, 09/09/2020, 09/09/2020, 08/25/2020, 10/11/2017, 10/12/2016, 10/12/2016, 10/04/2016, and 05/13/2015 RELEVANT HISTORY: Family Breast Cancer History: No known family history of breast cancer. Family Medical History: No known relevant family medical history. Personal History: Hormone history includes birth control and other. Surgical history includes breast biopsy and lumpectomy. Medical history includes breast cancer. RISK ASSESSMENT: Tyrer-zick risk assessment reporting was suppressed due to the patient's history and/or demographic factors. TISSUE DENSITY: The breasts are almost entirely fatty. INDICATION: Akira Frances is a 70 y.o. female presenting for annual mammogram.  Patient has a history of right lumpectomy in December 2020 for DCIS. FINDINGS: RIGHT 1) CALCIFICATIONS [B]: There are postsurgical changes in the upper outer posterior right breast from prior lumpectomy. Along the medial and posterior aspect of the lumpectomy site, there are 2 round calcifications. Magnification views of the lumpectomy bed were performed.   There are dystrophic calcifications in the lumpectomy bed as well as round benign appearing along the periphery of the lumpectomy bed. Round calcifications in the outer right breast are mammographically stable since 2020. On the lateral mag view, there are amorphous calcifications along the posterior aspect of the lumpectomy bed. An exaggerated lateral cc view was obtained; however, these calcifications could not definitively be visualized on this view. Findings have a similar morphology when compared to magnification views from 9/9/2020, which were biopsied and yielded DCIS. These calcifications may represent postsurgical changes; however, as they are new and have a similar morphology, stereotactic guided biopsy is recommended. Left Stable mammographic appearance of circumscribed masses in the left breast.  Many of these were evaluated on ultrasound dated 10/6/2020 and shown to be benign cysts. There are no new suspicious masses, grouped microcalcifications or areas of unexplained architectural distortion. Impression:  Indeterminate calcifications along the posterior aspect of the lumpectomy bed. Stereotactic guided biopsy is recommended for further evaluation. I personally discussed these findings and recommendations with the patient. Benign findings in the left breast. ASSESSMENT/BI-RADS CATEGORY: Left: 2 - Benign Overall: 2 - Benign RECOMMENDATION:      - Stereotactic breast biopsy for the right breast.      - Diagnostic mammogram in 1 year for the left breast. Workstation ID: [id number]         Portions of the record may have been created with voice recognition software. Occasional wrong word or "sound a like" substitutions may have occurred due to the inherent limitations of voice recognition software. Read the chart carefully and recognize, using context, where substitutions have occurred.

## 2023-11-15 ENCOUNTER — TELEPHONE (OUTPATIENT)
Age: 68
End: 2023-11-15

## 2023-11-16 ENCOUNTER — PATIENT MESSAGE (OUTPATIENT)
Age: 68
End: 2023-11-16

## 2023-11-20 ENCOUNTER — HOSPITAL ENCOUNTER (OUTPATIENT)
Dept: MAMMOGRAPHY | Facility: CLINIC | Age: 68
Discharge: HOME | End: 2023-11-20
Payer: COMMERCIAL

## 2023-11-20 VITALS — SYSTOLIC BLOOD PRESSURE: 144 MMHG | HEART RATE: 55 BPM | DIASTOLIC BLOOD PRESSURE: 80 MMHG

## 2023-11-20 DIAGNOSIS — R92.8 ABNORMAL MAMMOGRAM: ICD-10-CM

## 2023-11-20 PROCEDURE — A4648 IMPLANTABLE TISSUE MARKER: HCPCS

## 2023-11-20 PROCEDURE — 88305 TISSUE EXAM BY PATHOLOGIST: CPT | Performed by: SPECIALIST

## 2023-11-20 PROCEDURE — 19081 BX BREAST 1ST LESION STRTCTC: CPT

## 2023-11-20 RX ORDER — LIDOCAINE HYDROCHLORIDE 10 MG/ML
5 INJECTION, SOLUTION EPIDURAL; INFILTRATION; INTRACAUDAL; PERINEURAL ONCE
Status: COMPLETED | OUTPATIENT
Start: 2023-11-20 | End: 2023-11-20

## 2023-11-20 RX ORDER — LIDOCAINE HYDROCHLORIDE AND EPINEPHRINE BITARTRATE 20; .01 MG/ML; MG/ML
10 INJECTION, SOLUTION SUBCUTANEOUS ONCE
Status: COMPLETED | OUTPATIENT
Start: 2023-11-20 | End: 2023-11-20

## 2023-11-20 RX ADMIN — LIDOCAINE HYDROCHLORIDE AND EPINEPHRINE 10 ML: 20; 10 INJECTION, SOLUTION INFILTRATION; PERINEURAL at 14:20

## 2023-11-20 RX ADMIN — LIDOCAINE HYDROCHLORIDE 5 ML: 10 INJECTION, SOLUTION EPIDURAL; INFILTRATION; INTRACAUDAL; PERINEURAL at 14:20

## 2023-11-20 NOTE — PROGRESS NOTES
Ice pack given:    __X___yes _____no    Discharge instructions signed by patient:    _____yes ___X__no    Discharge instructions given to patient:    ___X__yes _____no    Discharged via:    __X___ambulatory    _____wheelchair    _____stretcher    Stable on discharge:    ___X__yes ____no

## 2023-11-20 NOTE — PROGRESS NOTES
Procedure type:    _____ultrasound guided ___X__stereotactic    Breast:    _____Left __X___Right    Location: 10 o'clock RB     Needle: 8 gauge     # of passes: 12 cores all submitted     Clip: Adalid     Performed by: Dr. Renette Kussmaul held for 5 minutes by: Carmen FLETCHER    Stermaria teresa Strips:    ___X__yes _____no    Band aid:    __X___yes_____no    Tape and guaze:    _____yes __X___no    Tolerated procedure:    ___X__yes _____no

## 2023-11-21 NOTE — PROGRESS NOTES
Post procedure call completed    Bleeding: _____yes __X___no    Pain: _____yes ___X___no    Redness/Swelling: ______yes ___X___no    Band aid removed: _____yes ___X__no (discussed removing when she showers)    Steri-Strips intact: ___X___yes _____no (discussed with patient to remove steri strips on 11/25/2023 if they have not come off on their own)    Pt with no questions at this time, adv will call when results available, adv to call with any questions or concerns, has name/# for contact

## 2023-11-22 PROCEDURE — 88305 TISSUE EXAM BY PATHOLOGIST: CPT | Performed by: SPECIALIST

## 2023-11-27 ENCOUNTER — TELEPHONE (OUTPATIENT)
Dept: MAMMOGRAPHY | Facility: CLINIC | Age: 68
End: 2023-11-27

## 2023-11-27 NOTE — TELEPHONE ENCOUNTER
Patient was given her negative breast biopsy results and advised to have diagnostic mammogram in 1 year for both breasts.

## 2023-12-04 DIAGNOSIS — K74.3 PRIMARY BILIARY CIRRHOSIS (HCC): ICD-10-CM

## 2023-12-04 RX ORDER — URSODIOL 500 MG/1
TABLET, FILM COATED ORAL
Qty: 360 TABLET | Refills: 1 | Status: SHIPPED | OUTPATIENT
Start: 2023-12-04

## 2024-01-01 NOTE — ASSESSMENT & PLAN NOTE
[facility]    [place] Discharge Summary    Date of Admission:  2024  5:05 AM  Date of Discharge:  2024    Primary Care Physician   Primary care provider: [facility]    Discharge Diagnoses   Active Problems:    [place] infant of 40 completed weeks of gestation     Hospital Course   Female-Phoebe Macias is a Term  appropriate for gestational age female   who was born at 2025 5:05 AM by  Vaginal, Spontaneous.    Hearing screen:  Hearing Screen Date: 24   Hearing Screen Date: 24  Hearing Screening Method: ABR  Hearing Screen, Left Ear: passed  Hearing Screen, Right Ear: passed     Oxygen Screen/CCHD:  Critical Congen Heart Defect Test Date: 24  Right Hand (%): 95 %  Foot (%): 96 %  Critical Congenital Heart Screen Result: pass       )  Patient Active Problem List   Diagnosis    [place] infant of 40 completed weeks of gestation       Feeding: Formula    Plan:  -Discharge to home with parents  -Follow-up with PCP in 2-3 days  -Anticipatory guidance given  Bilirubin level is >7 mg/dL below phototherapy threshold and age is <72 hours old. Discharge follow-up recommended within 3 days.  One older sibling with history of needing phototherapy.  The other two older siblings did not require treatment for jaundice.    -Maternal blood type is O+.  Baby is A+, MARTINE negative.      Ale Eason MD    Consultations This Hospital Stay   LACTATION IP CONSULT  NURSE PRACT  IP CONSULT    Discharge Orders   No discharge procedures on file.  Pending Results   These results will be followed up by [facility]   Unresulted Labs Ordered in the Past 30 Days of this Admission       Date and Time Order Name Status Description    2024 12:01 AM NB metabolic screen In process             Discharge Medications   There are no discharge medications for this patient.    Allergies   No Known  · First diagnosed 4/28  · Patient saturating 95% on room air during admission, provide supplemental oxygen if needed  · Continue with incentive spirometry  · Patient reports worsening shortness of breath over the past few days  · Admitted under mild pathway  · Continue vitamin-D, vitamin-C and zinc  · Continue remdesivir, patient has a history of malignancy  · Continue Pepcid  · Await procalcitonin before initiating antibiotic therapy given patient's stability  · CRP:  40 6   · Ferritin:  Pending  · D-dimer:  0 66  · Continue b i d   Lovenox  · ProBNP:  40  · CK:  53  · Troponin: <0 02 Allergies    Immunization History   Immunization History   Administered Date(s) Administered    Hepatitis B, Peds 2024        Significant Results and Procedures   none    Physical Exam   Vital Signs:  Patient Vitals for the past 24 hrs:   Temp Temp src Pulse Resp Weight   08/26/24 0916 -- -- -- -- 2.865 kg (6 lb 5.1 oz)   08/26/24 0848 98.4  F (36.9  C) Axillary 120 38 --   08/26/24 0415 98.7  F (37.1  C) Axillary 124 40 --   08/26/24 0020 98  F (36.7  C) Oral 128 40 --   08/25/24 1614 98.4  F (36.9  C) Axillary 136 36 --   08/25/24 1223 98.2  F (36.8  C) Axillary 124 40 --     Wt Readings from Last 3 Encounters:   08/26/24 2.865 kg (6 lb 5.1 oz) (18%, Z= -0.90)*     * Growth percentiles are based on WHO (Girls, 0-2 years) data.     Weight change since birth: -3%    General:  alert and normally responsive  Skin: scattered erythema toxicum on trunk and extremities.  Jaundice to face.    Head/Neck  normal anterior and posterior fontanelle, intact scalp; Neck without masses.  Eyes  normal red reflex  Ears/Nose/Mouth:  intact canals, patent nares, mouth normal  Thorax:  normal contour, clavicles intact  Lungs:  clear, no retractions, no increased work of breathing  Heart:  normal rate, rhythm.  No murmurs.  Normal femoral pulses.  Abdomen  soft without mass, tenderness, organomegaly, hernia.  Umbilicus normal.  Genitalia:  normal female external genitalia  Anus:  patent  Trunk/Spine  straight, intact  Musculoskeletal:  Normal Benavidez and Ortolani maneuvers.  intact without deformity.  Normal digits.  Neurologic:  normal, symmetric tone and strength.  normal reflexes.    Data   Results for orders placed or performed during the hospital encounter of 08/25/24 (from the past 24 hour(s))   Bilirubin Direct and Total   Result Value Ref Range    Bilirubin Direct 0.40 0.00 - 0.50 mg/dL    Bilirubin Total 5.9   mg/dL       bilitool

## 2024-02-20 DIAGNOSIS — K21.9 GASTROESOPHAGEAL REFLUX DISEASE WITHOUT ESOPHAGITIS: ICD-10-CM

## 2024-02-20 RX ORDER — PANTOPRAZOLE SODIUM 40 MG/1
40 TABLET, DELAYED RELEASE ORAL 2 TIMES DAILY
Qty: 180 TABLET | Refills: 1 | Status: SHIPPED | OUTPATIENT
Start: 2024-02-20

## 2024-03-21 ENCOUNTER — OFFICE VISIT (OUTPATIENT)
Dept: GASTROENTEROLOGY | Facility: CLINIC | Age: 69
End: 2024-03-21
Payer: COMMERCIAL

## 2024-03-21 ENCOUNTER — TELEPHONE (OUTPATIENT)
Age: 69
End: 2024-03-21

## 2024-03-21 VITALS
SYSTOLIC BLOOD PRESSURE: 130 MMHG | WEIGHT: 197 LBS | HEIGHT: 67 IN | BODY MASS INDEX: 30.92 KG/M2 | DIASTOLIC BLOOD PRESSURE: 80 MMHG

## 2024-03-21 DIAGNOSIS — R10.32 LLQ ABDOMINAL PAIN: Primary | ICD-10-CM

## 2024-03-21 DIAGNOSIS — K74.3 PRIMARY BILIARY CIRRHOSIS (HCC): ICD-10-CM

## 2024-03-21 DIAGNOSIS — R11.0 NAUSEA: ICD-10-CM

## 2024-03-21 DIAGNOSIS — R19.4 CHANGE IN BOWEL HABITS: ICD-10-CM

## 2024-03-21 PROCEDURE — 99213 OFFICE O/P EST LOW 20 MIN: CPT | Performed by: NURSE PRACTITIONER

## 2024-03-21 RX ORDER — AMOXICILLIN AND CLAVULANATE POTASSIUM 875; 125 MG/1; MG/1
1 TABLET, FILM COATED ORAL EVERY 12 HOURS SCHEDULED
Qty: 14 TABLET | Refills: 0 | Status: SHIPPED | OUTPATIENT
Start: 2024-03-21 | End: 2024-03-28

## 2024-03-21 NOTE — PROGRESS NOTES
[facility] - Outpatient Follow-up Note  Lin Lassiter 68 y.o. female MRN: [id number]  Encounter: [id number]    ASSESSMENT AND PLAN:      1. Change in bowel habits  2.  Left lower quadrant abdominal pain  3.  Intermittent nausea  3 to 4-week history of intermittent nausea with associated decreased appetite  Now with 2-week history of left lower quadrant abdominal pain radiating to low back which worsens with need to defecate  Developed more frequent bowel movements and urgency  Suspect acute diverticulitis.  Patient reports remote history, diverticular disease noted on colonoscopy 2019  -Will check CT scan abdomen and pelvis with p.o. and IV contrast  -Begin Augmentin twice daily x 7 days  -Check labs including CMP, CBC    4.  History of Morales's esophagus  5.  GERD  Symptoms currently well-controlled on Protonix 40 mg twice daily  EGD  6.  History of colon polyps  7.  Primary biliary cirrhosis      Followup Appointment: 2 months  ______________________________________________________________________    Chief Complaint   Patient presents with    Nausea     Bowel movements are different, painful and more frequent, pt vomited while having a bowel movement yesterday, pain is also on LLQ, when pt feels lower back pain she knows she will have a bowel movement and it comes on quick     HPI: Patient presents today with 1 month history of change in bowel habits.  Began with viral illness which her entire family was experiencing including nausea and vomiting.    The symptoms did improve however she continues to experience nausea daily with decreased appetite and decreased food intake.    She has also developed intermittent left lower quadrant pain over the past 2 to 3 weeks-occurs daily, waxes and wanes, radiates to the lower back and worsens with need to defecate  Experiencing more frequent bowel movements 2-3 times per day with associated urgency but denies diarrhea or loose bowel  movements.  No melena or rectal bleeding  Denies recent fever but has had intermittent chills and reports increasing fatigue and decreased energy level over the past 2 weeks        Historical Information   Past Medical History:   Diagnosis Date    Anxiety     Morales esophagus     Breast cancer (HCC) 09/24/2020    right mucinous Ca    Colon polyp     Gastroesophageal reflux disease without esophagitis 5/14/2019    GERD (gastroesophageal reflux disease)     History of gallstones     Hypertension     Personal history of colonic polyps 5/14/2019    Primary biliary cirrhosis (HCC) 5/14/2019    Seasonal allergies      Past Surgical History:   Procedure Laterality Date    APPENDECTOMY      BREAST BIOPSY Right 09/24/2020    stereo-  mucinous ca    BREAST BIOPSY Left 10/06/2020    us bx- neg    BREAST LUMPECTOMY Right 12/22/2020    Procedure: BREAST NEEDLE LOCALIZED LUMPECTOMY (NEEDLE LOC AT 1230);  Surgeon: Checo Knox MD;  [facility];  Service: Surgical Oncology    COLONOSCOPY  06/24/2020     2 adenomas.  Five year recall advised    INTRAOPERATIVE RADIATION THERAPY (IORT) Right 12/22/2020    Procedure: BREAST INTRAOPERATIVE RADIATION THERAPY (IORT) BY DR SOTO;  Surgeon: Checo Knox MD;  [facility];  Service: Surgical Oncology    LYMPH NODE BIOPSY Right 12/22/2020    Procedure: SENTINEL LYMPH NODE BIOPSY; LYMPHATIC MAPPING WITH BLUE DYE AND RADIOACTIVE DYE (INJECT AT 1400 BY DR KNOX IN THE OR);  Surgeon: Checo Knox MD;  [facility];  Service: Surgical Oncology    MAMMO NEEDLE LOCALIZATION RIGHT (ALL INC) Right 12/22/2020    MAMMO STEREOTACTIC BREAST BIOPSY RIGHT (ALL INC) Right 09/24/2020    MAMMO STEREOTACTIC BREAST BIOPSY RIGHT (ALL INC) Right 11/20/2023    ORIF WRIST FRACTURE Left 2/24/2023    Procedure: OPEN REDUCTION W/ INTERNAL FIXATION (ORIF) RADIUS / ULNA (WRIST), irrigation;  Surgeon: Campbell Eugene MD;  Location: [facility];  Service: Orthopedics    TUBAL LIGATION      UPPER  "GASTROINTESTINAL ENDOSCOPY  06/24/2019    irregular z-line.  biopsies negative for Morales's     Social History     Substance and Sexual Activity   Alcohol Use Yes    Alcohol/week: 8.0 standard drinks of alcohol    Types: 8 Glasses of wine per week     Social History     Substance and Sexual Activity   Drug Use Never     Social History     Tobacco Use   Smoking Status Never   Smokeless Tobacco Never     Family History   Problem Relation Age of Onset    No Known Problems Mother     Lung cancer Father 50    No Known Problems Sister     No Known Problems Sister     No Known Problems Brother     No Known Problems Maternal Grandmother     No Known Problems Maternal Grandfather     No Known Problems Paternal Grandmother     No Known Problems Paternal Grandfather     No Known Problems Maternal Aunt     No Known Problems Paternal Aunt     No Known Problems Paternal Aunt     No Known Problems Daughter     Colon cancer Neg Hx     Colon polyps Neg Hx          Current Outpatient Medications:     alendronate (FOSAMAX) 70 mg tablet    anastrozole (ARIMIDEX) 1 mg tablet    Azelastine HCl 137 MCG/SPRAY SOLN    Calcium Carbonate-Vitamin D (Calcium 500 + D) 500-125 MG-UNIT TABS    CRANBERRY EXTRACT PO    loratadine-pseudoephedrine (CLARITIN-D 24-HOUR)  mg per 24 hr tablet    Multiple Vitamins-Minerals (MULTIVITAMIN WOMEN 50+ PO)    pantoprazole (PROTONIX) 40 mg tablet    sertraline (ZOLOFT) 100 mg tablet    Turmeric Curcumin 500 MG CAPS    ursodiol (ACTIGALL) 500 MG tablet    zolpidem (AMBIEN) 10 mg tablet    albuterol (PROVENTIL HFA,VENTOLIN HFA) 90 mcg/act inhaler  Allergies   Allergen Reactions    Aspirin GI Intolerance     Reviewed medications and allergies and updated as indicated    PHYSICAL EXAM:    Blood pressure 130/80, height 5' 7\" (1.702 m), weight 89.4 kg (197 lb). Body mass index is 30.85 kg/m².  General Appearance: NAD, cooperative, alert  Eyes: Anicteric  ENT:  Normocephalic, atraumatic, normal mucosa.    Neck: "  Supple, symmetrical, trachea midline  Resp:  Clear to auscultation bilaterally; no rales, rhonchi or wheezing; respirations unlabored   CV:  S1 S2, Regular rate and rhythm; no murmur, rub, or gallop.  GI:  Soft, non-tender, non-distended; normal bowel sounds; no masses, no organomegaly   Rectal: Deferred  Musculoskeletal: No cyanosis, clubbing or edema. Normal ROM.  Skin:  No jaundice, rashes, or lesions   Psych: Normal affect, good eye contact  Neuro: No gross deficits, AAOx3    Lab Results:   Lab Results   Component Value Date    WBC 10.43 (H) 02/25/2023    HGB 11.5 02/25/2023    HCT 34.4 (L) 02/25/2023    MCV 93 02/25/2023     02/25/2023     Lab Results   Component Value Date    K 3.9 05/01/2023     05/01/2023    CO2 27 05/01/2023    BUN 14 05/01/2023    CREATININE 0.86 05/01/2023    GLUF 120 (H) 05/01/2023    CALCIUM 9.8 05/01/2023    CORRECTEDCA 9.9 05/07/2021    AST 15 07/27/2023    ALT 23 07/27/2023    ALKPHOS 124 (H) 07/27/2023    EGFR 69 05/01/2023     Lab Results   Component Value Date    FERRITIN 223 05/06/2021

## 2024-03-22 ENCOUNTER — LAB (OUTPATIENT)
Dept: LAB | Facility: HOSPITAL | Age: 69
End: 2024-03-22
Payer: COMMERCIAL

## 2024-03-22 DIAGNOSIS — K74.3 PRIMARY BILIARY CIRRHOSIS (HCC): ICD-10-CM

## 2024-03-22 LAB
ALBUMIN SERPL BCP-MCNC: 4.5 G/DL (ref 3.5–5)
ALP SERPL-CCNC: 101 U/L (ref 34–104)
ALT SERPL W P-5'-P-CCNC: 19 U/L (ref 7–52)
ANION GAP SERPL CALCULATED.3IONS-SCNC: 9 MMOL/L (ref 4–13)
AST SERPL W P-5'-P-CCNC: 18 U/L (ref 13–39)
BILIRUB DIRECT SERPL-MCNC: 0.11 MG/DL (ref 0–0.2)
BILIRUB SERPL-MCNC: 0.49 MG/DL (ref 0.2–1)
BUN SERPL-MCNC: 11 MG/DL (ref 5–25)
CALCIUM SERPL-MCNC: 9.5 MG/DL (ref 8.4–10.2)
CHLORIDE SERPL-SCNC: 97 MMOL/L (ref 96–108)
CO2 SERPL-SCNC: 28 MMOL/L (ref 21–32)
CREAT SERPL-MCNC: 0.65 MG/DL (ref 0.6–1.3)
ERYTHROCYTE [DISTWIDTH] IN BLOOD BY AUTOMATED COUNT: 12.5 % (ref 11.6–15.1)
GFR SERPL CREATININE-BSD FRML MDRD: 90 ML/MIN/1.73SQ M
GLUCOSE P FAST SERPL-MCNC: 90 MG/DL (ref 65–99)
HCT VFR BLD AUTO: 42.6 % (ref 34.8–46.1)
HGB BLD-MCNC: 14 G/DL (ref 11.5–15.4)
INR PPP: 1.02 (ref 0.84–1.19)
MCH RBC QN AUTO: 30.2 PG (ref 26.8–34.3)
MCHC RBC AUTO-ENTMCNC: 32.9 G/DL (ref 31.4–37.4)
MCV RBC AUTO: 92 FL (ref 82–98)
PLATELET # BLD AUTO: 385 THOUSANDS/UL (ref 149–390)
PMV BLD AUTO: 10.5 FL (ref 8.9–12.7)
POTASSIUM SERPL-SCNC: 4.1 MMOL/L (ref 3.5–5.3)
PROT SERPL-MCNC: 8.3 G/DL (ref 6.4–8.4)
PROTHROMBIN TIME: 13.3 SECONDS (ref 11.6–14.5)
RBC # BLD AUTO: 4.64 MILLION/UL (ref 3.81–5.12)
SODIUM SERPL-SCNC: 134 MMOL/L (ref 135–147)
TSH SERPL DL<=0.05 MIU/L-ACNC: 1.09 UIU/ML (ref 0.45–4.5)
WBC # BLD AUTO: 7.55 THOUSAND/UL (ref 4.31–10.16)

## 2024-03-22 PROCEDURE — 85027 COMPLETE CBC AUTOMATED: CPT | Performed by: NURSE PRACTITIONER

## 2024-03-22 PROCEDURE — 85610 PROTHROMBIN TIME: CPT | Performed by: NURSE PRACTITIONER

## 2024-03-22 PROCEDURE — 84443 ASSAY THYROID STIM HORMONE: CPT | Performed by: NURSE PRACTITIONER

## 2024-03-22 PROCEDURE — 36415 COLL VENOUS BLD VENIPUNCTURE: CPT | Performed by: NURSE PRACTITIONER

## 2024-03-22 PROCEDURE — 82248 BILIRUBIN DIRECT: CPT

## 2024-03-22 PROCEDURE — 80053 COMPREHEN METABOLIC PANEL: CPT | Performed by: NURSE PRACTITIONER

## 2024-03-28 ENCOUNTER — HOSPITAL ENCOUNTER (OUTPATIENT)
Dept: RADIOLOGY | Age: 69
Discharge: HOME | End: 2024-03-28
Payer: COMMERCIAL

## 2024-03-28 DIAGNOSIS — R10.32 LLQ ABDOMINAL PAIN: ICD-10-CM

## 2024-03-28 PROCEDURE — 74177 CT ABD & PELVIS W/CONTRAST: CPT

## 2024-03-28 RX ADMIN — IOHEXOL 100 ML: 350 INJECTION, SOLUTION INTRAVENOUS at 11:17

## 2024-03-29 ENCOUNTER — TELEPHONE (OUTPATIENT)
Dept: GASTROENTEROLOGY | Facility: CLINIC | Age: 69
End: 2024-03-29

## 2024-03-29 NOTE — TELEPHONE ENCOUNTER
Call patient and reviewed CT scan report which did show acute uncomplicated diverticulitis  She has been treated with Augmentin which was started after office visit 3/21/2024 and completed 7-day course yesterday 3/28  Left lower quadrant abdominal pain has resolved but now reports diarrhea with 3-4 urgent bowel movements daily x 3 days  Unclear if diarrhea is due to with antibiotic induced versus C. Difficile  Will continue to monitor, recommend daily yogurt, Pepto-Bismol as needed  Encourage patient to call the office if she continues to have diarrhea  She has follow-up scheduled in June with Dr. Siu -needs to be scheduled for surveillance colonoscopy after acute diverticulitis and history of colon polyps, last colonoscopy 2019

## 2024-04-13 DIAGNOSIS — C50.411 MALIGNANT NEOPLASM OF UPPER-OUTER QUADRANT OF RIGHT BREAST IN FEMALE, ESTROGEN RECEPTOR POSITIVE (HCC): ICD-10-CM

## 2024-04-13 DIAGNOSIS — Z17.0 MALIGNANT NEOPLASM OF UPPER-OUTER QUADRANT OF RIGHT BREAST IN FEMALE, ESTROGEN RECEPTOR POSITIVE (HCC): ICD-10-CM

## 2024-04-29 ENCOUNTER — OFFICE VISIT (OUTPATIENT)
Dept: ENDOCRINOLOGY | Facility: HOSPITAL | Age: 69
End: 2024-04-29
Payer: COMMERCIAL

## 2024-04-29 VITALS
DIASTOLIC BLOOD PRESSURE: 80 MMHG | HEIGHT: 67 IN | SYSTOLIC BLOOD PRESSURE: 122 MMHG | WEIGHT: 198.6 LBS | HEART RATE: 87 BPM | BODY MASS INDEX: 31.17 KG/M2

## 2024-04-29 DIAGNOSIS — M81.0 OSTEOPOROSIS, UNSPECIFIED OSTEOPOROSIS TYPE, UNSPECIFIED PATHOLOGICAL FRACTURE PRESENCE: Primary | ICD-10-CM

## 2024-04-29 PROCEDURE — 99213 OFFICE O/P EST LOW 20 MIN: CPT | Performed by: STUDENT IN AN ORGANIZED HEALTH CARE EDUCATION/TRAINING PROGRAM

## 2024-04-29 NOTE — PROGRESS NOTES
No chief complaint on file.     History of Present Illness:   Lin Lassiter is a 68 y.o. female with osteoporosis seen in consultation at the request of Keira Torrez after a recent left radius/Ulnar fracture after a mechanical fall from standing height and using hand for support. DXA scan after surgery showed T score of -2.7 at the forearm w -2.2 at AP spine, -1.6 at left hip and -1.7 at total hip. No prior DXA scan presents for 1 year follow up. Last visit started on fosamax.     HPI:   Onset:-   History of fragility fractures- Yes left radius/ulnar fracture- fell from standing height and used arm as support- . Chased her puppy with stick, fell down while chasing dog.   Other fractures- 35yr ago broke right arm- whacked arm on molding of doorway. Conservative managed  Left wrist- fell on ice 13 years ago   Right wrist- tripped on coffee table- 10 years ago.     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   Rheumatoid arthritis- No   Glucocorticoid use- No  Smoking history- No  Alcohol use- Drinks 2-3 bottles of wine a week  PPI use- Pantoprozole 40mg BID- been on this for 10 years  Estrogen blocker- Does take Anastrozole for Breast cancer- 2 more years left.   Antiseizure medication- None   Eating disorder- None  Dietary Calcium intake- Doesn't drink much milk, eats cheese, ice cream and leaf greens- daily.   Calcium/Vitamin D supplementation:- takes MV- 200mg and calcium supplement 600mg 1 tablet each, 1000IU VitD through MV.   Weight bearing exercises History of HRT use- Doing palates for OP  Falls- did trip today and fell on her side after getting her legs stuck in pants. Did not fracture anything. Denies any height loss    Medications used in past:   Fosamax     Last DXA Scan:- .     Patient Active Problem List   Diagnosis    Personal history of colonic polyps    Gastroesophageal reflux disease without  esophagitis    Primary biliary cirrhosis (HCC)    Morales's esophagus without dysplasia    Malignant neoplasm of upper-outer quadrant of right breast in female, estrogen receptor positive (HCC)    Use of anastrozole (Arimidex)    COVID-19    Anxiety    Depression    Fracture of distal radius and ulna, left, open type I or II, initial encounter    Hypokalemia      Past Medical History:   Diagnosis Date    Anxiety     Morales esophagus     Breast cancer (HCC) 09/24/2020    right mucinous Ca    Colon polyp     Gastroesophageal reflux disease without esophagitis 5/14/2019    GERD (gastroesophageal reflux disease)     History of gallstones     Hypertension     Personal history of colonic polyps 5/14/2019    Primary biliary cirrhosis (HCC) 5/14/2019    Seasonal allergies       Past Surgical History:   Procedure Laterality Date    APPENDECTOMY      BREAST BIOPSY Right 09/24/2020    stereo-  mucinous ca    BREAST BIOPSY Left 10/06/2020    us bx- neg    BREAST LUMPECTOMY Right 12/22/2020    Procedure: BREAST NEEDLE LOCALIZED LUMPECTOMY (NEEDLE LOC AT 1230);  Surgeon: Checo Lee MD;  [facility];  Service: Surgical Oncology    COLONOSCOPY  06/24/2020     2 adenomas.  Five year recall advised    INTRAOPERATIVE RADIATION THERAPY (IORT) Right 12/22/2020    Procedure: BREAST INTRAOPERATIVE RADIATION THERAPY (IORT) BY DR SOTO;  Surgeon: Checo Lee MD;  [facility];  Service: Surgical Oncology    LYMPH NODE BIOPSY Right 12/22/2020    Procedure: SENTINEL LYMPH NODE BIOPSY; LYMPHATIC MAPPING WITH BLUE DYE AND RADIOACTIVE DYE (INJECT AT 1400 BY DR LEE IN THE OR);  Surgeon: Chceo Lee MD;  [facility];  Service: Surgical Oncology    MAMMO NEEDLE LOCALIZATION RIGHT (ALL INC) Right 12/22/2020    MAMMO STEREOTACTIC BREAST BIOPSY RIGHT (ALL INC) Right 09/24/2020    MAMMO STEREOTACTIC BREAST BIOPSY RIGHT (ALL INC) Right 11/20/2023    ORIF WRIST FRACTURE Left 2/24/2023    Procedure: OPEN REDUCTION W/ INTERNAL  FIXATION (ORIF) RADIUS / ULNA (WRIST), irrigation;  Surgeon: Campbell Eugene MD;  Location:  MAIN OR;  Service: Orthopedics    TUBAL LIGATION      UPPER GASTROINTESTINAL ENDOSCOPY  06/24/2019    irregular z-line.  biopsies negative for Morales's      Family History   Problem Relation Age of Onset    No Known Problems Mother     Lung cancer Father 50    No Known Problems Sister     No Known Problems Sister     No Known Problems Brother     No Known Problems Maternal Grandmother     No Known Problems Maternal Grandfather     No Known Problems Paternal Grandmother     No Known Problems Paternal Grandfather     No Known Problems Maternal Aunt     No Known Problems Paternal Aunt     No Known Problems Paternal Aunt     No Known Problems Daughter     Colon cancer Neg Hx     Colon polyps Neg Hx      Social History     Tobacco Use    Smoking status: Never    Smokeless tobacco: Never   Substance Use Topics    Alcohol use: Yes     Alcohol/week: 8.0 standard drinks of alcohol     Types: 8 Glasses of wine per week     Allergies   Allergen Reactions    Aspirin GI Intolerance         Current Outpatient Medications:     albuterol (PROVENTIL HFA,VENTOLIN HFA) 90 mcg/act inhaler, Inhale 1 puff every 4 (four) hours as needed for wheezing (Patient not taking: Reported on 4/27/2023), Disp: , Rfl:     alendronate (FOSAMAX) 70 mg tablet, Take 1 tablet (70 mg total) by mouth every 7 days, Disp: 52 tablet, Rfl: 1    anastrozole (ARIMIDEX) 1 mg tablet, Take 1 tablet (1 mg total) by mouth daily, Disp: 90 tablet, Rfl: 3    Azelastine HCl 137 MCG/SPRAY SOLN, into each nostril daily as needed, Disp: , Rfl:     Calcium Carbonate-Vitamin D (Calcium 500 + D) 500-125 MG-UNIT TABS, Take by mouth daily, Disp: , Rfl:     CRANBERRY EXTRACT PO, Take by mouth, Disp: , Rfl:     loratadine-pseudoephedrine (CLARITIN-D 24-HOUR)  mg per 24 hr tablet, Take 1 tablet by mouth as needed for allergies, Disp: , Rfl:     Multiple Vitamins-Minerals  (MULTIVITAMIN WOMEN 50+ PO), Take by mouth daily , Disp: , Rfl:     pantoprazole (PROTONIX) 40 mg tablet, take 1 tablet by mouth twice a day, Disp: 180 tablet, Rfl: 1    sertraline (ZOLOFT) 100 mg tablet, Take 100 mg by mouth daily, Disp: , Rfl:     Turmeric Curcumin 500 MG CAPS, Take by mouth daily, Disp: , Rfl:     ursodiol (ACTIGALL) 500 MG tablet, take 2 tablets by mouth twice a day, Disp: 360 tablet, Rfl: 1    zolpidem (AMBIEN) 10 mg tablet, Take 10 mg by mouth daily at bedtime, Disp: , Rfl:   Review of Systems   Constitutional:  Negative for chills and fever.   HENT:  Negative for ear pain and sore throat.    Eyes:  Negative for pain and visual disturbance.   Respiratory:  Negative for cough and shortness of breath.    Cardiovascular:  Negative for chest pain and palpitations.   Gastrointestinal:  Negative for abdominal pain and vomiting.   Genitourinary:  Negative for dysuria and hematuria.   Musculoskeletal:  Negative for arthralgias and back pain.   Skin:  Negative for color change and rash.   Neurological:  Negative for seizures and syncope.   All other systems reviewed and are negative.      Physical Exam:  There is no height or weight on file to calculate BMI.  There were no vitals taken for this visit.   Wt Readings from Last 3 Encounters:   03/21/24 89.4 kg (197 lb)   11/09/23 86.6 kg (191 lb)   10/12/23 83.9 kg (185 lb)       GEN: NAD  Eyes: no stare or proptosis, nl lids and conjunctiva, EOMI  Neck: trachea midline, thyroid NT to palpation, nl in size, no nodules or neck masses noted, no cervical LAD  CV; heart reg rate s1s2 nl, no m/r/g appreciated, no RUBINA  Resp: CTAB, good effort  Ab+BS  Neuro: no tremor, 2+ DTRs in BUE  MS: no c/c in digits, moves all 4 ext, nl muscle bulk, gait nl  Skin: warm and dry, no palmar erythema  Has left wrist splint  Psych: nl mood and affect, no gross lapses in memory    DATA:  Labs:    Latest Reference Range & Units 05/01/23 11:13 03/22/24 13:22   PTH 18.4 - 80.1  pg/mL 33.0    TSH 3RD GENERATON 0.450 - 4.500 uIU/mL  1.090      [facility] Reference Range & Units 03/22/24 13:22   Chloride 96 - 108 mmol/L 97   Carbon Dioxide 21 - 32 mmol/L 28   ANION GAP 4 - 13 mmol/L 9   BUN 5 - 25 mg/dL 11   Creatinine 0.60 - 1.30 mg/dL 0.65   GLUCOSE, FASTING 65 - 99 mg/dL 90   Calcium 8.4 - 10.2 mg/dL 9.5   AST 13 - 39 U/L 18   ALT 7 - 52 U/L 19   ALK PHOS 34 - 104 U/L 101   Total Protein 6.4 - 8.4 g/dL 8.3   Albumin 3.5 - 5.0 g/dL 4.5   Total Bilirubin 0.20 - 1.00 mg/dL 0.49   GFR, Calculated ml/min/1.73sq m 90     Radiology    04/12/2023   DXA SCAN     CLINICAL HISTORY: 67 years postmenopausal female .   OTHER RISK FACTORS:  Prior fracture as a result of minor injury, PPI therapy, SSRI therapy, Arimidex therapy.     PHARMACOLOGIC THERAPY FOR OSTEOPOROSIS:  None.     TECHNIQUE: Bone densitometry was performed using a HoloVideum Horizon A  bone densitometer.  Regions of interest appear properly placed.         COMPARISON: There are no prior DXA studies performed on this unit for comparison.     RESULTS:      LUMBAR SPINE  Level: L1, L3  (L2, L4 vertebrae excluded from analysis due to local structural abnormalities or artifact) :   BMD:  0.766  gm/cm2   T-score: -2.2         LEFT  TOTAL HIP:   BMD:  0.752  gm/cm2   T-score:  -1.6     LEFT  FEMORAL NECK:   BMD:  0.664  gm/cm2   T score: -1.7      RIGHT  FOREARM:    33% RADIUS BMD:  0.526  gm/cm2  T-score:  -2.7         IMPRESSION:     1.  Osteoporosis. [Based on the right radius]     2.  The 10 year risk of hip fracture is 2.1% with the 10 year risk of major osteoporotic fracture being 15% as calculated by the [employer] fracture risk assessment tool (FRAX, which is based on data generated by the WHO Collaborating [place]   for Metabolic Bone Diseases).  3.  The current NOF guidelines recommend treating patients with a T-score of -2.5 or less in the lumbar spine or hips, or in post-menopausal women and men over the age of 50 with low  bone mass (osteopenia) and a FRAX 10 year risk score of >3% for hip   fracture and/or >20% for major osteoporotic fracture.     4.  The NOF recommends follow-up DXA in 1-2 years after initiating therapy for osteoporosis and every 2 years thereafter. More frequent evaluation is appropriate for patients with conditions associated with rapid bone loss, such as glucocorticoid   therapy. The interval between DXA screenings may be longer for individuals without major risk factors and initial T-score in the normal or upper low bone mass range.    Impression:  No diagnosis found.     Plan:    There are no diagnoses linked to this encounter.    1. Osteoporosis, unspecified osteoporosis type, unspecified pathological fracture presence  - Ambulatory Referral to Endocrinology    PLAN   Osteoporosis- age related with Hx of fracture of left wrist after a fall from standing height. Patient also has other Pmhx of several other fractures few years ago, Most recent fracture 02/23 of left wrist. Risk factor for osteoporosis includes PPI use, Female, Age, Post menopausal and Estrogen blocker use. Reviewed her DXA scan and since lowest t score of -2.7 at forearm with osteopenia at rest sites -2.2 at spine. Started on with oral bisphosphonate Fosamax 70mg once a week 04/23, tolerating it ok. No side effects, no recent fractures, falls or height loss. C/w fosamax for now. Take VitD 1000IU and calcium 1200mg daily. Take fall precations and do weight baring exercises. Repeat DXA now if insurance covers it or ok to wait for another year if they dont approve this.     Discussed use of PPI can affect calcium/vitD absorption and increase risk of further bone demineralization, discuss with PCP about alternative.    RTC in 1 year     Discussed with the patient and all questioned fully answered. She will call me if any problems arise.        Karis Clark MD

## 2024-05-13 DIAGNOSIS — C50.411 MALIGNANT NEOPLASM OF UPPER-OUTER QUADRANT OF RIGHT BREAST IN FEMALE, ESTROGEN RECEPTOR POSITIVE (HCC): ICD-10-CM

## 2024-05-13 DIAGNOSIS — Z17.0 MALIGNANT NEOPLASM OF UPPER-OUTER QUADRANT OF RIGHT BREAST IN FEMALE, ESTROGEN RECEPTOR POSITIVE (HCC): ICD-10-CM

## 2024-05-13 RX ORDER — ANASTROZOLE 1 MG/1
1 TABLET ORAL DAILY
Refills: 3 | OUTPATIENT
Start: 2024-05-13

## 2024-05-13 RX ORDER — ANASTROZOLE 1 MG/1
1 TABLET ORAL DAILY
Qty: 90 TABLET | Refills: 3 | Status: SHIPPED | OUTPATIENT
Start: 2024-05-13

## 2024-05-13 NOTE — TELEPHONE ENCOUNTER
Medication Refill Request   Who are you speaking with? Patient   If it is not the patient, are they listed on an active communication consent form?     N/A   Which medication is being requested for refill?  Please list medication name and dosage    Arimidex 1 mg   How many pills does the patient have left?  0   Preferred Pharmacy / Address  [id number] - [place] - [address] [facility]  [address] [place] [id number]  Phone: [phone]  Fax: [phone]    Who is the prescribing provider? Dr. Oswald   Call back number  [phone]    Relevant Information  na

## 2024-06-01 DIAGNOSIS — K74.3 PRIMARY BILIARY CIRRHOSIS (HCC): ICD-10-CM

## 2024-06-01 RX ORDER — URSODIOL 500 MG/1
TABLET, FILM COATED ORAL
Qty: 360 TABLET | Refills: 1 | Status: SHIPPED | OUTPATIENT
Start: 2024-06-01

## 2024-06-03 ENCOUNTER — TELEPHONE (OUTPATIENT)
Age: 69
End: 2024-06-03

## 2024-06-03 NOTE — TELEPHONE ENCOUNTER
Patients GI provider:  Dr. Siu    Number to return call: [phone]    Reason for call: Pt calling asking if new blood work is needed before f/u appt.    Scheduled procedure/appointment date if applicable: Appt. 6/4/24

## 2024-06-04 ENCOUNTER — OFFICE VISIT (OUTPATIENT)
Age: 69
End: 2024-06-04
Payer: COMMERCIAL

## 2024-06-04 VITALS
BODY MASS INDEX: 31.39 KG/M2 | SYSTOLIC BLOOD PRESSURE: 150 MMHG | WEIGHT: 200 LBS | DIASTOLIC BLOOD PRESSURE: 92 MMHG | HEIGHT: 67 IN

## 2024-06-04 DIAGNOSIS — K74.3 PRIMARY BILIARY CIRRHOSIS (HCC): ICD-10-CM

## 2024-06-04 DIAGNOSIS — K22.70 BARRETT'S ESOPHAGUS WITHOUT DYSPLASIA: ICD-10-CM

## 2024-06-04 DIAGNOSIS — K21.9 GASTROESOPHAGEAL REFLUX DISEASE WITHOUT ESOPHAGITIS: Primary | ICD-10-CM

## 2024-06-04 DIAGNOSIS — Z86.010 PERSONAL HISTORY OF COLONIC POLYPS: ICD-10-CM

## 2024-06-04 PROCEDURE — 99214 OFFICE O/P EST MOD 30 MIN: CPT | Performed by: INTERNAL MEDICINE

## 2024-06-04 RX ORDER — FAMOTIDINE 40 MG/1
40 TABLET, FILM COATED ORAL 2 TIMES DAILY
Qty: 60 TABLET | Refills: 12 | Status: SHIPPED | OUTPATIENT
Start: 2024-06-04

## 2024-06-04 NOTE — PROGRESS NOTES
[facility] - Outpatient Follow-up Note  Lin Lassiter 68 y.o. female MRN: [id number]  Encounter: [id number]    ASSESSMENT AND PLAN:      1. Gastroesophageal reflux disease without esophagitis  2. Morales's esophagus without dysplasia  Stable on pantoprazole 40 mg twice a day.  Has had breakthrough since we try to lower the dose.  Last EGD 2022 with short segment Morales's but no dysplasia.  Endocrinology now concerned about her osteoporosis and the PPI  -Will switch to Pepcid 40 mg twice a day from pantoprazole but am concerned that this is not strong enough to control her symptoms.  With her history of Morales's and only a questionable association between osteoporosis and the PPIs, if her symptoms are not adequately controlled with the H2 blocker will restart pantoprazole 40 mg twice a day  - famotidine (PEPCID) 40 MG tablet; Take 1 tablet (40 mg total) by mouth 2 (two) times a day  Dispense: 60 tablet; Refill: 12  -Due for follow-up EGD September 2025      3. Personal history of colonic polyps  Last colonoscopy 2019.  Due for follow-up colonoscopy secondary to history of polyps and the recent bout of diverticulitis  - Colonoscopy; Future    4. Primary biliary cirrhosis (HCC)  Stable on Actigall.  Blood work normal in the spring  -Continue Actigall      Follow up appointment: 6 months    _______________________      Chief Complaint   Patient presents with    Follow-up     Pt has had heartburn recently but could be something she ate or drank, pt has been diagnosed with osteoperosis       HPI:   Patient is a 68 y.o. female with a significant PMH of GERD/Morales's presenting for follow up regarding osteoporosis and follow-up diverticulitis.  Patient was last seen by in the spring when she presented with diverticulitis.  CAT scan confirmed mild diverticulitis.  She was treated with antibiotics and did well.  From a GI standpoint she has been doing well.  She is moving her bowels  regularly.  She continues take pantoprazole 40 mg twice a day which controlling her GERD symptoms.  She has had issues we will try to lower the dose.  She denies any dysphagia, odynophagia, or early satiety.  Her last EGD was June 2022.  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    Cancer (HCC) 9/20    Breast    Colon polyp     Gastroesophageal reflux disease without esophagitis 05/14/2019    GERD (gastroesophageal reflux disease)     History of gallstones     Hypertension     Personal history of colonic polyps 05/14/2019    Primary biliary cirrhosis (HCC) 05/14/2019    Seasonal allergies      Past Surgical History:   Procedure Laterality Date    APPENDECTOMY      BREAST BIOPSY Right 09/24/2020    stereo-  mucinous ca    BREAST BIOPSY Left 10/06/2020    us bx- neg    BREAST LUMPECTOMY Right 12/22/2020    Procedure: BREAST NEEDLE LOCALIZED LUMPECTOMY (NEEDLE LOC AT 1230);  Surgeon: Checo Lee MD;  [facility];  Service: Surgical Oncology    COLONOSCOPY  06/24/2020     2 adenomas.  Five year recall advised    INTRAOPERATIVE RADIATION THERAPY (IORT) Right 12/22/2020    Procedure: BREAST INTRAOPERATIVE RADIATION THERAPY (IORT) BY DR SOTO;  Surgeon: Checo Lee MD;  [facility];  Service: Surgical Oncology    LYMPH NODE BIOPSY Right 12/22/2020    Procedure: SENTINEL LYMPH NODE BIOPSY; LYMPHATIC MAPPING WITH BLUE DYE AND RADIOACTIVE DYE (INJECT AT 1400 BY DR LEE IN THE OR);  Surgeon: Checo Lee MD;  [facility];  Service: Surgical Oncology    MAMMO NEEDLE LOCALIZATION RIGHT (ALL INC) Right 12/22/2020    MAMMO STEREOTACTIC BREAST BIOPSY RIGHT (ALL INC) Right 09/24/2020    MAMMO STEREOTACTIC BREAST BIOPSY RIGHT (ALL INC) Right 11/20/2023    ORIF WRIST FRACTURE Left 2/24/2023     Procedure: OPEN REDUCTION W/ INTERNAL FIXATION (ORIF) RADIUS / ULNA (WRIST), irrigation;  Surgeon: Campbell Eugene MD;  Location:  MAIN OR;  Service: Orthopedics    TUBAL LIGATION      UPPER GASTROINTESTINAL ENDOSCOPY  06/24/2019    irregular z-line.  biopsies negative for Morales's     Social History     Substance and Sexual Activity   Alcohol Use Yes    Alcohol/week: 6.0 standard drinks of alcohol    Types: 6 Glasses of wine per week     Social History     Substance and Sexual Activity   Drug Use Never     Social History     Tobacco Use   Smoking Status Never   Smokeless Tobacco Never     Family History   Problem Relation Age of Onset    No Known Problems Mother     Lung cancer Father 50    No Known Problems Sister     No Known Problems Sister     No Known Problems Brother     No Known Problems Maternal Grandmother     No Known Problems Maternal Grandfather     No Known Problems Paternal Grandmother     No Known Problems Paternal Grandfather     No Known Problems Maternal Aunt     No Known Problems Paternal Aunt     No Known Problems Paternal Aunt     No Known Problems Daughter     Colon cancer Neg Hx     Colon polyps Neg Hx          Current Outpatient Medications:     alendronate (FOSAMAX) 70 mg tablet    anastrozole (ARIMIDEX) 1 mg tablet    Azelastine HCl 137 MCG/SPRAY SOLN    Calcium Carbonate-Vitamin D (Calcium 500 + D) 500-125 MG-UNIT TABS    CRANBERRY EXTRACT PO    famotidine (PEPCID) 40 MG tablet    loratadine-pseudoephedrine (CLARITIN-D 24-HOUR)  mg per 24 hr tablet    Multiple Vitamins-Minerals (MULTIVITAMIN WOMEN 50+ PO)    pantoprazole (PROTONIX) 40 mg tablet    sertraline (ZOLOFT) 100 mg tablet    Turmeric Curcumin 500 MG CAPS    ursodiol (ACTIGALL) 500 MG tablet    zolpidem (AMBIEN) 10 mg tablet    albuterol (PROVENTIL HFA,VENTOLIN HFA) 90 mcg/act inhaler  Allergies   Allergen Reactions    Aspirin GI Intolerance     Reviewed medications and allergies and updated as indicated    PHYSICAL EXAM:   "  Blood pressure 150/92, height 5' 7\" (1.702 m), weight 90.7 kg (200 lb). Body mass index is 31.32 kg/m².  General Appearance: NAD, cooperative, alert  Eyes: Anicteric  Chest: Clear to auscultation  Heart: Regular rate and rhythm  GI:  Soft, non-tender, non-distended; normal bowel sounds; no masses, no organomegaly   Rectal: Deferred  Musculoskeletal: No edema.  Skin:  No jaundice    Lab Results:   Lab Results   Component Value Date    WBC 7.55 03/22/2024    WBC 10.43 (H) 02/25/2023    WBC 9.05 02/24/2023    HGB 14.0 03/22/2024    HGB 11.5 02/25/2023    HGB 12.4 02/24/2023    MCV 92 03/22/2024     03/22/2024     02/25/2023     02/24/2023     Lab Results   Component Value Date    K 4.1 03/22/2024    CL 97 03/22/2024    CO2 28 03/22/2024    BUN 11 03/22/2024    CREATININE 0.65 03/22/2024    GLUF 90 03/22/2024    CALCIUM 9.5 03/22/2024    CORRECTEDCA 9.9 05/07/2021    AST 18 03/22/2024    AST 15 07/27/2023    AST 18 05/01/2023    ALT 19 03/22/2024    ALT 23 07/27/2023    ALT 25 05/01/2023    ALKPHOS 101 03/22/2024    ALKPHOS 124 (H) 07/27/2023    ALKPHOS 130 (H) 05/01/2023    EGFR 90 03/22/2024       Radiology Results:   No results found.  "

## 2024-06-04 NOTE — PATIENT INSTRUCTIONS
Start Pepcid 40 mg twice a day.  Stop pantoprazole for now.  Let me know if you have breakthrough symptoms from a reflux standpoint.  Colonoscopy.  Office visit 6 months

## 2024-06-04 NOTE — LETTER
June 4, 2024     Laura Flores, DO  [address] [facility] [address]    Patient: Lin Lassiter   YOB: 1955   Date of Visit: 6/4/2024       Dear Dr. Flores:    Thank you for referring Lin Lassiter to me for evaluation. Below are my notes for this consultation.    If you have questions, please do not hesitate to call me. I look forward to following your patient along with you.         Sincerely,        Darien Siu MD        CC: MD Darien Davis MD  6/4/2024  2:51 PM  Sign when Signing Visit  [facility] - Outpatient Follow-up Note  Lin Lassiter 68 y.o. female MRN: [id number]  Encounter: [id number]    ASSESSMENT AND PLAN:      1. Gastroesophageal reflux disease without esophagitis  2. Morales's esophagus without dysplasia  Stable on pantoprazole 40 mg twice a day.  Has had breakthrough since we try to lower the dose.  Last EGD 2022 with short segment Morales's but no dysplasia.  Endocrinology now concerned about her osteoporosis and the PPI  -Will switch to Pepcid 40 mg twice a day from pantoprazole but am concerned that this is not strong enough to control her symptoms.  With her history of Morales's and only a questionable association between osteoporosis and the PPIs, if her symptoms are not adequately controlled with the H2 blocker will restart pantoprazole 40 mg twice a day  - famotidine (PEPCID) 40 MG tablet; Take 1 tablet (40 mg total) by mouth 2 (two) times a day  Dispense: 60 tablet; Refill: 12  -Due for follow-up EGD September 2025      3. Personal history of colonic polyps  Last colonoscopy 2019.  Due for follow-up colonoscopy secondary to history of polyps and the recent bout of diverticulitis  - Colonoscopy; Future    4. Primary biliary cirrhosis (HCC)  Stable on Actigall.  Blood work normal in the spring  -Continue Actigall      Follow up appointment: 6 months    _______________________      Chief Complaint    Patient presents with   • Follow-up     Pt has had heartburn recently but could be something she ate or drank, pt has been diagnosed with osteoperosis       HPI:   Patient is a 68 y.o. female with a significant PMH of GERD/Morales's presenting for follow up regarding osteoporosis and follow-up diverticulitis.  Patient was last seen by in the spring when she presented with diverticulitis.  CAT scan confirmed mild diverticulitis.  She was treated with antibiotics and did well.  From a GI standpoint she has been doing well.  She is moving her bowels regularly.  She continues take pantoprazole 40 mg twice a day which controlling her GERD symptoms.  She has had issues we will try to lower the dose.  She denies any dysphagia, odynophagia, or early satiety.  Her last EGD was June 2022.  Recently she been diagnosed with osteoporosis and her endocrinologist was concerned about the PPI    Historical Information  Past Medical History:   Diagnosis Date   • Anxiety    • Morales esophagus    • Breast cancer (HCC) 09/24/2020    right mucinous Ca   • Cancer (HCC) 9/20    Breast   • Colon polyp    • Gastroesophageal reflux disease without esophagitis 05/14/2019   • GERD (gastroesophageal reflux disease)    • History of gallstones    • Hypertension    • Personal history of colonic polyps 05/14/2019   • Primary biliary cirrhosis (HCC) 05/14/2019   • Seasonal allergies      Past Surgical History:   Procedure Laterality Date   • APPENDECTOMY     • BREAST BIOPSY Right 09/24/2020    stereo-  mucinous ca   • BREAST BIOPSY Left 10/06/2020    us bx- neg   • BREAST LUMPECTOMY Right 12/22/2020    Procedure: BREAST NEEDLE LOCALIZED LUMPECTOMY (NEEDLE LOC AT 1230);  Surgeon: Checo Lee MD;  [facility];  Service: Surgical Oncology   • COLONOSCOPY  06/24/2020     2 adenomas.  Five year recall advised   • INTRAOPERATIVE RADIATION THERAPY (IORT) Right 12/22/2020    Procedure: BREAST INTRAOPERATIVE RADIATION THERAPY (IORT) BY DR SOTO;   Surgeon: Checo Lee MD;  [facility];  Service: Surgical Oncology   • LYMPH NODE BIOPSY Right 12/22/2020    Procedure: SENTINEL LYMPH NODE BIOPSY; LYMPHATIC MAPPING WITH BLUE DYE AND RADIOACTIVE DYE (INJECT AT 1400 BY DR LEE IN THE OR);  Surgeon: Checo Lee MD;  [facility];  Service: Surgical Oncology   • MAMMO NEEDLE LOCALIZATION RIGHT (ALL INC) Right 12/22/2020   • MAMMO STEREOTACTIC BREAST BIOPSY RIGHT (ALL INC) Right 09/24/2020   • MAMMO STEREOTACTIC BREAST BIOPSY RIGHT (ALL INC) Right 11/20/2023   • ORIF WRIST FRACTURE Left 2/24/2023    Procedure: OPEN REDUCTION W/ INTERNAL FIXATION (ORIF) RADIUS / ULNA (WRIST), irrigation;  Surgeon: Campbell Eugene MD;  Location: [facility];  Service: Orthopedics   • TUBAL LIGATION     • UPPER GASTROINTESTINAL ENDOSCOPY  06/24/2019    irregular z-line.  biopsies negative for Morales's     Social History     Substance and Sexual Activity   Alcohol Use Yes   • Alcohol/week: 6.0 standard drinks of alcohol   • Types: 6 Glasses of wine per week     Social History     Substance and Sexual Activity   Drug Use Never     Social History     Tobacco Use   Smoking Status Never   Smokeless Tobacco Never     Family History   Problem Relation Age of Onset   • No Known Problems Mother    • Lung cancer Father 50   • No Known Problems Sister    • No Known Problems Sister    • No Known Problems Brother    • No Known Problems Maternal Grandmother    • No Known Problems Maternal Grandfather    • No Known Problems Paternal Grandmother    • No Known Problems Paternal Grandfather    • No Known Problems Maternal Aunt    • No Known Problems Paternal Aunt    • No Known Problems Paternal Aunt    • No Known Problems Daughter    • Colon cancer Neg Hx    • Colon polyps Neg Hx          Current Outpatient Medications:   •  alendronate (FOSAMAX) 70 mg tablet  •  anastrozole (ARIMIDEX) 1 mg tablet  •  Azelastine HCl 137 MCG/SPRAY SOLN  •  Calcium Carbonate-Vitamin D (Calcium 500 + D)  "500-125 MG-UNIT TABS  •  CRANBERRY EXTRACT PO  •  famotidine (PEPCID) 40 MG tablet  •  loratadine-pseudoephedrine (CLARITIN-D 24-HOUR)  mg per 24 hr tablet  •  Multiple Vitamins-Minerals (MULTIVITAMIN WOMEN 50+ PO)  •  pantoprazole (PROTONIX) 40 mg tablet  •  sertraline (ZOLOFT) 100 mg tablet  •  Turmeric Curcumin 500 MG CAPS  •  ursodiol (ACTIGALL) 500 MG tablet  •  zolpidem (AMBIEN) 10 mg tablet  •  albuterol (PROVENTIL HFA,VENTOLIN HFA) 90 mcg/act inhaler  Allergies   Allergen Reactions   • Aspirin GI Intolerance     Reviewed medications and allergies and updated as indicated    PHYSICAL EXAM:    Blood pressure 150/92, height 5' 7\" (1.702 m), weight 90.7 kg (200 lb). Body mass index is 31.32 kg/m².  General Appearance: NAD, cooperative, alert  Eyes: Anicteric  Chest: Clear to auscultation  Heart: Regular rate and rhythm  GI:  Soft, non-tender, non-distended; normal bowel sounds; no masses, no organomegaly   Rectal: Deferred  Musculoskeletal: No edema.  Skin:  No jaundice    Lab Results:   Lab Results   Component Value Date    WBC 7.55 03/22/2024    WBC 10.43 (H) 02/25/2023    WBC 9.05 02/24/2023    HGB 14.0 03/22/2024    HGB 11.5 02/25/2023    HGB 12.4 02/24/2023    MCV 92 03/22/2024     03/22/2024     02/25/2023     02/24/2023     Lab Results   Component Value Date    K 4.1 03/22/2024    CL 97 03/22/2024    CO2 28 03/22/2024    BUN 11 03/22/2024    CREATININE 0.65 03/22/2024    GLUF 90 03/22/2024    CALCIUM 9.5 03/22/2024    CORRECTEDCA 9.9 05/07/2021    AST 18 03/22/2024    AST 15 07/27/2023    AST 18 05/01/2023    ALT 19 03/22/2024    ALT 23 07/27/2023    ALT 25 05/01/2023    ALKPHOS 101 03/22/2024    ALKPHOS 124 (H) 07/27/2023    ALKPHOS 130 (H) 05/01/2023    EGFR 90 03/22/2024       Radiology Results:   No results found.  "

## 2024-06-29 DIAGNOSIS — M81.0 OSTEOPOROSIS, UNSPECIFIED OSTEOPOROSIS TYPE, UNSPECIFIED PATHOLOGICAL FRACTURE PRESENCE: ICD-10-CM

## 2024-06-29 RX ORDER — ALENDRONATE SODIUM 70 MG/1
70 TABLET ORAL
Qty: 12 TABLET | Refills: 1 | Status: SHIPPED | OUTPATIENT
Start: 2024-06-29

## 2024-07-01 ENCOUNTER — ANESTHESIA EVENT (OUTPATIENT)
Dept: ANESTHESIOLOGY | Facility: AMBULATORY SURGERY CENTER | Age: 69
End: 2024-07-01

## 2024-07-01 ENCOUNTER — ANESTHESIA (OUTPATIENT)
Dept: ANESTHESIOLOGY | Facility: AMBULATORY SURGERY CENTER | Age: 69
End: 2024-07-01

## 2024-07-15 ENCOUNTER — ANESTHESIA EVENT (OUTPATIENT)
Dept: GASTROENTEROLOGY | Facility: AMBULATORY SURGERY CENTER | Age: 69
End: 2024-07-15

## 2024-07-15 ENCOUNTER — ANESTHESIA (OUTPATIENT)
Dept: GASTROENTEROLOGY | Facility: AMBULATORY SURGERY CENTER | Age: 69
End: 2024-07-15

## 2024-07-15 ENCOUNTER — HOSPITAL ENCOUNTER (OUTPATIENT)
Dept: GASTROENTEROLOGY | Facility: AMBULATORY SURGERY CENTER | Age: 69
Discharge: HOME | End: 2024-07-15
Payer: COMMERCIAL

## 2024-07-15 VITALS
WEIGHT: 200 LBS | DIASTOLIC BLOOD PRESSURE: 78 MMHG | TEMPERATURE: 97.3 F | BODY MASS INDEX: 31.39 KG/M2 | HEART RATE: 67 BPM | RESPIRATION RATE: 18 BRPM | OXYGEN SATURATION: 98 % | SYSTOLIC BLOOD PRESSURE: 129 MMHG | HEIGHT: 67 IN

## 2024-07-15 DIAGNOSIS — Z86.010 PERSONAL HISTORY OF COLONIC POLYPS: ICD-10-CM

## 2024-07-15 PROCEDURE — 88305 TISSUE EXAM BY PATHOLOGIST: CPT | Performed by: SPECIALIST

## 2024-07-15 PROCEDURE — 45380 COLONOSCOPY AND BIOPSY: CPT | Performed by: INTERNAL MEDICINE

## 2024-07-15 PROCEDURE — 88341 IMHCHEM/IMCYTCHM EA ADD ANTB: CPT | Performed by: SPECIALIST

## 2024-07-15 PROCEDURE — 88342 IMHCHEM/IMCYTCHM 1ST ANTB: CPT | Performed by: SPECIALIST

## 2024-07-15 RX ORDER — SODIUM CHLORIDE, SODIUM LACTATE, POTASSIUM CHLORIDE, CALCIUM CHLORIDE 600; 310; 30; 20 MG/100ML; MG/100ML; MG/100ML; MG/100ML
50 INJECTION, SOLUTION INTRAVENOUS CONTINUOUS
Status: DISCONTINUED | OUTPATIENT
Start: 2024-07-15 | End: 2024-07-19 | Stop reason: HOSPADM

## 2024-07-15 RX ORDER — PROPOFOL 10 MG/ML
INJECTION, EMULSION INTRAVENOUS AS NEEDED
Status: DISCONTINUED | OUTPATIENT
Start: 2024-07-15 | End: 2024-07-15

## 2024-07-15 RX ORDER — SODIUM CHLORIDE, SODIUM LACTATE, POTASSIUM CHLORIDE, CALCIUM CHLORIDE 600; 310; 30; 20 MG/100ML; MG/100ML; MG/100ML; MG/100ML
INJECTION, SOLUTION INTRAVENOUS CONTINUOUS PRN
Status: DISCONTINUED | OUTPATIENT
Start: 2024-07-15 | End: 2024-07-15

## 2024-07-15 RX ORDER — GLYCOPYRROLATE 0.2 MG/ML
INJECTION INTRAMUSCULAR; INTRAVENOUS AS NEEDED
Status: DISCONTINUED | OUTPATIENT
Start: 2024-07-15 | End: 2024-07-15

## 2024-07-15 RX ADMIN — SODIUM CHLORIDE, SODIUM LACTATE, POTASSIUM CHLORIDE, CALCIUM CHLORIDE 50 ML/HR: 600; 310; 30; 20 INJECTION, SOLUTION INTRAVENOUS at 11:27

## 2024-07-15 RX ADMIN — SODIUM CHLORIDE, SODIUM LACTATE, POTASSIUM CHLORIDE, CALCIUM CHLORIDE: 600; 310; 30; 20 INJECTION, SOLUTION INTRAVENOUS at 11:35

## 2024-07-15 RX ADMIN — GLYCOPYRROLATE 0.2 MG: 0.2 INJECTION INTRAMUSCULAR; INTRAVENOUS at 11:40

## 2024-07-15 RX ADMIN — PROPOFOL 50 MG: 10 INJECTION, EMULSION INTRAVENOUS at 11:39

## 2024-07-15 RX ADMIN — PROPOFOL 50 MG: 10 INJECTION, EMULSION INTRAVENOUS at 12:03

## 2024-07-15 RX ADMIN — PROPOFOL 50 MG: 10 INJECTION, EMULSION INTRAVENOUS at 11:52

## 2024-07-15 RX ADMIN — PROPOFOL 50 MG: 10 INJECTION, EMULSION INTRAVENOUS at 11:45

## 2024-07-15 RX ADMIN — PROPOFOL 50 MG: 10 INJECTION, EMULSION INTRAVENOUS at 11:48

## 2024-07-15 RX ADMIN — PROPOFOL 50 MG: 10 INJECTION, EMULSION INTRAVENOUS at 11:42

## 2024-07-15 RX ADMIN — SODIUM CHLORIDE, SODIUM LACTATE, POTASSIUM CHLORIDE, CALCIUM CHLORIDE: 600; 310; 30; 20 INJECTION, SOLUTION INTRAVENOUS at 11:12

## 2024-07-15 RX ADMIN — PROPOFOL 50 MG: 10 INJECTION, EMULSION INTRAVENOUS at 11:57

## 2024-07-15 RX ADMIN — PROPOFOL 50 MG: 10 INJECTION, EMULSION INTRAVENOUS at 12:10

## 2024-07-15 RX ADMIN — PROPOFOL 140 MG: 10 INJECTION, EMULSION INTRAVENOUS at 11:38

## 2024-07-15 NOTE — H&P
History and Physical - [facility]  Lin Lassiter 68 y.o. female MRN: [id number]    HPI: Lin Lassiter is a 68 y.o. year old female who presents for colonoscopy secondary to personal history of polyps    REVIEW OF SYSTEMS: Per the HPI, and otherwise unremarkable.    Historical Information   Past Medical History:   Diagnosis Date    Anxiety     Morales esophagus     Breast cancer (HCC) 09/24/2020    right mucinous Ca    Cancer (HCC) 9/20    Breast    Colon polyp     Gastroesophageal reflux disease without esophagitis 05/14/2019    GERD (gastroesophageal reflux disease)     History of gallstones     Hypertension     Personal history of colonic polyps 05/14/2019    Primary biliary cirrhosis (HCC) 05/14/2019    Seasonal allergies     Sleep apnea     does not wear cpap     Past Surgical History:   Procedure Laterality Date    APPENDECTOMY      BREAST BIOPSY Right 09/24/2020    stereo-  mucinous ca    BREAST BIOPSY Left 10/06/2020    us bx- neg    BREAST LUMPECTOMY Right 12/22/2020    Procedure: BREAST NEEDLE LOCALIZED LUMPECTOMY (NEEDLE LOC AT 1230);  Surgeon: Checo Knox MD;  [facility];  Service: Surgical Oncology    COLONOSCOPY  06/24/2020     2 adenomas.  Five year recall advised    INTRAOPERATIVE RADIATION THERAPY (IORT) Right 12/22/2020    Procedure: BREAST INTRAOPERATIVE RADIATION THERAPY (IORT) BY DR SOTO;  Surgeon: hCeco Knox MD;  [facility];  Service: Surgical Oncology    LYMPH NODE BIOPSY Right 12/22/2020    Procedure: SENTINEL LYMPH NODE BIOPSY; LYMPHATIC MAPPING WITH BLUE DYE AND RADIOACTIVE DYE (INJECT AT 1400 BY DR KNOX IN THE OR);  Surgeon: Checo Knox MD;  [facility];  Service: Surgical Oncology    MAMMO NEEDLE LOCALIZATION RIGHT (ALL INC) Right 12/22/2020    MAMMO STEREOTACTIC BREAST BIOPSY RIGHT (ALL INC) Right 09/24/2020    MAMMO STEREOTACTIC BREAST BIOPSY RIGHT (ALL INC) Right 11/20/2023    ORIF WRIST FRACTURE Left 2/24/2023    Procedure: OPEN  REDUCTION W/ INTERNAL FIXATION (ORIF) RADIUS / ULNA (WRIST), irrigation;  Surgeon: Campbell Eugene MD;  Location:  MAIN OR;  Service: Orthopedics    TUBAL LIGATION      UPPER GASTROINTESTINAL ENDOSCOPY  06/24/2019    irregular z-line.  biopsies negative for Morales's     Social History   Social History     Substance and Sexual Activity   Alcohol Use Yes    Alcohol/week: 6.0 standard drinks of alcohol    Types: 6 Glasses of wine per week    Comment: socially     Social History     Substance and Sexual Activity   Drug Use Never     Social History     Tobacco Use   Smoking Status Never   Smokeless Tobacco Never     Family History   Problem Relation Age of Onset    No Known Problems Mother     Lung cancer Father 50    No Known Problems Sister     No Known Problems Sister     No Known Problems Brother     No Known Problems Maternal Grandmother     No Known Problems Maternal Grandfather     No Known Problems Paternal Grandmother     No Known Problems Paternal Grandfather     No Known Problems Maternal Aunt     No Known Problems Paternal Aunt     No Known Problems Paternal Aunt     No Known Problems Daughter     Colon cancer Neg Hx     Colon polyps Neg Hx        Meds/Allergies       Current Outpatient Medications:     anastrozole (ARIMIDEX) 1 mg tablet    pantoprazole (PROTONIX) 40 mg tablet    sertraline (ZOLOFT) 100 mg tablet    ursodiol (ACTIGALL) 500 MG tablet    albuterol (PROVENTIL HFA,VENTOLIN HFA) 90 mcg/act inhaler    alendronate (FOSAMAX) 70 mg tablet    Azelastine HCl 137 MCG/SPRAY SOLN    Calcium Carbonate-Vitamin D (Calcium 500 + D) 500-125 MG-UNIT TABS    CRANBERRY EXTRACT PO    famotidine (PEPCID) 40 MG tablet    loratadine-pseudoephedrine (CLARITIN-D 24-HOUR)  mg per 24 hr tablet    Multiple Vitamins-Minerals (MULTIVITAMIN WOMEN 50+ PO)    Turmeric Curcumin 500 MG CAPS    zolpidem (AMBIEN) 10 mg tablet    Current Facility-Administered Medications:     lactated ringers infusion, 50 mL/hr, Intravenous,  "Continuous    Facility-Administered Medications Ordered in Other Encounters:     lactated ringers infusion, , Intravenous, Continuous PRN, New Bag at 07/15/24 1112    Allergies   Allergen Reactions    Aspirin GI Intolerance       Objective     /75   Pulse 83   Temp (!) 97.3 °F (36.3 °C) (Temporal)   Resp 17   Ht 5' 7\" (1.702 m)   Wt 90.7 kg (200 lb)   SpO2 96%   BMI 31.32 kg/m²     PHYSICAL EXAM    Gen: NAD AAOx3  Head: Normocephalic, Atraumatic  CV: S1S2 RRR no m/r/g  CHEST: Clear b/l no c/r/w  ABD: soft, +BS NT/ND  EXT: no edema    ASSESSMENT/PLAN:  This is a 68 y.o. year old female here for colonoscopy secondary to personal history of polyps, and she is stable and optimized for her procedure.        "

## 2024-07-15 NOTE — ANESTHESIA PREPROCEDURE EVALUATION
Procedure:  COLONOSCOPY    Relevant Problems   GI/HEPATIC   (+) Gastroesophageal reflux disease without esophagitis   (+) Primary biliary cirrhosis (HCC)      GYN   (+) Malignant neoplasm of upper-outer quadrant of right breast in female, estrogen receptor positive (HCC)      NEURO/PSYCH   (+) Anxiety   (+) Depression        Physical Exam    Airway    Mallampati score: II  TM Distance: >3 FB  Neck ROM: full     Dental   No notable dental hx     Cardiovascular      Pulmonary      Other Findings  post-pubertal.      Anesthesia Plan  ASA Score- 2     Anesthesia Type- IV sedation with anesthesia with ASA Monitors.         Additional Monitors:     Airway Plan:            Plan Factors-Exercise tolerance (METS): >4 METS.    Chart reviewed.    Patient summary reviewed.    Patient is not a current smoker.              Induction- intravenous.    Postoperative Plan-         Informed Consent- Anesthetic plan and risks discussed with patient.  I personally reviewed this patient with the CRNA. Discussed and agreed on the Anesthesia Plan with the CRNA..

## 2024-07-15 NOTE — ANESTHESIA POSTPROCEDURE EVALUATION
Post-Op Assessment Note    CV Status:  Stable  Pain Score: 0    Pain management: adequate    Multimodal analgesia used between 6 hours prior to anesthesia start to PACU discharge    Mental Status:  Alert and awake   Hydration Status:  Euvolemic and stable   PONV Controlled:  Controlled   Airway Patency:  Patent  Two or more mitigation strategies used for obstructive sleep apnea   Post Op Vitals Reviewed: Yes    No anethesia notable event occurred.    Staff: CRNA               BP   111/66   Temp   98   Pulse  70   Resp   12   SpO2   96

## 2024-07-16 ENCOUNTER — TELEPHONE (OUTPATIENT)
Age: 69
End: 2024-07-16

## 2024-07-16 ENCOUNTER — NURSE TRIAGE (OUTPATIENT)
Age: 69
End: 2024-07-16

## 2024-07-16 NOTE — TELEPHONE ENCOUNTER
Pt called in regard to post colonoscopy symptoms. Call was transferred to Deepa the nurse to assist.

## 2024-07-16 NOTE — TELEPHONE ENCOUNTER
"Patient had colonoscopy with Dr. Siu yesterday.  States had several clips placed.  Patient states she feels clips on exterior while showering.   Patient denies bleeding but unsure why she can feel them.      Called patient back and let her know that there was no problem.  Advised if she starts bleeding more that 15 minutes or starts having pain to call back.  Patient stated understanding.        Reason for Disposition   [1] Caller has NON-URGENT question AND [2] triager unable to answer question    Answer Assessment - Initial Assessment Questions  1. DATE/TIME: \"When did you have your colonoscopy?\"       7/15/24  2. MAIN CONCERN: \"What is your main concern right now?\" \"What questions do you have?\"    Why can patient feel endoclips.  Denies bleeding or pain.    Protocols used: Colonoscopy Symptoms and Questions-ADULT-    "

## 2024-07-24 PROCEDURE — 88342 IMHCHEM/IMCYTCHM 1ST ANTB: CPT | Performed by: SPECIALIST

## 2024-07-24 PROCEDURE — 88341 IMHCHEM/IMCYTCHM EA ADD ANTB: CPT | Performed by: SPECIALIST

## 2024-07-24 PROCEDURE — 88305 TISSUE EXAM BY PATHOLOGIST: CPT | Performed by: SPECIALIST

## 2024-08-10 DIAGNOSIS — K21.9 GASTROESOPHAGEAL REFLUX DISEASE WITHOUT ESOPHAGITIS: ICD-10-CM

## 2024-08-11 RX ORDER — PANTOPRAZOLE SODIUM 40 MG/1
40 TABLET, DELAYED RELEASE ORAL 2 TIMES DAILY
Qty: 180 TABLET | Refills: 1 | Status: SHIPPED | OUTPATIENT
Start: 2024-08-11

## 2024-10-14 ENCOUNTER — HOSPITAL ENCOUNTER (OUTPATIENT)
Dept: MAMMOGRAPHY | Facility: CLINIC | Age: 69
Discharge: HOME | End: 2024-10-14
Payer: COMMERCIAL

## 2024-10-14 VITALS — HEIGHT: 67 IN | BODY MASS INDEX: 31.39 KG/M2 | WEIGHT: 200 LBS

## 2024-10-14 DIAGNOSIS — R92.8 FOLLOW-UP EXAMINATION OF ABNORMAL MAMMOGRAM: ICD-10-CM

## 2024-10-14 PROCEDURE — 77066 DX MAMMO INCL CAD BI: CPT

## 2024-10-14 PROCEDURE — G0279 TOMOSYNTHESIS, MAMMO: HCPCS

## 2024-12-04 DIAGNOSIS — K74.3 PRIMARY BILIARY CIRRHOSIS (HCC): ICD-10-CM

## 2024-12-05 ENCOUNTER — OFFICE VISIT (OUTPATIENT)
Age: 69
End: 2024-12-05
Payer: COMMERCIAL

## 2024-12-05 VITALS
SYSTOLIC BLOOD PRESSURE: 126 MMHG | WEIGHT: 202 LBS | HEART RATE: 93 BPM | HEIGHT: 67 IN | OXYGEN SATURATION: 97 % | RESPIRATION RATE: 18 BRPM | DIASTOLIC BLOOD PRESSURE: 85 MMHG | TEMPERATURE: 97.6 F | BODY MASS INDEX: 31.71 KG/M2

## 2024-12-05 DIAGNOSIS — M81.0 OSTEOPOROSIS, UNSPECIFIED OSTEOPOROSIS TYPE, UNSPECIFIED PATHOLOGICAL FRACTURE PRESENCE: ICD-10-CM

## 2024-12-05 DIAGNOSIS — C50.411 MALIGNANT NEOPLASM OF UPPER-OUTER QUADRANT OF RIGHT BREAST IN FEMALE, ESTROGEN RECEPTOR POSITIVE (HCC): Primary | ICD-10-CM

## 2024-12-05 DIAGNOSIS — Z17.0 MALIGNANT NEOPLASM OF UPPER-OUTER QUADRANT OF RIGHT BREAST IN FEMALE, ESTROGEN RECEPTOR POSITIVE (HCC): Primary | ICD-10-CM

## 2024-12-05 PROCEDURE — 99213 OFFICE O/P EST LOW 20 MIN: CPT | Performed by: INTERNAL MEDICINE

## 2024-12-05 RX ORDER — URSODIOL 500 MG/1
1000 TABLET, FILM COATED ORAL 2 TIMES DAILY
Qty: 120 TABLET | Refills: 0 | Status: SHIPPED | OUTPATIENT
Start: 2024-12-05

## 2024-12-05 RX ORDER — ANASTROZOLE 1 MG/1
1 TABLET ORAL DAILY
Qty: 90 TABLET | Refills: 3 | Status: SHIPPED | OUTPATIENT
Start: 2024-12-05

## 2024-12-05 NOTE — PROGRESS NOTES
[facility]  [address]  [facility] [[email or web address]] [address]  [place] [address] [phone]     Date of Visit: 12/5/2024  Name: Lin Lassiter   YOB: 1955         Assessment/Plan  69year-old postmenopausal woman with stage IA right breast cancer, grade 1 with mucinous histology, % positive, NJ 25% positive, HER2 negative disease. She underwent lumpectomy and sentinel lymph node biopsy in Dec 2020, resulting in TAMARA.  Since January 2021, she has been on adjuvant hormonal therapy with anastrozole with excellent tolerance.      She has no evidence recurrent disease, based on her symptoms and physical examinations.  Her last mammogram was from October 14, 2024, which was unremarkable.    Plan is to complete 5 years of hormonal therapy.   Last dexa was from April 2023 -- osteoporosis and is already on fosamax.    Repeat mammogram in oct 2025.    Return in about 48 weeks (around 11/6/2025) for Office visit with scans prior.     The patient had many questions that were answered to their apparent satisfaction.  Patient has been strongly urged to call with any questions or concerns.       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  Grade 1    NJ 25  HER2 1+  Lymphovascular invasion not identified    Concordant. Malignancy appears unifocal; calcifications span 2.3cm. US right axila has not been performed. Left breast US recommended. 10/6/2020 B/L US - no right axillary adenopathy, benign findings in left breast.     12/22/2020 Surgery    Right breast needle localized lumpectomy with sentinel lymph node biopsy  Mucinous carcinoma  Grade 1  1.1 mm  Extensive DCIS (at least 4 cm)  DCIS less than 1 mm form lateral  posterior lumpectomy margin  0/1 Lymph node  Anatomic/Prognostic Stage IA     12/22/2020 - 12/22/2020 Radiation    (Treatments not in [facility])  IORT with Dr. Palumbo  Field Numbers Energy Treatment Site Starting  Ending  Elapsed  Fraction Total  Overlap Site Overlap         Date Date Days Dose Dose   Dose    IORT 50 kVp  Rt Breast   12/22/20 12/22/20  0  2000 cGy  2000 cGy                                   1/15/2021 -  Hormone Therapy    Anastrozole 1 mg daily    Dr. Trinidad        HISTORY OF PRESENT ILLNESS:   Lin Lassiter is a 69 y.o. female  who is here today for follow-up of breast cancer.  She is a postmenopausal woman who was found to have radiographic abnormality in her right breast for which she underwent biopsy in September 24, 2020. She had invasive mucinous carcinoma, grade 1.  This was % positive, RI 25% positive, HER2 negative disease.  Subsequently, she underwent lumpectomy and sentinel lymph node biopsy by Dr. Lee in December 22, 2020 which showed 2 mm and 1.1 mm of mucinous carcinoma, grade 1.  There was no evidence of lymphovascular invasion.  1 sentinel lymph node was negative for metastatic disease.       On anastrozole since Jan 2021.   She previously followed with Dr. Trinidad, and subsequently transferred under my care in late 2023.     Since her last visit, she had an abnormal MM in 2023 and underwent a right breast stereotactic bx on 11/202/23. Pathology was benign.    Here for annual follow up    Subjective    No complaints  Tolerating arimidex well.   Occasional hot flashes, not too bothersome  No new breast concerns  Appetite is good  No chest pains, shortness of breath, abdominal pain    REVIEW OF SYSTEMS:  14 point review of systems is otherwise negative.      Current Outpatient Medications:     anastrozole (ARIMIDEX) 1 mg tablet, Take 1 tablet (1 mg total) by mouth daily, Disp: 90 tablet, Rfl: 3    Calcium Carbonate-Vitamin D (Calcium 500 + D) 500-125 MG-UNIT TABS, Take by  mouth daily, Disp: , Rfl:     CRANBERRY EXTRACT PO, Take by mouth, Disp: , Rfl:     loratadine-pseudoephedrine (CLARITIN-D 24-HOUR)  mg per 24 hr tablet, Take 1 tablet by mouth as needed for allergies, Disp: , Rfl:     Multiple Vitamins-Minerals (MULTIVITAMIN WOMEN 50+ PO), Take by mouth daily , Disp: , Rfl:     pantoprazole (PROTONIX) 40 mg tablet, take 1 tablet by mouth twice a day, Disp: 180 tablet, Rfl: 1    sertraline (ZOLOFT) 100 mg tablet, Take 100 mg by mouth daily, Disp: , Rfl:     Turmeric Curcumin 500 MG CAPS, Take by mouth daily, Disp: , Rfl:     ursodiol (ACTIGALL) 500 MG tablet, take 2 tablets by mouth twice a day, Disp: 360 tablet, Rfl: 1    zolpidem (AMBIEN) 10 mg tablet, Take 10 mg by mouth daily at bedtime, Disp: , Rfl:     alendronate (FOSAMAX) 70 mg tablet, take 1 tablet by mouth every week, Disp: 12 tablet, Rfl: 1    Azelastine HCl 137 MCG/SPRAY SOLN, into each nostril daily as needed, Disp: , Rfl:      Allergies   Allergen Reactions    Aspirin GI Intolerance        ACTIVE PROBLEMS:  Patient Active Problem List   Diagnosis    Personal history of colonic polyps    Gastroesophageal reflux disease without esophagitis    Primary biliary cirrhosis (HCC)    Morales's esophagus without dysplasia    Malignant neoplasm of upper-outer quadrant of right breast in female, estrogen receptor positive (HCC)    Use of anastrozole (Arimidex)    COVID-19    Anxiety    Depression    Fracture of distal radius and ulna, left, open type I or II, initial encounter    Hypokalemia          Past Medical History:   Diagnosis Date    Anxiety     Morales esophagus     Breast cancer (HCC) 09/24/2020    right mucinous Ca    Cancer (HCC) 9/20    Breast    Colon polyp     Gastroesophageal reflux disease without esophagitis 05/14/2019    GERD (gastroesophageal reflux disease)     History of gallstones     Hypertension     Personal history of colonic polyps 05/14/2019    Primary biliary cirrhosis (HCC) 05/14/2019     Seasonal allergies     Sleep apnea     does not wear cpap        Past Surgical History:   Procedure Laterality Date    APPENDECTOMY      BREAST BIOPSY Right 09/24/2020    stereo-  mucinous ca    BREAST BIOPSY Left 10/06/2020    us bx- neg    BREAST LUMPECTOMY Right 12/22/2020    Procedure: BREAST NEEDLE LOCALIZED LUMPECTOMY (NEEDLE LOC AT 1230);  Surgeon: Checo Knox MD;  Location: [facility];  Service: Surgical Oncology    COLONOSCOPY  06/24/2020     2 adenomas.  Five year recall advised    INTRAOPERATIVE RADIATION THERAPY (IORT) Right 12/22/2020    Procedure: BREAST INTRAOPERATIVE RADIATION THERAPY (IORT) BY DR SOTO;  Surgeon: Checo Knox MD;  [facility];  Service: Surgical Oncology    LYMPH NODE BIOPSY Right 12/22/2020    Procedure: SENTINEL LYMPH NODE BIOPSY; LYMPHATIC MAPPING WITH BLUE DYE AND RADIOACTIVE DYE (INJECT AT 1400 BY DR KNOX IN THE OR);  Surgeon: Checo Knox MD;  Location[facility];  Service: Surgical Oncology    MAMMO NEEDLE LOCALIZATION RIGHT (ALL INC) Right 12/22/2020    MAMMO STEREOTACTIC BREAST BIOPSY RIGHT (ALL INC) Right 09/24/2020    MAMMO STEREOTACTIC BREAST BIOPSY RIGHT (ALL INC) Right 11/20/2023    ORIF WRIST FRACTURE Left 2/24/2023    Procedure: OPEN REDUCTION W/ INTERNAL FIXATION (ORIF) RADIUS / ULNA (WRIST), irrigation;  Surgeon: Campbell Eugene MD;  Location: [facility];  Service: Orthopedics    TUBAL LIGATION      UPPER GASTROINTESTINAL ENDOSCOPY  06/24/2019    irregular z-line.  biopsies negative for Morales's        Social History     Socioeconomic History    Marital status: /Civil Union     Spouse name: Not on file    Number of children: Not on file    Years of education: Not on file    Highest education level: Not on file   Occupational History    Not on file   Tobacco Use    Smoking status: Never    Smokeless tobacco: Never   Vaping Use    Vaping status: Never Used   Substance and Sexual Activity    Alcohol use: Yes     Alcohol/week: 6.0 standard drinks of  "alcohol     Types: 6 Glasses of wine per week     Comment: socially    Drug use: Never    Sexual activity: Not Currently   Other Topics Concern    Not on file   Social History Narrative    Not on file     Social Drivers of Health     Financial Resource Strain: Not on file   Food Insecurity: No Food Insecurity (2/24/2023)    Hunger Vital Sign     Worried About Running Out of Food in the Last Year: Never true     Ran Out of Food in the Last Year: Never true   Transportation Needs: No Transportation Needs (2/24/2023)    PRAPARE - Transportation     Lack of Transportation (Medical): No     Lack of Transportation (Non-Medical): No   Physical Activity: Not on file   Stress: Not on file   Social Connections: Not on file   Intimate Partner Violence: Not on file   Housing Stability: Low Risk  (2/24/2023)    Housing Stability Vital Sign     Unable to Pay for Housing in the Last Year: No     Number of Places Lived in the Last Year: 1     Unstable Housing in the Last Year: No        Family History   Problem Relation Age of Onset    No Known Problems Mother     Lung cancer Father 50    No Known Problems Sister     No Known Problems Sister     No Known Problems Brother     No Known Problems Maternal Grandmother     No Known Problems Maternal Grandfather     No Known Problems Paternal Grandmother     No Known Problems Paternal Grandfather     No Known Problems Maternal Aunt     No Known Problems Paternal Aunt     No Known Problems Paternal Aunt     No Known Problems Daughter     Colon cancer Neg Hx     Colon polyps Neg Hx         Objective        Physical Exam  ECOG performance status: 0  Blood pressure 126/85, pulse 93, temperature 97.6 °F (36.4 °C), temperature source Temporal, resp. rate 18, height 5' 7\" (1.702 m), weight 91.6 kg (202 lb), SpO2 97%.     General appearance: Not in acute distress, well appearing Alert and oriented.    Normal breath sounds bilaterally.  Clear to auscultation without any added sounds  Heart sounds " are normal.  No murmurs noted.    Extremities without any edema.   Breast exam shows the right breast lumpectomy site.  There are no palpable masses.  Normal skin.  Normal nipple areolar complex.  There are no palpable cervical or axillary lymphadenopathy.  No tenderness on exam.    I reviewed lab data in the chart as noted above.  Additional labs as noted below    WBC   Date Value Ref Range Status   03/22/2024 7.55 4.31 - 10.16 Thousand/uL Final   02/25/2023 10.43 (H) 4.31 - 10.16 Thousand/uL Final   02/24/2023 9.05 4.31 - 10.16 Thousand/uL Final     Hemoglobin   Date Value Ref Range Status   03/22/2024 14.0 11.5 - 15.4 g/dL Final   02/25/2023 11.5 11.5 - 15.4 g/dL Final   02/24/2023 12.4 11.5 - 15.4 g/dL Final     Platelets   Date Value Ref Range Status   03/22/2024 385 149 - 390 Thousands/uL Final   02/25/2023 267 149 - 390 Thousands/uL Final   02/24/2023 281 149 - 390 Thousands/uL Final     MCV   Date Value Ref Range Status   03/22/2024 92 82 - 98 fL Final   02/25/2023 93 82 - 98 fL Final   02/24/2023 91 82 - 98 fL Final      Potassium   Date Value Ref Range Status   03/22/2024 4.1 3.5 - 5.3 mmol/L Final   05/01/2023 3.9 3.5 - 5.3 mmol/L Final   02/25/2023 4.3 3.5 - 5.3 mmol/L Final     Chloride   Date Value Ref Range Status   03/22/2024 97 96 - 108 mmol/L Final   05/01/2023 104 96 - 108 mmol/L Final   02/25/2023 105 96 - 108 mmol/L Final     CO2   Date Value Ref Range Status   03/22/2024 28 21 - 32 mmol/L Final   05/01/2023 27 21 - 32 mmol/L Final   02/25/2023 25 21 - 32 mmol/L Final     BUN   Date Value Ref Range Status   03/22/2024 11 5 - 25 mg/dL Final   05/01/2023 14 5 - 25 mg/dL Final   02/25/2023 15 5 - 25 mg/dL Final     Creatinine   Date Value Ref Range Status   03/22/2024 0.65 0.60 - 1.30 mg/dL Final     Comment:     Standardized to IDMS reference method   05/01/2023 0.86 0.60 - 1.30 mg/dL Final     Comment:     Standardized to IDMS reference method   02/25/2023 0.57 (L) 0.60 - 1.30 mg/dL Final      Comment:     Standardized to IDMS reference method     Glucose   Date Value Ref Range Status   02/25/2023 113 65 - 140 mg/dL Final     Comment:     If the patient is fasting, the ADA then defines impaired fasting glucose as > 100 mg/dL and diabetes as > or equal to 123 mg/dL.  Specimen collection should occur prior to Sulfasalazine administration due to the potential for falsely depressed results. Specimen collection should occur prior to Sulfapyridine administration due to the potential for falsely elevated results.   02/24/2023 125 65 - 140 mg/dL Final     Comment:     If the patient is fasting, the ADA then defines impaired fasting glucose as > 100 mg/dL and diabetes as > or equal to 123 mg/dL.  Specimen collection should occur prior to Sulfasalazine administration due to the potential for falsely depressed results. Specimen collection should occur prior to Sulfapyridine administration due to the potential for falsely elevated results.   02/23/2023 105 65 - 140 mg/dL Final     Comment:     If the patient is fasting, the ADA then defines impaired fasting glucose as > 100 mg/dL and diabetes as > or equal to 123 mg/dL.  Specimen collection should occur prior to Sulfasalazine administration due to the potential for falsely depressed results. Specimen collection should occur prior to Sulfapyridine administration due to the potential for falsely elevated results.     Calcium   Date Value Ref Range Status   03/22/2024 9.5 8.4 - 10.2 mg/dL Final   05/01/2023 9.8 8.3 - 10.1 mg/dL Final   02/25/2023 8.7 8.4 - 10.2 mg/dL Final     Albumin   Date Value Ref Range Status   03/22/2024 4.5 3.5 - 5.0 g/dL Final   07/27/2023 4.0 3.5 - 5.0 g/dL Final   05/01/2023 4.1 3.5 - 5.0 g/dL Final     Total Bilirubin   Date Value Ref Range Status   03/22/2024 0.49 0.20 - 1.00 mg/dL Final     Comment:     Use of this assay is not recommended for patients undergoing treatment with eltrombopag due to the potential for falsely elevated  "results.  N-acetyl-p-benzoquinone imine (metabolite of Acetaminophen) will generate erroneously low results in samples for patients that have taken an overdose of Acetaminophen.   07/27/2023 0.60 0.20 - 1.00 mg/dL Final     Comment:     Use of this assay is not recommended for patients undergoing treatment with eltrombopag due to the potential for falsely elevated results.   05/01/2023 0.39 0.20 - 1.00 mg/dL Final     Comment:     Use of this assay is not recommended for patients undergoing treatment with eltrombopag due to the potential for falsely elevated results.     Alkaline Phosphatase   Date Value Ref Range Status   03/22/2024 101 34 - 104 U/L Final   07/27/2023 124 (H) 46 - 116 U/L Final   05/01/2023 130 (H) 46 - 116 U/L Final     AST   Date Value Ref Range Status   03/22/2024 18 13 - 39 U/L Final   07/27/2023 15 5 - 45 U/L Final     Comment:     Specimen collection should occur prior to Sulfasalazine and/or Sulfapyridine administration due to the potential for falsely depressed results.    05/01/2023 18 5 - 45 U/L Final     Comment:     Specimen collection should occur prior to Sulfasalazine administration due to the potential for falsely depressed results.      ALT   Date Value Ref Range Status   03/22/2024 19 7 - 52 U/L Final     Comment:     Specimen collection should occur prior to Sulfasalazine administration due to the potential for falsely depressed results.    07/27/2023 23 12 - 78 U/L Final     Comment:     Specimen collection should occur prior to Sulfasalazine and/or Sulfapyridine administration due to the potential for falsely depressed results.    05/01/2023 25 12 - 78 U/L Final     Comment:     Specimen collection should occur prior to Sulfasalazine and/or Sulfapyridine administration due to the potential for falsely depressed results.       No results found for: \"LDH\"  No results found for: \"TSH\"  No results found for: \"N4CAOGX\"   No results found for: \"FREET4\"      RECENT IMAGING:  No " "results found.       Portions of the record may have been created with voice recognition software.  Occasional wrong word or \"sound a like\" substitutions may have occurred due to the inherent limitations of voice recognition software.  Read the chart carefully and recognize, using context, where substitutions have occurred.    "

## 2024-12-09 DIAGNOSIS — M81.0 OSTEOPOROSIS, UNSPECIFIED OSTEOPOROSIS TYPE, UNSPECIFIED PATHOLOGICAL FRACTURE PRESENCE: ICD-10-CM

## 2024-12-10 RX ORDER — ALENDRONATE SODIUM 70 MG/1
70 TABLET ORAL
Qty: 12 TABLET | Refills: 1 | Status: SHIPPED | OUTPATIENT
Start: 2024-12-10

## 2025-01-04 DIAGNOSIS — K74.3 PRIMARY BILIARY CIRRHOSIS (HCC): ICD-10-CM

## 2025-01-06 RX ORDER — URSODIOL 500 MG/1
1000 TABLET, FILM COATED ORAL 2 TIMES DAILY
Qty: 120 TABLET | Refills: 0 | Status: SHIPPED | OUTPATIENT
Start: 2025-01-06

## 2025-01-30 DIAGNOSIS — K74.3 PRIMARY BILIARY CIRRHOSIS (HCC): ICD-10-CM

## 2025-01-30 RX ORDER — URSODIOL 500 MG/1
1000 TABLET, FILM COATED ORAL 2 TIMES DAILY
Qty: 120 TABLET | Refills: 5 | Status: SHIPPED | OUTPATIENT
Start: 2025-01-30

## 2025-01-30 NOTE — TELEPHONE ENCOUNTER
Reason for call:   [x] Refill   [] Prior Auth  [] Other:     Office:   [] PCP/Provider -   [x] Specialty/Provider -Darien Siu/[facility] [place]       Medication: Actigall    Dose/Frequency: 500 mg     Quantity: #120    Pharmacy: [facility] [id number] [facility]    Does the patient have enough for 3 days?   [] Yes   [x] No - Send as HP to POD

## 2025-02-01 DIAGNOSIS — K21.9 GASTROESOPHAGEAL REFLUX DISEASE WITHOUT ESOPHAGITIS: ICD-10-CM

## 2025-02-03 RX ORDER — PANTOPRAZOLE SODIUM 40 MG/1
40 TABLET, DELAYED RELEASE ORAL 2 TIMES DAILY
Qty: 180 TABLET | Refills: 1 | Status: SHIPPED | OUTPATIENT
Start: 2025-02-03

## 2025-02-21 ENCOUNTER — TELEPHONE (OUTPATIENT)
Age: 70
End: 2025-02-21

## 2025-02-21 ENCOUNTER — OFFICE VISIT (OUTPATIENT)
Dept: FAMILY MEDICINE CLINIC | Facility: HOSPITAL | Age: 70
End: 2025-02-21
Payer: COMMERCIAL

## 2025-02-21 VITALS
TEMPERATURE: 98 F | HEART RATE: 80 BPM | WEIGHT: 200.6 LBS | OXYGEN SATURATION: 99 % | DIASTOLIC BLOOD PRESSURE: 96 MMHG | SYSTOLIC BLOOD PRESSURE: 150 MMHG | HEIGHT: 67 IN | BODY MASS INDEX: 31.48 KG/M2

## 2025-02-21 DIAGNOSIS — J32.9 CHRONIC SINUSITIS, UNSPECIFIED LOCATION: ICD-10-CM

## 2025-02-21 DIAGNOSIS — E66.9 OBESITY (BMI 30-39.9): ICD-10-CM

## 2025-02-21 DIAGNOSIS — R73.01 ABNORMAL FASTING GLUCOSE: ICD-10-CM

## 2025-02-21 DIAGNOSIS — Z13.6 SCREENING FOR CARDIOVASCULAR CONDITION: ICD-10-CM

## 2025-02-21 DIAGNOSIS — Z13.0 SCREENING FOR DEFICIENCY ANEMIA: ICD-10-CM

## 2025-02-21 DIAGNOSIS — Z76.89 ENCOUNTER TO ESTABLISH CARE: Primary | ICD-10-CM

## 2025-02-21 DIAGNOSIS — Z13.29 SCREENING FOR THYROID DISORDER: ICD-10-CM

## 2025-02-21 DIAGNOSIS — F41.9 ANXIETY: ICD-10-CM

## 2025-02-21 PROCEDURE — 99204 OFFICE O/P NEW MOD 45 MIN: CPT

## 2025-02-21 RX ORDER — FLUTICASONE PROPIONATE 50 MCG
1 SPRAY, SUSPENSION (ML) NASAL DAILY
Qty: 11.1 ML | Refills: 0 | Status: SHIPPED | OUTPATIENT
Start: 2025-02-21

## 2025-02-21 RX ORDER — LORAZEPAM 1 MG/1
1 TABLET ORAL 2 TIMES DAILY
COMMUNITY
Start: 2025-02-12

## 2025-02-21 RX ORDER — CETIRIZINE HYDROCHLORIDE, PSEUDOEPHEDRINE HYDROCHLORIDE 5; 120 MG/1; MG/1
1 TABLET, FILM COATED, EXTENDED RELEASE ORAL DAILY
COMMUNITY

## 2025-02-21 NOTE — TELEPHONE ENCOUNTER
Patient was seen in office today, was given some samples, unfortunately she left without grabbing the samples. Please put the samples asides per patient request and she will stop by the office Monday to collect. Thank you

## 2025-02-21 NOTE — PROGRESS NOTES
Name: Lin Lassiter      : 1955      MRN: [id number]  Encounter Provider: Sravanthi Gonzalez DO  Encounter Date: 2025   Encounter department: [facility] CARE [address]  :  Assessment & Plan  Encounter to establish care         Anxiety  Controlled  Current regimen: Zoloft 100 mg daily, Ativan as needed  Reports compliance with Ativan and Zoloft.  States Ativan is working better than Zoloft    Plan  Will have patient decrease Zoloft to 50 mg daily X 4 weeks, then discontinue  PDMP reviewed.  Ativan is not due for refill         Chronic sinusitis, unspecified location  Uncontrolled  Dry nasal mucosa physical exam  Current regimen Zyrtec, Afrin daily    Plan:   Will maintain patient on Zyrtec, discontinue Afrin, start patient on Flonase nasal spray.  Patient also given nasal rinse samples  Orders:    fluticasone (FLONASE) 50 mcg/act nasal spray; 1 spray into each nostril daily    Screening for cardiovascular condition    Orders:    Lipid panel; Future    Hemoglobin A1C; Future    Obesity (BMI 30-39.9)      Orders:    Lipid panel; Future    Hemoglobin A1C; Future    Abnormal fasting glucose    Orders:    Hemoglobin A1C; Future    Screening for thyroid disorder    Orders:    TSH, 3rd generation with Free T4 reflex; Future    Screening for deficiency anemia    Orders:    CBC and differential; Future           History of Present Illness   Anxiety  - States zoloft is not helping  - Has been on for 5 years  - Not working for the past 6 months, no particular events at that time  - Has tried Prozac, Pristiq,   - Prior living in [place], but then followed with private practice PCP  - Reports situational anxiety/panic attacks rather than underlying day to day anxiety  - Appetite: fluctuates  - Sleep: ambien  - No therapy at this time, has a strong support network  -Reports Ativan is working better than Zoloft.  Is interested in stopping Zoloft    Heart screening  - No CP or SOB outside  "of anxiety    Chronic sinusitis  - Reports compliance with zyrtec and nasal spray 2-3x/day Afrin  - Denies fever  - Get ears ache with sinus infections  - Has gotten 2 sinus infections already this year  - Not seen ENT in past, he is not interested in establishing with specialist at this particular time.  Like to try an alternative medication first  - Denies h/o asthma      Review of Systems   Constitutional:  Negative for appetite change.   HENT:  Positive for congestion, sinus pressure and sinus pain.    Allergic/Immunologic: Positive for environmental allergies.   Psychiatric/Behavioral:  Negative for sleep disturbance. The patient is nervous/anxious.        Objective   /96   Pulse 80   Temp 98 °F (36.7 °C)   Ht 5' 7\" (1.702 m)   Wt 91 kg (200 lb 9.6 oz)   SpO2 99%   BMI 31.42 kg/m²      Physical Exam  Vitals reviewed.   Constitutional:       General: She is not in acute distress.     Appearance: Normal appearance. She is well-developed. She is obese. She is not ill-appearing.   HENT:      Head: Normocephalic and atraumatic.      Right Ear: Tympanic membrane, ear canal and external ear normal. There is no impacted cerumen.      Left Ear: Tympanic membrane, ear canal and external ear normal. There is no impacted cerumen.      Nose:      Comments: Pinpoint bleeding of nasal turbinates b/l     Mouth/Throat:      Mouth: Mucous membranes are moist.   Eyes:      Conjunctiva/sclera: Conjunctivae normal.   Cardiovascular:      Rate and Rhythm: Normal rate and regular rhythm.      Heart sounds: Normal heart sounds.   Pulmonary:      Effort: Pulmonary effort is normal.      Breath sounds: Normal breath sounds.   Musculoskeletal:         General: Normal range of motion.   Skin:     General: Skin is warm and dry.   Neurological:      Mental Status: She is alert.      Gait: Gait normal.   Psychiatric:         Mood and Affect: Mood normal.         Behavior: Behavior normal.         This note was completed in part " utilizing direct voice recognition software.   Grammatical errors, random word insertion, spelling mistakes, and incomplete sentences may be an occasional consequence of the system secondary to software limitations, ambient noise and hardware issues. At the time of dictation, efforts were made to edit, clarify and /or correct errors.  Please read the chart carefully and recognize, using context, where substitutions have occurred.  If you have any questions or concerns about the context, text or information contained within the body of this dictation, please contact myself, the provider, for further clarification.     Sravanthi Gonzalez, DO  Family Medicine

## 2025-02-21 NOTE — ASSESSMENT & PLAN NOTE
Controlled  Current regimen: Zoloft 100 mg daily, Ativan as needed  Reports compliance with Ativan and Zoloft.  States Ativan is working better than Zoloft    Plan  Will have patient decrease Zoloft to 50 mg daily X 4 weeks, then discontinue  PDMP reviewed.  Ativan is not due for refill

## 2025-02-28 ENCOUNTER — APPOINTMENT (OUTPATIENT)
Dept: LAB | Facility: HOSPITAL | Age: 70
End: 2025-02-28
Payer: COMMERCIAL

## 2025-02-28 DIAGNOSIS — Z13.0 SCREENING FOR DEFICIENCY ANEMIA: ICD-10-CM

## 2025-02-28 DIAGNOSIS — Z13.6 SCREENING FOR CARDIOVASCULAR CONDITION: ICD-10-CM

## 2025-02-28 DIAGNOSIS — E66.9 OBESITY (BMI 30-39.9): ICD-10-CM

## 2025-02-28 DIAGNOSIS — R73.01 ABNORMAL FASTING GLUCOSE: ICD-10-CM

## 2025-02-28 DIAGNOSIS — Z13.29 SCREENING FOR THYROID DISORDER: ICD-10-CM

## 2025-02-28 LAB
BASOPHILS # BLD AUTO: 0.07 THOUSANDS/ÂΜL (ref 0–0.1)
BASOPHILS NFR BLD AUTO: 1 % (ref 0–1)
CHOLEST SERPL-MCNC: 192 MG/DL (ref ?–200)
EOSINOPHIL # BLD AUTO: 0.13 THOUSAND/ÂΜL (ref 0–0.61)
EOSINOPHIL NFR BLD AUTO: 2 % (ref 0–6)
ERYTHROCYTE [DISTWIDTH] IN BLOOD BY AUTOMATED COUNT: 13.4 % (ref 11.6–15.1)
EST. AVERAGE GLUCOSE BLD GHB EST-MCNC: 128 MG/DL
HBA1C MFR BLD: 6.1 %
HCT VFR BLD AUTO: 42.6 % (ref 34.8–46.1)
HDLC SERPL-MCNC: 75 MG/DL
HGB BLD-MCNC: 14.2 G/DL (ref 11.5–15.4)
IMM GRANULOCYTES # BLD AUTO: 0.05 THOUSAND/UL (ref 0–0.2)
IMM GRANULOCYTES NFR BLD AUTO: 1 % (ref 0–2)
LDLC SERPL CALC-MCNC: 97 MG/DL (ref 0–100)
LYMPHOCYTES # BLD AUTO: 2.1 THOUSANDS/ÂΜL (ref 0.6–4.47)
LYMPHOCYTES NFR BLD AUTO: 26 % (ref 14–44)
MCH RBC QN AUTO: 30.1 PG (ref 26.8–34.3)
MCHC RBC AUTO-ENTMCNC: 33.3 G/DL (ref 31.4–37.4)
MCV RBC AUTO: 90 FL (ref 82–98)
MONOCYTES # BLD AUTO: 0.55 THOUSAND/ÂΜL (ref 0.17–1.22)
MONOCYTES NFR BLD AUTO: 7 % (ref 4–12)
NEUTROPHILS # BLD AUTO: 5.32 THOUSANDS/ÂΜL (ref 1.85–7.62)
NEUTS SEG NFR BLD AUTO: 63 % (ref 43–75)
NONHDLC SERPL-MCNC: 117 MG/DL
NRBC BLD AUTO-RTO: 0 /100 WBCS
PLATELET # BLD AUTO: 323 THOUSANDS/UL (ref 149–390)
PMV BLD AUTO: 10.4 FL (ref 8.9–12.7)
RBC # BLD AUTO: 4.71 MILLION/UL (ref 3.81–5.12)
TRIGL SERPL-MCNC: 98 MG/DL (ref ?–150)
TSH SERPL DL<=0.05 MIU/L-ACNC: 1.22 UIU/ML (ref 0.45–4.5)
WBC # BLD AUTO: 8.22 THOUSAND/UL (ref 4.31–10.16)

## 2025-02-28 PROCEDURE — 85025 COMPLETE CBC W/AUTO DIFF WBC: CPT

## 2025-02-28 PROCEDURE — 80061 LIPID PANEL: CPT

## 2025-02-28 PROCEDURE — 83036 HEMOGLOBIN GLYCOSYLATED A1C: CPT

## 2025-02-28 PROCEDURE — 84443 ASSAY THYROID STIM HORMONE: CPT

## 2025-02-28 PROCEDURE — 36415 COLL VENOUS BLD VENIPUNCTURE: CPT

## 2025-03-03 ENCOUNTER — RESULTS FOLLOW-UP (OUTPATIENT)
Dept: FAMILY MEDICINE CLINIC | Facility: HOSPITAL | Age: 70
End: 2025-03-03

## 2025-03-24 ENCOUNTER — OFFICE VISIT (OUTPATIENT)
Dept: FAMILY MEDICINE CLINIC | Facility: HOSPITAL | Age: 70
End: 2025-03-24
Payer: COMMERCIAL

## 2025-03-24 VITALS
BODY MASS INDEX: 31.3 KG/M2 | HEIGHT: 67 IN | OXYGEN SATURATION: 98 % | DIASTOLIC BLOOD PRESSURE: 92 MMHG | WEIGHT: 199.4 LBS | TEMPERATURE: 97.9 F | SYSTOLIC BLOOD PRESSURE: 144 MMHG | HEART RATE: 87 BPM

## 2025-03-24 DIAGNOSIS — R07.89 CHEST TIGHTNESS: ICD-10-CM

## 2025-03-24 DIAGNOSIS — R03.0 ELEVATED BP WITHOUT DIAGNOSIS OF HYPERTENSION: ICD-10-CM

## 2025-03-24 DIAGNOSIS — Z00.00 MEDICARE WELCOME EXAM: Primary | ICD-10-CM

## 2025-03-24 DIAGNOSIS — L98.9 LESION OF SKIN OF NOSE: ICD-10-CM

## 2025-03-24 DIAGNOSIS — R06.09 DYSPNEA ON EXERTION: ICD-10-CM

## 2025-03-24 DIAGNOSIS — F41.9 ANXIETY: ICD-10-CM

## 2025-03-24 PROCEDURE — G0438 PPPS, INITIAL VISIT: HCPCS

## 2025-03-24 RX ORDER — LORAZEPAM 1 MG/1
1 TABLET ORAL 2 TIMES DAILY
Qty: 60 TABLET | Refills: 0 | Status: SHIPPED | OUTPATIENT
Start: 2025-03-24

## 2025-03-24 NOTE — PROGRESS NOTES
Name: Lin Lassiter      : 1955      MRN: [id number]  Encounter Provider: Sravanthi Gonzalez DO  Encounter Date: 3/24/2025   Encounter department: [facility]IMARY [facility] [address]    Assessment & Plan  Medicare welcome exam  -Reports endo manages her DXA scans, pt is due for repeat       Dyspnea on exertion  -x1 month  -Pt would like further evaluation from cardiology, I am agreeable to this plan  Orders:    Ambulatory Referral to Cardiology; Future    Chest tightness    Orders:    Ambulatory Referral to Cardiology; Future    Anxiety    Orders:    LORazepam (ATIVAN) 1 mg tablet; Take 1 tablet (1 mg total) by mouth 2 (two) times a day    Lesion of skin of nose    Orders:    Ambulatory Referral to Dermatology; Future    Elevated BP without diagnosis of hypertension  -F/u 2 weeks with home BP log          Preventive health issues were discussed with patient, and age appropriate screening tests were ordered as noted in patient's After Visit Summary. Personalized health advice and appropriate referrals for health education or preventive services given if needed, as noted in patient's After Visit Summary.    History of Present Illness     HPI   Patient Care Team:  Sravanthi Gonzalez DO as PCP - General (Family Medicine)  Karis Clark MD as PCP - Endocrinology (Endocrinology)  Checo Lee MD (Inactive) as Surgeon (Surgical Oncology)  Rian Palumbo MD (Radiation Oncology)  Chai Trinidad MD as Medical Oncologist (Hematology)  Rizwana Oswald MD (Hematology and Oncology)    Review of Systems   Respiratory:  Positive for chest tightness (with anxiety) and shortness of breath (with exertion).    Gastrointestinal:  Positive for constipation (alternating, with changes in diet) and diarrhea. Negative for abdominal pain, nausea and vomiting.   Skin:  Positive for rash (nose).     Medical History Reviewed by provider this encounter:       Annual Wellness Visit Questionnaire    Lin is here for her Welcome to Medicare visit.     Health Risk Assessment:   Patient rates overall health as good. Patient feels that their physical health rating is same. Patient is very satisfied with their life. Eyesight was rated as slightly worse. Hearing was rated as same. Patient feels that their emotional and mental health rating is same. Patients states they are sometimes angry. Patient states they are sometimes unusually tired/fatigued. Pain experienced in the last 7 days has been none. Patient states that she has experienced no weight loss or gain in last 6 months. Follows with eye doctor    Depression Screening:   PHQ-9 Score: 2      Fall Risk Screening:   In the past year, patient has experienced: no history of falling in past year      Urinary Incontinence Screening:   Patient has leaked urine accidently in the last six months. Stress  Uses a panty liner  Not bothersome to patient    Home Safety:  Patient does not have trouble with stairs inside or outside of their home. Patient has working smoke alarms and has working carbon monoxide detector. Home safety hazards include: none.     Nutrition:   Current diet is Low Carb and Limited junk food. Few years ago  Has cut down since moving    Medications:   Patient is currently taking over-the-counter supplements. OTC medications include: see medication list. Patient is able to manage medications.     Activities of Daily Living (ADLs)/Instrumental Activities of Daily Living (IADLs):   Walk and transfer into and out of bed and chair?: Yes  Dress and groom yourself?: Yes    Bathe or shower yourself?: Yes    Feed yourself? Yes  Do your laundry/housekeeping?: Yes  Manage your money, pay your bills and track your expenses?: Yes  Make your own meals?: Yes    Do your own shopping?: Yes    Previous Hospitalizations:   Any hospitalizations or ED visits within the last 12 months?: No      Advance Care Planning:   Living will: No    Durable POA for healthcare: No     Advanced directive: No      PREVENTIVE SCREENINGS      Cardiovascular Screening:    General: Screening Current      Diabetes Screening:     General: Screening Current      Colorectal Cancer Screening:     General: Screening Current      Breast Cancer Screening:     General: History Breast Cancer and Screening Current      Cervical Cancer Screening:    General: Screening Not Indicated      Osteoporosis Screening:    General: History Osteoporosis    Due for: DXA Axial      Abdominal Aortic Aneurysm (AAA) Screening:        General: Screening Not Indicated      Lung Cancer Screening:     General: Screening Not Indicated      Preventive Screening Comments: Winded with exertion, for the past month  Chest tightness with anxiety  No syncope or visual disturbance  Denies fam hx    Tobacco - denies history    Screening, Brief Intervention, and Referral to Treatment (SBIRT)     Screening  Typical number of drinks in a day: 0  Typical number of drinks in a week: 8  Interpretation: Low risk drinking behavior.    AUDIT-C Screenin) How often did you have a drink containing alcohol in the past year? 2 to 3 times a week  2) How many drinks did you have on a typical day when you were drinking in the past year? 0  3) How often did you have 6 or more drinks on one occasion in the past year? never    AUDIT-C Score: 3  Interpretation: Score 3-12 (female): POSITIVE screen for alcohol misuse    AUDIT Screenin) How often during the last year have you found that you were not able to stop drinking once you had started? 0 - never  5) How often during the last year have you failed to do what was normally expected from you because of drinking? 0 - never  6) How often during the last year have you needed a first drink in the morning to get yourself going after a heavy drinking session? 0 - never  7) How often during the last year have you had a feeling of guilt or remorse after drinking? 0 - never  8) How often during the last year  have you been unable to remember what happened the night before because you had been drinking? 0 - never  9) Have you or someone else been injured as a result of your drinking? 0 - no  10) Has a relative or friend or a doctor or another health worker been concerned about your drinking or suggested you cut down? 2 - yes, but not in the last year    AUDIT Score: 5  Interpretation: Low risk alcohol consumption    Single Item Drug Screening:  How often have you used an illegal drug (including marijuana) or a prescription medication for non-medical reasons in the past year? never    Single Item Drug Screen Score: 0  Interpretation: Negative screen for possible drug use disorder    Other Counseling Topics:   Calcium and vitamin D intake and regular weightbearing exercise. Exercise - walking and playing with dog, yoga 3x/week    Social Drivers of Health     Food Insecurity: No Food Insecurity (3/23/2025)    Hunger Vital Sign     Worried About Running Out of Food in the Last Year: Never true     Ran Out of Food in the Last Year: Never true   Transportation Needs: No Transportation Needs (3/23/2025)    PRAPARE - Transportation     Lack of Transportation (Medical): No     Lack of Transportation (Non-Medical): No   Housing Stability: Low Risk  (3/23/2025)    Housing Stability Vital Sign     Unable to Pay for Housing in the Last Year: No     Number of Times Moved in the Last Year: 0     Homeless in the Last Year: No   Utilities: Not At Risk (3/23/2025)    [facility] Utilities     Threatened with loss of utilities: No     No results found.    Objective   There were no vitals taken for this visit.    Physical Exam  Vitals reviewed.   Constitutional:       General: She is not in acute distress.     Appearance: Normal appearance. She is obese. She is not ill-appearing.   HENT:      Head: Normocephalic and atraumatic.      Right Ear: Tympanic membrane, ear canal and external ear normal. There is no impacted cerumen.      Left Ear: Tympanic  membrane, ear canal and external ear normal. There is no impacted cerumen.      Mouth/Throat:      Mouth: Mucous membranes are moist.      Pharynx: No posterior oropharyngeal erythema.   Cardiovascular:      Rate and Rhythm: Normal rate and regular rhythm.      Heart sounds: Normal heart sounds.   Pulmonary:      Effort: Pulmonary effort is normal.      Breath sounds: Normal breath sounds.   Abdominal:      General: Bowel sounds are normal.      Tenderness: There is no abdominal tenderness. There is no guarding or rebound.   Neurological:      Mental Status: She is alert.   Psychiatric:         Mood and Affect: Mood normal.         Behavior: Behavior normal.         Sravanthi Gonzalez, DO  Family Medicine

## 2025-03-24 NOTE — PATIENT INSTRUCTIONS
Medicare Preventive Visit Patient Instructions  Thank you for completing your Welcome to Medicare Visit or Medicare Annual Wellness Visit today. Your next wellness visit will be due in one year (3/25/2026).  The screening/preventive services that you may require over the next 5-10 years are detailed below. Some tests may not apply to you based off risk factors and/or age. Screening tests ordered at today's visit but not completed yet may show as past due. Also, please note that scanned in results may not display below.  Preventive Screenings:  Service Recommendations Previous Testing/Comments   Colorectal Cancer Screening  * Colonoscopy    * Fecal Occult Blood Test (FOBT)/Fecal Immunochemical Test (FIT)  * Fecal DNA/Cologuard Test  * Flexible Sigmoidoscopy Age: 45-75 years old   Colonoscopy: every 10 years (may be performed more frequently if at higher risk)  OR  FOBT/FIT: every 1 year  OR  Cologuard: every 3 years  OR  Sigmoidoscopy: every 5 years  Screening may be recommended earlier than age 45 if at higher risk for colorectal cancer. Also, an individualized decision between you and your healthcare provider will decide whether screening between the ages of 76-85 would be appropriate. Colonoscopy: 07/15/2024  FOBT/FIT: Not on file  Cologuard: Not on file  Sigmoidoscopy: Not on file    Screening Current     Breast Cancer Screening Age: 40+ years old  Frequency: every 1-2 years  Not required if history of left and right mastectomy Mammogram: 10/14/2024    History Breast Cancer   Cervical Cancer Screening Between the ages of 21-29, pap smear recommended once every 3 years.   Between the ages of 30-65, can perform pap smear with HPV co-testing every 5 years.   Recommendations may differ for women with a history of total hysterectomy, cervical cancer, or abnormal pap smears in past. Pap Smear: Not on file    Screening Not Indicated   Hepatitis C Screening Once for adults born between 1945 and 1965  More frequently in  patients at high risk for Hepatitis C Hep C Antibody: Not on file        Diabetes Screening 1-2 times per year if you're at risk for diabetes or have pre-diabetes Fasting glucose: 90 mg/dL (3/22/2024)  A1C: 6.1 % (2/28/2025)  Screening Current   Cholesterol Screening Once every 5 years if you don't have a lipid disorder. May order more often based on risk factors. Lipid panel: 02/28/2025    Screening Current     Other Preventive Screenings Covered by Medicare:  Abdominal Aortic Aneurysm (AAA) Screening: covered once if your at risk. You're considered to be at risk if you have a family history of AAA.  Lung Cancer Screening: covers low dose CT scan once per year if you meet all of the following conditions: (1) Age 55-77; (2) No signs or symptoms of lung cancer; (3) Current smoker or have quit smoking within the last 15 years; (4) You have a tobacco smoking history of at least 20 pack years (packs per day multiplied by number of years you smoked); (5) You get a written order from a healthcare provider.  Glaucoma Screening: covered annually if you're considered high risk: (1) You have diabetes OR (2) Family history of glaucoma OR (3)  aged 50 and older OR (4)  American aged 65 and older  Osteoporosis Screening: covered every 2 years if you meet one of the following conditions: (1) You're estrogen deficient and at risk for osteoporosis based off medical history and other findings; (2) Have a vertebral abnormality; (3) On glucocorticoid therapy for more than 3 months; (4) Have primary hyperparathyroidism; (5) On osteoporosis medications and need to assess response to drug therapy.   Last bone density test (DXA Scan): 04/12/2023.  HIV Screening: covered annually if you're between the age of 15-65. Also covered annually if you are younger than 15 and older than 65 with risk factors for HIV infection. For pregnant patients, it is covered up to 3 times per pregnancy.    Immunizations:  Immunization  Recommendations   Influenza Vaccine Annual influenza vaccination during flu season is recommended for all persons aged >= 6 months who do not have contraindications   Pneumococcal Vaccine   * Pneumococcal conjugate vaccine = PCV13 (Prevnar 13), PCV15 (Vaxneuvance), PCV20 (Prevnar 20)  * Pneumococcal polysaccharide vaccine = PPSV23 (Pneumovax) Adults 19-63 yo with certain risk factors or if 65+ yo  If never received any pneumonia vaccine: recommend Prevnar 20 (PCV20)  Give PCV20 if previously received 1 dose of PCV13 or PPSV23   Hepatitis B Vaccine 3 dose series if at intermediate or high risk (ex: diabetes, end stage renal disease, liver disease)   Respiratory syncytial virus (RSV) Vaccine - COVERED BY MEDICARE PART D  * RSVPreF3 (Arexvy) CDC recommends that adults 60 years of age and older may receive a single dose of RSV vaccine using shared clinical decision-making (SCDM)   Tetanus (Td) Vaccine - COST NOT COVERED BY MEDICARE PART B Following completion of primary series, a booster dose should be given every 10 years to maintain immunity against tetanus. Td may also be given as tetanus wound prophylaxis.   Tdap Vaccine - COST NOT COVERED BY MEDICARE PART B Recommended at least once for all adults. For pregnant patients, recommended with each pregnancy.   Shingles Vaccine (Shingrix) - COST NOT COVERED BY MEDICARE PART B  2 shot series recommended in those 19 years and older who have or will have weakened immune systems or those 50 years and older     Health Maintenance Due:      Topic Date Due   • Hepatitis C Screening  Never done   • Breast Cancer Screening: Mammogram  10/14/2025   • Colorectal Cancer Screening  07/14/2029     Immunizations Due:      Topic Date Due   • Pneumococcal Vaccine: 65+ Years (1 of 2 - PCV) Never done   • Hepatitis A Vaccine (1 of 2 - Risk 2-dose series) Never done   • Hepatitis B Vaccine (1 of 3 - Risk 3-dose series) Never done   • Influenza Vaccine (1) 09/01/2024   • COVID-19 Vaccine (1  - 2024-25 season) Never done     Advance Directives   What are advance directives?  Advance directives are legal documents that state your wishes and plans for medical care. These plans are made ahead of time in case you lose your ability to make decisions for yourself. Advance directives can apply to any medical decision, such as the treatments you want, and if you want to donate organs.   What are the types of advance directives?  There are many types of advance directives, and each state has rules about how to use them. You may choose a combination of any of the following:  Living will:  This is a written record of the treatment you want. You can also choose which treatments you do not want, which to limit, and which to stop at a certain time. This includes surgery, medicine, IV fluid, and tube feedings.   Durable power of  for healthcare (DPAHC):  This is a written record that states who you want to make healthcare choices for you when you are unable to make them for yourself. This person, called a proxy, is usually a family member or a friend. You may choose more than 1 proxy.  Do not resuscitate (DNR) order:  A DNR order is used in case your heart stops beating or you stop breathing. It is a request not to have certain forms of treatment, such as CPR. A DNR order may be included in other types of advance directives.  Medical directive:  This covers the care that you want if you are in a coma, near death, or unable to make decisions for yourself. You can list the treatments you want for each condition. Treatment may include pain medicine, surgery, blood transfusions, dialysis, IV or tube feedings, and a ventilator (breathing machine).  Values history:  This document has questions about your views, beliefs, and how you feel and think about life. This information can help others choose the care that you would choose.  Why are advance directives important?  An advance directive helps you control your care.  Although spoken wishes may be used, it is better to have your wishes written down. Spoken wishes can be misunderstood, or not followed. Treatments may be given even if you do not want them. An advance directive may make it easier for your family to make difficult choices about your care.   Urinary Incontinence   Urinary incontinence (UI)  is when you lose control of your bladder. UI develops because your bladder cannot store or empty urine properly. The 3 most common types of UI are stress incontinence, urge incontinence, or both.  Medicines:   May be given to help strengthen your bladder control. Report any side effects of medication to your healthcare provider.  Do pelvic muscle exercises often:  Your pelvic muscles help you stop urinating. Squeeze these muscles tight for 5 seconds, then relax for 5 seconds. Gradually work up to squeezing for 10 seconds. Do 3 sets of 15 repetitions a day, or as directed. This will help strengthen your pelvic muscles and improve bladder control.  Train your bladder:  Go to the bathroom at set times, such as every 2 hours, even if you do not feel the urge to go. You can also try to hold your urine when you feel the urge to go. For example, hold your urine for 5 minutes when you feel the urge to go. As that becomes easier, hold your urine for 10 minutes.   Self-care:   Keep a UI record.  Write down how often you leak urine and how much you leak. Make a note of what you were doing when you leaked urine.  Drink liquids as directed. You may need to limit the amount of liquid you drink to help control your urine leakage. Do not drink any liquid right before you go to bed. Limit or do not have drinks that contain caffeine or alcohol.   Prevent constipation.  Eat a variety of high-fiber foods. Good examples are high-fiber cereals, beans, vegetables, and whole-grain breads. Walking is the best way to trigger your intestines to have a bowel movement.  Exercise regularly and maintain a  healthy weight.  Weight loss and exercise will decrease pressure on your bladder and help you control your leakage.   Use a catheter as directed  to help empty your bladder. A catheter is a tiny, plastic tube that is put into your bladder to drain your urine.   Go to behavior therapy as directed.  Behavior therapy may be used to help you learn to control your urge to urinate.    Weight Management   Why it is important to manage your weight:  Being overweight increases your risk of health conditions such as heart disease, high blood pressure, type 2 diabetes, and certain types of cancer. It can also increase your risk for osteoarthritis, sleep apnea, and other respiratory problems. Aim for a slow, steady weight loss. Even a small amount of weight loss can lower your risk of health problems.  How to lose weight safely:  A safe and healthy way to lose weight is to eat fewer calories and get regular exercise. You can lose up about 1 pound a week by decreasing the number of calories you eat by 500 calories each day.   Healthy meal plan for weight management:  A healthy meal plan includes a variety of foods, contains fewer calories, and helps you stay healthy. A healthy meal plan includes the following:  Eat whole-grain foods more often.  A healthy meal plan should contain fiber. Fiber is the part of grains, fruits, and vegetables that is not broken down by your body. Whole-grain foods are healthy and provide extra fiber in your diet. Some examples of whole-grain foods are whole-wheat breads and pastas, oatmeal, brown rice, and bulgur.  Eat a variety of vegetables every day.  Include dark, leafy greens such as spinach, kale, christi greens, and mustard greens. Eat yellow and orange vegetables such as carrots, sweet potatoes, and winter squash.   Eat a variety of fruits every day.  Choose fresh or canned fruit (canned in its own juice or light syrup) instead of juice. Fruit juice has very little or no fiber.  Eat low-fat  "dairy foods.  Drink fat-free (skim) milk or 1% milk. Eat fat-free yogurt and low-fat cottage cheese. Try low-fat cheeses such as mozzarella and other reduced-fat cheeses.  Choose meat and other protein foods that are low in fat.  Choose beans or other legumes such as split peas or lentils. Choose fish, skinless poultry (chicken or turkey), or lean cuts of red meat (beef or pork). Before you cook meat or poultry, cut off any visible fat.   Use less fat and oil.  Try baking foods instead of frying them. Add less fat, such as margarine, sour cream, regular salad dressing and mayonnaise to foods. Eat fewer high-fat foods. Some examples of high-fat foods include french fries, doughnuts, ice cream, and cakes.  Eat fewer sweets.  Limit foods and drinks that are high in sugar. This includes candy, cookies, regular soda, and sweetened drinks.  Exercise:  Exercise at least 30 minutes per day on most days of the week. Some examples of exercise include walking, biking, dancing, and swimming. You can also fit in more physical activity by taking the stairs instead of the elevator or parking farther away from stores. Ask your healthcare provider about the best exercise plan for you.   Alcohol Use and Your Health    Drinking too much can harm your health.  Excessive alcohol use leads to about 88,000 death in the [place] each year, and shortens the life of those who diet by almost 30 years.  Further, excessive drinking cost the economy $249 billion in 2010.  Most excessive drinkers are not alcohol dependent.    Excessive alcohol use has immediate effects that increase the risk of many harmful health conditions.  These are most often the result of binge drinking.  Over time, excessive alcohol use can lead to the development of chronic diseases and other series health problems.    What is considered a \"drink\"?        Excessive alcohol use includes:  Binge Drinking: For women, 4 or more drinks consumed on one occasion. For men, 5 " or more drinks consumed on one occasion.  Heavy Drinking: For women, 8 or more drinks per week. For men, 15 or more drinks per week  Any alcohol used by pregnant women  Any alcohol used by those under the age of 21 years    If you choose to drink, do so in moderation:  Do not drink at all if you are under the age of 21, or if you are or may be pregnant, or have health problems that could be made worse by drinking.  For women, up to 1 drink per day  For men, up to 2 drinks a day    No one should begin drinking or drink more frequently based on potential health benefits    Short-Term Health Risks:  Injuries: motor vehicle crashes, falls, drownings, burns  Violence: homicide, suicide, sexual assault, intimate partner violence  Alcohol poisoning  Reproductive health: risky sexual behaviors, unintended prengnacy, sexually transmitted diseases, miscarriage, stillbirth, fetal alcohol syndrome    Long-Term Health Risks:  Chronic diseases: high blood pressure, heart disease, stroke, liver disease, digestive problems  Cancers: breast, mouth and throat, liver, colon  Learning and memory problems: dementia, poor school performance  Mental health: depression, anxiety, insomnia  Social problems: lost productivity, family problems, unemployment  Alcohol dependence    For support and more information:  Substance Abuse and Mental Health Services Administration  [address]  [place] [address]  Web Address: http://www.samhsa.gov    Alcoholics Anonymous        Web Address: http://www.aa.org    https://www.cdc.gov/alcohol/fact-sheets/alcohol-use.htm     © Copyright [employer] 2018 Information is for End User's use only and may not be sold, redistributed or otherwise used for commercial purposes. All illustrations and images included in CareNotes® are the copyrighted property of [employer] or [employer]

## 2025-04-02 ENCOUNTER — TELEPHONE (OUTPATIENT)
Age: 70
End: 2025-04-02

## 2025-04-02 NOTE — TELEPHONE ENCOUNTER
Received call from Patient for CONSULT PG -  Skin Check - Lesion of skin of nose. Scheduled 1/22/26 2:20 pm [facility] [place] [facility]. Verified insurance, provided [place] addr.     Patient verbalized understanding.

## 2025-04-04 DIAGNOSIS — J32.9 CHRONIC SINUSITIS, UNSPECIFIED LOCATION: ICD-10-CM

## 2025-04-04 RX ORDER — FLUTICASONE PROPIONATE 50 MCG
1 SPRAY, SUSPENSION (ML) NASAL DAILY
Qty: 16 G | Refills: 1 | Status: SHIPPED | OUTPATIENT
Start: 2025-04-04

## 2025-04-10 ENCOUNTER — OFFICE VISIT (OUTPATIENT)
Dept: FAMILY MEDICINE CLINIC | Facility: HOSPITAL | Age: 70
End: 2025-04-10
Payer: COMMERCIAL

## 2025-04-10 VITALS
OXYGEN SATURATION: 97 % | WEIGHT: 199.8 LBS | TEMPERATURE: 98.8 F | BODY MASS INDEX: 31.36 KG/M2 | DIASTOLIC BLOOD PRESSURE: 90 MMHG | HEART RATE: 102 BPM | HEIGHT: 67 IN | SYSTOLIC BLOOD PRESSURE: 146 MMHG

## 2025-04-10 DIAGNOSIS — R09.81 SINUS CONGESTION: Primary | ICD-10-CM

## 2025-04-10 DIAGNOSIS — J32.9 RECURRENT SINUS INFECTIONS: ICD-10-CM

## 2025-04-10 DIAGNOSIS — R03.0 ELEVATED BP WITHOUT DIAGNOSIS OF HYPERTENSION: ICD-10-CM

## 2025-04-10 DIAGNOSIS — G47.00 INSOMNIA, UNSPECIFIED TYPE: ICD-10-CM

## 2025-04-10 PROCEDURE — 99213 OFFICE O/P EST LOW 20 MIN: CPT

## 2025-04-10 PROCEDURE — G2211 COMPLEX E/M VISIT ADD ON: HCPCS

## 2025-04-10 RX ORDER — ZOLPIDEM TARTRATE 10 MG/1
10 TABLET ORAL
Qty: 30 TABLET | Refills: 0 | Status: SHIPPED | OUTPATIENT
Start: 2025-04-12

## 2025-04-10 RX ORDER — CEFDINIR 300 MG/1
300 CAPSULE ORAL EVERY 12 HOURS SCHEDULED
Qty: 14 CAPSULE | Refills: 0 | Status: SHIPPED | OUTPATIENT
Start: 2025-04-10 | End: 2025-04-17

## 2025-04-10 NOTE — PROGRESS NOTES
"Name: Lin Lassiter      : 1955      MRN: [id number]  Encounter Provider: Sravanthi Gonzalez DO  Encounter Date: 4/10/2025   Encounter department: [facility] CARE [address]  :  Assessment & Plan  Sinus congestion  Trial of antibiotics cefdinir X 7 days, pharmacy confirmed  Also gave patient samples of nasal irrigation bottles  Continue with Flonase, Zyrtec  Will order CT sinuses given recurrent sinus infections with no relief    Orders:    cefdinir (OMNICEF) 300 mg capsule; Take 1 capsule (300 mg total) by mouth every 12 (twelve) hours for 7 days    Recurrent sinus infections    Orders:    CT sinus wo contrast; Future    Insomnia, unspecified type  - Will provide refill of Ambien, PDMP reviewed  Orders:    zolpidem (AMBIEN) 10 mg tablet; Take 1 tablet (10 mg total) by mouth daily at bedtime   Do not start before 2025.    Elevated BP without diagnosis of hypertension  Home blood pressures average less than 140/90, we will continue to monitor              History of Present Illness   F/u BP  -At home averages are <140s systolics    F/u insomnia  -Requests refill of Ambien  -Denies side effects, tolerating well    F/u allergies  - Taking flonase & zyrtec  - Is frustrated that no allergist in network is available  - Sinus pressure, congestion. About a fever  - Denies fever, sick contacts, +cough, -sore throat        Review of Systems   Constitutional:  Negative for chills and fever.   HENT:  Positive for congestion, sinus pressure and sinus pain. Negative for sore throat and trouble swallowing.    Respiratory:  Positive for cough.    Psychiatric/Behavioral:  Positive for sleep disturbance.        Objective   /90   Pulse 102   Temp 98.8 °F (37.1 °C) (Tympanic)   Ht 5' 7\" (1.702 m)   Wt 90.6 kg (199 lb 12.8 oz)   SpO2 97%   BMI 31.29 kg/m²      Physical Exam  Vitals reviewed.   Constitutional:       General: She is not in acute distress.     Appearance: Normal " appearance. She is obese. She is not ill-appearing.   HENT:      Head: Normocephalic and atraumatic.      Right Ear: Tympanic membrane, ear canal and external ear normal. There is no impacted cerumen.      Left Ear: Tympanic membrane, ear canal and external ear normal. There is no impacted cerumen.      Nose: Nose normal.   Cardiovascular:      Rate and Rhythm: Normal rate and regular rhythm.      Heart sounds: Normal heart sounds.   Pulmonary:      Effort: Pulmonary effort is normal.      Breath sounds: Normal breath sounds.   Neurological:      Mental Status: She is alert.   Psychiatric:         Behavior: Behavior normal.      Comments: Pt intermittently anxious         Sravanthi Gonzalez, DO  Family Medicine

## 2025-04-14 ENCOUNTER — TELEPHONE (OUTPATIENT)
Age: 70
End: 2025-04-14

## 2025-04-14 DIAGNOSIS — M81.0 OSTEOPOROSIS, UNSPECIFIED OSTEOPOROSIS TYPE, UNSPECIFIED PATHOLOGICAL FRACTURE PRESENCE: Primary | ICD-10-CM

## 2025-04-14 NOTE — TELEPHONE ENCOUNTER
Patient calling in stating she needs to have DXA bone density spine hip and pelvis completed prior to  appointment. Per central scheduling when attempting to schedule, a new script for scan is needed- stating current order in [facility] was ordered 2023, and is . Patient asking to please enter order for DXA bone density spine hip and pelvis asap so she can have it completed prior to f/u appointment. She is asking to please send [employer] message notifying her once new order is entered so she can call to schedule. Please advise, thank you.

## 2025-04-24 ENCOUNTER — HOSPITAL ENCOUNTER (OUTPATIENT)
Dept: BONE DENSITY | Facility: IMAGING CENTER | Age: 70
Discharge: HOME | End: 2025-04-24
Attending: STUDENT IN AN ORGANIZED HEALTH CARE EDUCATION/TRAINING PROGRAM
Payer: COMMERCIAL

## 2025-04-24 VITALS — HEIGHT: 68 IN | WEIGHT: 197.6 LBS | BODY MASS INDEX: 29.95 KG/M2

## 2025-04-24 DIAGNOSIS — M81.0 OSTEOPOROSIS, UNSPECIFIED OSTEOPOROSIS TYPE, UNSPECIFIED PATHOLOGICAL FRACTURE PRESENCE: ICD-10-CM

## 2025-04-24 PROCEDURE — 77080 DXA BONE DENSITY AXIAL: CPT

## 2025-04-25 ENCOUNTER — APPOINTMENT (OUTPATIENT)
Dept: LAB | Facility: HOSPITAL | Age: 70
End: 2025-04-25
Payer: COMMERCIAL

## 2025-04-25 ENCOUNTER — RESULTS FOLLOW-UP (OUTPATIENT)
Dept: OTHER | Facility: HOSPITAL | Age: 70
End: 2025-04-25

## 2025-04-25 DIAGNOSIS — M81.0 OSTEOPOROSIS, UNSPECIFIED OSTEOPOROSIS TYPE, UNSPECIFIED PATHOLOGICAL FRACTURE PRESENCE: ICD-10-CM

## 2025-04-25 LAB
25(OH)D3 SERPL-MCNC: 34.2 NG/ML (ref 30–100)
ALBUMIN SERPL BCG-MCNC: 4.2 G/DL (ref 3.5–5)
ALP SERPL-CCNC: 97 U/L (ref 34–104)
ALT SERPL W P-5'-P-CCNC: 15 U/L (ref 7–52)
ANION GAP SERPL CALCULATED.3IONS-SCNC: 10 MMOL/L (ref 4–13)
AST SERPL W P-5'-P-CCNC: 16 U/L (ref 13–39)
BILIRUB SERPL-MCNC: 0.73 MG/DL (ref 0.2–1)
BUN SERPL-MCNC: 12 MG/DL (ref 5–25)
CALCIUM SERPL-MCNC: 9.1 MG/DL (ref 8.4–10.2)
CHLORIDE SERPL-SCNC: 99 MMOL/L (ref 96–108)
CO2 SERPL-SCNC: 26 MMOL/L (ref 21–32)
CREAT SERPL-MCNC: 0.59 MG/DL (ref 0.6–1.3)
GFR SERPL CREATININE-BSD FRML MDRD: 93 ML/MIN/1.73SQ M
GLUCOSE P FAST SERPL-MCNC: 95 MG/DL (ref 65–99)
POTASSIUM SERPL-SCNC: 3.7 MMOL/L (ref 3.5–5.3)
PROT SERPL-MCNC: 7.7 G/DL (ref 6.4–8.4)
PTH-INTACT SERPL-MCNC: 50.4 PG/ML (ref 12–88)
SODIUM SERPL-SCNC: 135 MMOL/L (ref 135–147)

## 2025-04-25 PROCEDURE — 36415 COLL VENOUS BLD VENIPUNCTURE: CPT

## 2025-04-25 PROCEDURE — 80053 COMPREHEN METABOLIC PANEL: CPT

## 2025-04-25 PROCEDURE — 82306 VITAMIN D 25 HYDROXY: CPT

## 2025-04-25 PROCEDURE — 83970 ASSAY OF PARATHORMONE: CPT

## 2025-04-29 ENCOUNTER — RESULTS FOLLOW-UP (OUTPATIENT)
Dept: ENDOCRINOLOGY | Facility: HOSPITAL | Age: 70
End: 2025-04-29

## 2025-05-07 DIAGNOSIS — Z17.0 MALIGNANT NEOPLASM OF UPPER-OUTER QUADRANT OF RIGHT BREAST IN FEMALE, ESTROGEN RECEPTOR POSITIVE (HCC): ICD-10-CM

## 2025-05-07 DIAGNOSIS — C50.411 MALIGNANT NEOPLASM OF UPPER-OUTER QUADRANT OF RIGHT BREAST IN FEMALE, ESTROGEN RECEPTOR POSITIVE (HCC): ICD-10-CM

## 2025-05-12 RX ORDER — ANASTROZOLE 1 MG/1
1 TABLET ORAL DAILY
Qty: 90 TABLET | Refills: 1 | Status: SHIPPED | OUTPATIENT
Start: 2025-05-12

## 2025-05-16 DIAGNOSIS — G47.00 INSOMNIA, UNSPECIFIED TYPE: ICD-10-CM

## 2025-05-16 RX ORDER — ZOLPIDEM TARTRATE 10 MG/1
10 TABLET ORAL
Qty: 30 TABLET | Refills: 0 | Status: SHIPPED | OUTPATIENT
Start: 2025-05-16

## 2025-05-16 NOTE — TELEPHONE ENCOUNTER
Reason for call:   [ ] Refill   [ ] Prior Auth  [ ] Other:     Office:   [ ] PCP/Provider - [facility] [address]  Ctr pod     Medication: zolpidem (AMBIEN) 10 mg tablet    Dose/Frequency: Take 1 tablet (10 mg total) by mouth daily at bedtime   Do not start before April 12, 2025.    Quantity: 30    Pharmacy: [id number] - [place] - [id number] [facility]   Does the patient have enough for 3 days?   [ ] Yes   [ ] No - Send as HP to POD

## 2025-06-03 ENCOUNTER — CONSULT (OUTPATIENT)
Dept: CARDIOLOGY CLINIC | Facility: CLINIC | Age: 70
End: 2025-06-03
Payer: COMMERCIAL

## 2025-06-03 VITALS
WEIGHT: 201 LBS | BODY MASS INDEX: 31.55 KG/M2 | DIASTOLIC BLOOD PRESSURE: 94 MMHG | HEIGHT: 67 IN | SYSTOLIC BLOOD PRESSURE: 142 MMHG | HEART RATE: 91 BPM

## 2025-06-03 DIAGNOSIS — R06.02 SHORTNESS OF BREATH: ICD-10-CM

## 2025-06-03 DIAGNOSIS — R07.89 CHEST PRESSURE: Primary | ICD-10-CM

## 2025-06-03 DIAGNOSIS — R73.03 PREDIABETES: ICD-10-CM

## 2025-06-03 PROCEDURE — 99204 OFFICE O/P NEW MOD 45 MIN: CPT | Performed by: INTERNAL MEDICINE

## 2025-06-03 PROCEDURE — 93000 ELECTROCARDIOGRAM COMPLETE: CPT | Performed by: INTERNAL MEDICINE

## 2025-06-03 NOTE — PROGRESS NOTES
Cardiology Consultation     Lin Lassiter  [id number]  1955  [facility] [place] [address] [address]  [address] [id number] [phone]    1. Chest pressure  POCT ECG    Echo complete w/ contrast if indicated    Echo stress test, exercise      2. Shortness of breath  Echo complete w/ contrast if indicated    Echo stress test, exercise      3. Prediabetes            Discussion/Summary:    Lin has had some concerning symptoms over the last 3 months prompting her to consult with our office.  She has both some chest tightness/pressure and shortness of breath which are mutually exclusive.  Her shortness of breath is with exertion, particularly going up stairs.  Her chest pressure/tightness has been more random and not associated with exertion.  We ordered an ischemic evaluation in her.  She will go through a stress echocardiogram and full transthoracic echocardiogram in the near future.  No changes were made to her medical therapy.  If her testing is unremarkable she only needs to see us back as needed and would suggest continued close follow-up with her PCP.    HPI:    Mrs Lassiter comes in for a consultation regarding some concerning symptoms of chest pressure and shortness of breath that she has been experiencing over the last 3 months.  Lin is a 69-year-old female with no prior cardiac history.  Her only risk factors appear to be prediabetes and elevated blood pressures, but she has not been diagnosed with hypertension.  She denies any significant family history and has never been a smoker.    Over the last 3 months Lin has noted some shortness of breath with exertion particularly if she goes up a flight of steps or tries to talk while exerting herself.  She still is active and can do her activities of daily living without any issues, but she has just noticed this symptom.  No shortness of breath at  rest or any other signs or symptoms of CHF.  Mutually exclusive from this she has had some chest pressure/tightness that will occur randomly at rest.  This does not occur with exertion.  The pain is substernal without radiation and without accompanying symptoms.      Problem List[1]  Past Medical History[2]  Social History     Socioeconomic History    Marital status: /Civil Union     Spouse name: Not on file    Number of children: Not on file    Years of education: Not on file    Highest education level: Not on file   Occupational History    Not on file   Tobacco Use    Smoking status: Never    Smokeless tobacco: Never   Vaping Use    Vaping status: Never Used   Substance and Sexual Activity    Alcohol use: Yes     Alcohol/week: 8.0 standard drinks of alcohol     Types: 8 Glasses of wine per week     Comment: socially    Drug use: Never    Sexual activity: Not Currently   Other Topics Concern    Not on file   Social History Narrative    Not on file     Social Drivers of Health     Financial Resource Strain: Not on file   Food Insecurity: No Food Insecurity (3/23/2025)    Nursing - Inadequate Food Risk Classification     Worried About Running Out of Food in the Last Year: Never true     Ran Out of Food in the Last Year: Never true     Ran Out of Food in the Last Year: Not on file   Transportation Needs: No Transportation Needs (3/23/2025)    PRAPARE - Transportation     Lack of Transportation (Medical): No     Lack of Transportation (Non-Medical): No   Physical Activity: Not on file   Stress: Not on file   Social Connections: Not on file   Intimate Partner Violence: Not on file   Housing Stability: Low Risk  (3/23/2025)    Housing Stability Vital Sign     Unable to Pay for Housing in the Last Year: No     Number of Times Moved in the Last Year: 0     Homeless in the Last Year: No      Family History[3]  Past Surgical History[4]  Current Medications[5]  Allergies   Allergen Reactions    Aspirin GI Intolerance  "    Vitals:    06/03/25 1101   BP: 142/94   BP Location: Left arm   Patient Position: Sitting   Cuff Size: Standard   Pulse: 91   Weight: 91.2 kg (201 lb)   Height: 5' 7\" (1.702 m)       Labs:  Lab Results   Component Value Date    K 3.7 04/25/2025    CL 99 04/25/2025    CO2 26 04/25/2025    BUN 12 04/25/2025    CREATININE 0.59 (L) 04/25/2025    CALCIUM 9.1 04/25/2025     Lab Results   Component Value Date    WBC 8.22 02/28/2025    HGB 14.2 02/28/2025    HCT 42.6 02/28/2025    MCV 90 02/28/2025     02/28/2025     Lab Results   Component Value Date    TRIG 98 02/28/2025    HDL 75 02/28/2025     Imaging:  ECG today shows normal sinus rhythm with nonspecific ST changes, otherwise normal ECG.    Review of Systems:  Review of Systems   Constitutional: Negative.    HENT: Negative.     Eyes: Negative.    Respiratory:  Positive for chest tightness and shortness of breath.    Cardiovascular: Negative.    Gastrointestinal: Negative.    Musculoskeletal: Negative.    Skin: Negative.    Allergic/Immunologic: Negative.    Neurological: Negative.    Hematological: Negative.    Psychiatric/Behavioral: Negative.     All other systems reviewed and are negative.    Vitals:    06/03/25 1101   BP: 142/94   BP Location: Left arm   Patient Position: Sitting   Cuff Size: Standard   Pulse: 91   Weight: 91.2 kg (201 lb)   Height: 5' 7\" (1.702 m)     Physical Exam  Vitals and nursing note reviewed.   Constitutional:       Appearance: She is well-developed.   HENT:      Head: Normocephalic and atraumatic.     Eyes:      General: No scleral icterus.        Right eye: No discharge.         Left eye: No discharge.      Pupils: Pupils are equal, round, and reactive to light.     Neck:      Thyroid: No thyromegaly.      Vascular: No JVD.     Cardiovascular:      Rate and Rhythm: Normal rate and regular rhythm. No extrasystoles are present.     Pulses: Normal pulses. No decreased pulses.      Heart sounds: Normal heart sounds, S1 normal and " S2 normal. No murmur heard.     No friction rub. No gallop.   Pulmonary:      Effort: Pulmonary effort is normal. No respiratory distress.      Breath sounds: Normal breath sounds. No wheezing, rhonchi or rales.   Abdominal:      General: Bowel sounds are normal. There is no distension.      Palpations: Abdomen is soft.      Tenderness: There is no abdominal tenderness.     Musculoskeletal:         General: No tenderness or deformity. Normal range of motion.      Cervical back: Normal range of motion and neck supple.      Right lower leg: No edema.      Left lower leg: No edema.     Skin:     General: Skin is warm and dry.      Findings: No rash.     Neurological:      Mental Status: She is alert and oriented to person, place, and time.      Cranial Nerves: No cranial nerve deficit.     Psychiatric:         Thought Content: Thought content normal.         Judgment: Judgment normal.       Counseling / Coordination of Care  Total office time spent today 40 minutes.  Greater than 50% of total time was spent with the patient and / or family counseling and / or coordination of care.         [1]   Patient Active Problem List  Diagnosis    Personal history of colonic polyps    Gastroesophageal reflux disease without esophagitis    Primary biliary cirrhosis (HCC)    Morales's esophagus without dysplasia    Malignant neoplasm of upper-outer quadrant of right breast in female, estrogen receptor positive (HCC)    Use of anastrozole (Arimidex)    COVID-19    Anxiety    Depression    Fracture of distal radius and ulna, left, open type I or II, initial encounter    Hypokalemia    Osteoporosis   [2]   Past Medical History:  Diagnosis Date    Allergic     Anxiety     Morales esophagus     BRCA1 negative     BRCA2 negative     Breast cancer (HCC) 09/24/2020    right mucinous Ca    Cancer (HCC) 9/20    Breast    Colon polyp     Gastroesophageal reflux disease without esophagitis 05/14/2019    GERD (gastroesophageal reflux disease)      Headache(784.0)     History of gallstones     Hypertension     Personal history of colonic polyps 05/14/2019    Primary biliary cirrhosis (HCC) 05/14/2019    Seasonal allergies     Sleep apnea     does not wear cpap   [3]   Family History  Problem Relation Name Age of Onset    No Known Problems Mother      Lung cancer Father  50    No Known Problems Sister      No Known Problems Sister      No Known Problems Brother      Anxiety disorder Maternal Grandmother Venessa     No Known Problems Maternal Grandfather      No Known Problems Paternal Grandmother      No Known Problems Paternal Grandfather      No Known Problems Maternal Aunt      No Known Problems Paternal Aunt      No Known Problems Paternal Aunt      No Known Problems Daughter      Colon cancer Neg Hx      Colon polyps Neg Hx     [4]   Past Surgical History:  Procedure Laterality Date    APPENDECTOMY      BREAST BIOPSY Right 09/24/2020    stereo-  mucinous ca    BREAST BIOPSY Left 10/06/2020    us bx- neg    BREAST LUMPECTOMY Right 12/22/2020    Procedure: BREAST NEEDLE LOCALIZED LUMPECTOMY (NEEDLE LOC AT 1230);  Surgeon: Checo Lee MD;  [facility];  Service: Surgical Oncology    COLONOSCOPY  06/24/2020     2 adenomas.  Five year recall advised    INTRAOPERATIVE RADIATION THERAPY (IORT) Right 12/22/2020    Procedure: BREAST INTRAOPERATIVE RADIATION THERAPY (IORT) BY DR SOTO;  Surgeon: Checo Lee MD;  [facility];  Service: Surgical Oncology    LYMPH NODE BIOPSY Right 12/22/2020    Procedure: SENTINEL LYMPH NODE BIOPSY; LYMPHATIC MAPPING WITH BLUE DYE AND RADIOACTIVE DYE (INJECT AT 1400 BY DR LEE IN THE OR);  Surgeon: Checo Lee MD;  [facility];  Service: Surgical Oncology    MAMMO NEEDLE LOCALIZATION RIGHT (ALL INC) Right 12/22/2020    MAMMO STEREOTACTIC BREAST BIOPSY RIGHT (ALL INC) Right 09/24/2020    MAMMO STEREOTACTIC BREAST BIOPSY RIGHT (ALL INC) Right 11/20/2023    ORIF WRIST FRACTURE Left 2/24/2023    Procedure: OPEN  REDUCTION W/ INTERNAL FIXATION (ORIF) RADIUS / ULNA (WRIST), irrigation;  Surgeon: Campbell Eugene MD;  Location:  MAIN OR;  Service: Orthopedics    TUBAL LIGATION      UPPER GASTROINTESTINAL ENDOSCOPY  06/24/2019    irregular z-line.  biopsies negative for Morales's   [5]   Current Outpatient Medications:     alendronate (FOSAMAX) 70 mg tablet, take 1 tablet by mouth every week, Disp: 12 tablet, Rfl: 1    anastrozole (ARIMIDEX) 1 mg tablet, Take 1 tablet (1 mg total) by mouth daily, Disp: 90 tablet, Rfl: 1    Calcium Carbonate-Vitamin D (Calcium 500 + D) 500-125 MG-UNIT TABS, Take by mouth in the morning., Disp: , Rfl:     cetirizine-pseudoephedrine (ZyrTEC-D) 5-120 MG per tablet, Take 1 tablet by mouth in the morning., Disp: , Rfl:     CRANBERRY EXTRACT PO, Take by mouth, Disp: , Rfl:     fluticasone (FLONASE) 50 mcg/act nasal spray, instill 1 spray into each nostril once daily, Disp: 16 g, Rfl: 1    LORazepam (ATIVAN) 1 mg tablet, Take 1 tablet (1 mg total) by mouth 2 (two) times a day, Disp: 60 tablet, Rfl: 0    Multiple Vitamins-Minerals (MULTIVITAMIN WOMEN 50+ PO), Take by mouth in the morning., Disp: , Rfl:     pantoprazole (PROTONIX) 40 mg tablet, take 1 tablet by mouth twice a day, Disp: 180 tablet, Rfl: 1    sertraline (ZOLOFT) 100 mg tablet, Take 100 mg by mouth in the morning., Disp: , Rfl:     Turmeric Curcumin 500 MG CAPS, Take by mouth in the morning., Disp: , Rfl:     ursodiol (ACTIGALL) 500 MG tablet, Take 2 tablets (1,000 mg total) by mouth 2 (two) times a day, Disp: 120 tablet, Rfl: 5    zolpidem (AMBIEN) 10 mg tablet, Take 1 tablet (10 mg total) by mouth daily at bedtime, Disp: 30 tablet, Rfl: 0

## 2025-06-12 ENCOUNTER — OFFICE VISIT (OUTPATIENT)
Dept: ENDOCRINOLOGY | Facility: HOSPITAL | Age: 70
End: 2025-06-12
Payer: COMMERCIAL

## 2025-06-12 VITALS
DIASTOLIC BLOOD PRESSURE: 70 MMHG | WEIGHT: 201 LBS | HEART RATE: 97 BPM | OXYGEN SATURATION: 100 % | SYSTOLIC BLOOD PRESSURE: 120 MMHG | BODY MASS INDEX: 31.55 KG/M2 | HEIGHT: 67 IN

## 2025-06-12 DIAGNOSIS — S52.502B FRACTURE OF DISTAL RADIUS AND ULNA, LEFT, OPEN TYPE I OR II, INITIAL ENCOUNTER: ICD-10-CM

## 2025-06-12 DIAGNOSIS — M81.0 AGE-RELATED OSTEOPOROSIS WITHOUT CURRENT PATHOLOGICAL FRACTURE: Primary | ICD-10-CM

## 2025-06-12 DIAGNOSIS — S52.602B FRACTURE OF DISTAL RADIUS AND ULNA, LEFT, OPEN TYPE I OR II, INITIAL ENCOUNTER: ICD-10-CM

## 2025-06-12 PROCEDURE — 99214 OFFICE O/P EST MOD 30 MIN: CPT | Performed by: STUDENT IN AN ORGANIZED HEALTH CARE EDUCATION/TRAINING PROGRAM

## 2025-06-12 PROCEDURE — G2211 COMPLEX E/M VISIT ADD ON: HCPCS | Performed by: STUDENT IN AN ORGANIZED HEALTH CARE EDUCATION/TRAINING PROGRAM

## 2025-06-12 NOTE — PROGRESS NOTES
Name: Lin Lassiter      : 1955      MRN: [id number]  Encounter Provider: Karis Clark MD  Encounter Date: 2025   [facility] [place] [facility]    No chief complaint on file.  :  Assessment & Plan    Osteoporosis- age related with Hx of fracture of left wrist after a fall from standing height. Patient also has other Pmhx of several other fractures few years ago, Most recent fracture  of left wrist. Risk factor for osteoporosis includes PPI use, Female, Age, Post menopausal and Estrogen blocker use.  Started on with oral bisphosphonate Fosamax 70mg once a week , tolerating it ok. Recent DXA however showed no change in BMD, although this hasn't decline but also hasn't improved and hence would recommend escalation of tx. Discussed could either consider reclast vs prolia. Discussed annual infusion vs shot every 6 months. She would like to to start prolia.   Repeat DXA in 2 years  Labs every 6 months before prolia. Current labs are ok for prolia. Will send for PA     RTC in 1 year     Pertinent Medical History     History of Present Illness     Lin Lassiter is a 69 y.o. female with osteoporosis  after a recent left radius/Ulnar fracture after a mechanical fall from standing height and using hand for support. DXA scan after surgery showed T score of -2.7 at the forearm w -2.2 at AP spine, -1.6 at left hip and -1.7 at total hip now on fosamax.      HPI:   Onset:-   History of fragility fractures- Yes left radius/ulnar fracture- fell from standing height and used arm as support- . Chased her puppy with stick, fell down while chasing dog.   Other fractures- 35yr ago broke right arm- whacked arm on molding of doorway. Conservative managed  Left wrist- fell on ice 13 years ago   Right wrist- tripped on coffee table- 10 years ago.      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   Rheumatoid arthritis- No   Glucocorticoid use- No  Smoking history- No  Alcohol use- Drinks 2-3 bottles of wine a week  PPI use- Pantoprozole 40mg BID- been on this for 10 years  Estrogen blocker- Does take Anastrozole for Breast cancer- 2 more years left.   Antiseizure medication- None   Eating disorder- None  Dietary Calcium intake- Doesn't drink much milk, eats cheese, ice cream and leaf greens- daily.   Calcium/Vitamin D supplementation:- takes MV- 200mg and calcium supplement 600mg 1 tablet each, 1000IU VitD through MV.   Weight bearing exercises History of HRT use- Doing palates for OP  Falls- did trip today and fell on her side after getting her legs stuck in pants. Did not fracture anything. Denies any height loss     Medications used in past:   Fosamax      Last DXA Scan:-   No significant change in BMD at Forearm.     Review of Systems   Constitutional:  Negative for diaphoresis, fatigue and unexpected weight change.   Eyes:  Negative for photophobia and visual disturbance.   Gastrointestinal:  Negative for constipation, diarrhea and vomiting.   Endocrine: Negative for polydipsia and polyuria.   Skin: Negative.     as per HPI       Medical History Reviewed by provider this encounter:     .    Objective   There were no vitals taken for this visit.     There is no height or weight on file to calculate BMI.  Wt Readings from Last 3 Encounters:   25 91.2 kg (201 lb)   25 89.6 kg (197 lb 9.6 oz)   04/10/25 90.6 kg (199 lb 12.8 oz)     Physical Exam  Constitutional:       Appearance: Normal appearance. She is obese.     Cardiovascular:      Rate and Rhythm: Normal rate.   Pulmonary:      Effort: Pulmonary effort is normal.   Abdominal:      General: Bowel sounds are normal.      Palpations: Abdomen is soft.     Skin:     General: Skin is warm.      Capillary Refill: Capillary refill takes less than 2 seconds.     Neurological:      General: No focal deficit  "present.      Mental Status: She is alert.     Psychiatric:         Mood and Affect: Mood normal.         Labs: I have reviewed pertinent labs including:   Lab Results   Component Value Date    CREATININE 0.59 (L) 04/25/2025    CREATININE 0.65 03/22/2024    CREATININE 0.86 05/01/2023    BUN 12 04/25/2025    K 3.7 04/25/2025    CL 99 04/25/2025    CO2 26 04/25/2025      eGFR   Date Value Ref Range Status   04/25/2025 93 ml/min/1.73sq m Final      Lab Results   Component Value Date    AZY9DHRDNOMK 1.221 02/28/2025      No results found for: \"FREET4\", \"T3FREE\", \"TSI\", \"ANTITPOAB\"   Lab Results   Component Value Date    GJIL14CFBTBX 34.2 04/25/2025    CUTY66UBFZCM 32.2 05/01/2023    CPCX55JQELGD 24.6 (L) 07/07/2020      Radiology Results Review: I have reviewed the following images/report studies in PACS: DXA bone density spine hip and pelvis  Result Date: 4/29/2025  Impression     1. Osteoporosis. Based on the right radius     2.  Since a DXA study from 4/12/2023, there has been: No statistically significant change in bone mineral density at any of the evaluated skeletal sites.       3.  The 10 year risk of hip fracture is 1.6% with the 10 year risk of major osteoporotic fracture being 10% as calculated by the [employer] fracture risk assessment tool (FRAX, which is based on data generated by the WHO Collaborating [place]  for Metabolic Bone Diseases).     4.  The current Bone Health and Osteoporosis Foundation (BHOF) guidelines recommend treating patients with a T-score of -2.5 or less in the lumbar spine or hips, or in post-menopausal women and men over the age of 50 with low bone mass (osteopenia; T-score between -1.0 and -2.5) and a FRAX 10 year risk score of > 3% for hip fracture and/or > 20% for major osteoporosis-related fracture (clinical vertebral, hip, forearm, or proximal humerus).     5.  The BHOF recommends follow-up DXA in 1-2 years after initiating or changing medical therapy for osteoporosis " and at appropriate intervals thereafter, according to clinical circumstances. More frequent BMD testing may be warranted in higher-risk individuals (multiple fractures, older age, very low BMD). Less frequent BMD testing is warranted as follow-up in patients with initial T-scores in the normal or slightly below normal range (osteopenia) and for patients who have remained fracture free on  treatment.         The FRAX algorithm has certain limitations: -FRAX has not been validated in patients currently or previously treated with pharmacotherapy for osteoporosis.  In such patients, clinical judgment must be exercised in interpreting FRAX scores. -Prior hip, vertebral and humeral fragility fractures appear to confer greater risk of subsequent fracture than fractures at other sites (this is especially true for individuals with severe vertebral fractures), but quantification of this incremental risk is not possible with FRAX. -FRAX underestimates fracture risk in patients with history of multiple fragility fractures. -FRAX may underestimate fracture risk in patients with history of frequent falls. -It is not appropriate to use FRAX to monitor treatment response.         WHO CLASSIFICATION: Normal (a T-score of -1.0 or higher) Low bone mineral density (a T-score of less than -1.0 but higher than -2.5) Osteoporosis (a T-score of -2.5 or less) Severe osteoporosis (a T-score of -2.5 or less with a fragility fracture)         LEAST SIGNIFICANT CHANGE (AT 95% C.I): Lumbar spine: 0.025 g/cm2; 2.8% Total hip: 0.025 g/cm2; 3.7% Forearm: 0.012 g/cm2; 1.9%        There are no Patient Instructions on file for this visit.    Discussed with the patient and all questioned fully answered. She will call me if any problems arise.

## 2025-06-16 DIAGNOSIS — G47.00 INSOMNIA, UNSPECIFIED TYPE: ICD-10-CM

## 2025-06-16 RX ORDER — ZOLPIDEM TARTRATE 10 MG/1
10 TABLET ORAL
Qty: 30 TABLET | Refills: 0 | Status: SHIPPED | OUTPATIENT
Start: 2025-06-16

## 2025-06-17 ENCOUNTER — TELEPHONE (OUTPATIENT)
Dept: ENDOCRINOLOGY | Facility: HOSPITAL | Age: 70
End: 2025-06-17

## 2025-06-17 NOTE — TELEPHONE ENCOUNTER
Benefits we run and received for the Prolia per the provider's request.    Per the benefits the patient will have a 20% OOP cost and no deductible.  A prior auth is needed and will be sent to the prior auth and the patient will be called when prior auth notification is received.

## 2025-06-18 NOTE — TELEPHONE ENCOUNTER
[employer] called asking if the Prolia will be supplied by office, informed them it will be from the pharmacy.  No further action needed

## 2025-06-20 NOTE — TELEPHONE ENCOUNTER
I called the patient and left her a message that her benefits were completed as well as the prior auth being approved.  She is to call the office to discuss and set up an appointment.    Benefits:  20% out of pocket cost, no deductible, no adminstration cost.  Estimated OOP would be $300-400.    Prior auth  Auth approved for Prolia () through Medical/Pharmacy. Auth# [id number] valid 4/18/2025-6/17/2026. Qty 2. RF

## 2025-06-24 DIAGNOSIS — M81.0 AGE-RELATED OSTEOPOROSIS WITHOUT CURRENT PATHOLOGICAL FRACTURE: Primary | ICD-10-CM

## 2025-06-24 RX ORDER — DENOSUMAB 60 MG/ML
60 INJECTION SUBCUTANEOUS ONCE
Qty: 1 ML | Refills: 1 | Status: SHIPPED | OUTPATIENT
Start: 2025-06-24 | End: 2025-06-24

## 2025-06-24 NOTE — TELEPHONE ENCOUNTER
Patient was updated on the above message. She is asking if the prolia shot will maintain her levels or will it improve them?

## 2025-06-27 ENCOUNTER — TELEPHONE (OUTPATIENT)
Age: 70
End: 2025-06-27

## 2025-06-27 NOTE — TELEPHONE ENCOUNTER
Patient called asking if Prolia increases bone strength. Made aware yes, Prolia helps to increase bone density and strength to reduce the risk of fractures. Patient verbalized understanding.

## 2025-06-30 ENCOUNTER — APPOINTMENT (OUTPATIENT)
Dept: LAB | Facility: HOSPITAL | Age: 70
End: 2025-06-30
Payer: COMMERCIAL

## 2025-06-30 DIAGNOSIS — M81.0 AGE-RELATED OSTEOPOROSIS WITHOUT CURRENT PATHOLOGICAL FRACTURE: ICD-10-CM

## 2025-06-30 DIAGNOSIS — S52.602B FRACTURE OF DISTAL RADIUS AND ULNA, LEFT, OPEN TYPE I OR II, INITIAL ENCOUNTER: ICD-10-CM

## 2025-06-30 DIAGNOSIS — S52.502B FRACTURE OF DISTAL RADIUS AND ULNA, LEFT, OPEN TYPE I OR II, INITIAL ENCOUNTER: ICD-10-CM

## 2025-06-30 LAB
25(OH)D3 SERPL-MCNC: 28.2 NG/ML (ref 30–100)
ALBUMIN SERPL BCG-MCNC: 4.3 G/DL (ref 3.5–5)
ALP SERPL-CCNC: 99 U/L (ref 34–104)
ALT SERPL W P-5'-P-CCNC: 15 U/L (ref 7–52)
ANION GAP SERPL CALCULATED.3IONS-SCNC: 11 MMOL/L (ref 4–13)
AST SERPL W P-5'-P-CCNC: 19 U/L (ref 13–39)
BILIRUB SERPL-MCNC: 0.5 MG/DL (ref 0.2–1)
BUN SERPL-MCNC: 13 MG/DL (ref 5–25)
CALCIUM SERPL-MCNC: 9.4 MG/DL (ref 8.4–10.2)
CHLORIDE SERPL-SCNC: 100 MMOL/L (ref 96–108)
CO2 SERPL-SCNC: 25 MMOL/L (ref 21–32)
CREAT SERPL-MCNC: 0.67 MG/DL (ref 0.6–1.3)
GFR SERPL CREATININE-BSD FRML MDRD: 89 ML/MIN/1.73SQ M
GLUCOSE SERPL-MCNC: 90 MG/DL (ref 65–140)
POTASSIUM SERPL-SCNC: 4.2 MMOL/L (ref 3.5–5.3)
PROT SERPL-MCNC: 7.7 G/DL (ref 6.4–8.4)
SODIUM SERPL-SCNC: 136 MMOL/L (ref 135–147)

## 2025-06-30 PROCEDURE — 36415 COLL VENOUS BLD VENIPUNCTURE: CPT

## 2025-06-30 PROCEDURE — 82306 VITAMIN D 25 HYDROXY: CPT

## 2025-06-30 PROCEDURE — 80053 COMPREHEN METABOLIC PANEL: CPT

## 2025-07-07 DIAGNOSIS — J32.9 CHRONIC SINUSITIS, UNSPECIFIED LOCATION: ICD-10-CM

## 2025-07-07 RX ORDER — FLUTICASONE PROPIONATE 50 MCG
1 SPRAY, SUSPENSION (ML) NASAL DAILY
Qty: 16 G | Refills: 5 | Status: SHIPPED | OUTPATIENT
Start: 2025-07-07

## 2025-07-07 NOTE — TELEPHONE ENCOUNTER
Not a duplicate change in pharmacy   Needs provider route information      Reason for call:   [ ] Refill   [ ] Prior Auth  [ ] Other:     Office:   [ ] PCP/Provider - Sravanthi Gonzalez DO-[facility] [id number]       Medication: fluticasone (FLONASE) 50 mcg/act nasal spray     Dose/Frequency: instill 1 spray into each nostril once daily    Quantity: 16g     Pharmacy: [facility]   Does the patient have enough for 3 days?   [ ] Yes   [ ] No - Send as HP to POD

## 2025-07-10 ENCOUNTER — HOSPITAL ENCOUNTER (OUTPATIENT)
Dept: NON INVASIVE DIAGNOSTICS | Age: 70
Discharge: HOME | End: 2025-07-10
Attending: INTERNAL MEDICINE
Payer: COMMERCIAL

## 2025-07-10 VITALS
WEIGHT: 201 LBS | HEART RATE: 94 BPM | DIASTOLIC BLOOD PRESSURE: 70 MMHG | BODY MASS INDEX: 31.55 KG/M2 | SYSTOLIC BLOOD PRESSURE: 120 MMHG | HEIGHT: 67 IN

## 2025-07-10 DIAGNOSIS — R06.02 SHORTNESS OF BREATH: ICD-10-CM

## 2025-07-10 DIAGNOSIS — F41.9 ANXIETY: ICD-10-CM

## 2025-07-10 DIAGNOSIS — R07.89 CHEST PRESSURE: ICD-10-CM

## 2025-07-10 LAB
AORTIC ROOT: 3.4 CM
ASCENDING AORTA: 3.8 CM
BSA FOR ECHO PROCEDURE: 2.03 M2
E WAVE DECELERATION TIME: 242 MS
E/A RATIO: 0.7
FRACTIONAL SHORTENING: 29 (ref 28–44)
GLOBAL LONGITUIDAL STRAIN: -15 %
INTERVENTRICULAR SEPTUM IN DIASTOLE (PARASTERNAL SHORT AXIS VIEW): 1.2 CM
INTERVENTRICULAR SEPTUM: 1.2 CM (ref 0.6–1.1)
LAAS-AP2: 17.9 CM2
LAAS-AP4: 16.5 CM2
LEFT ATRIUM SIZE: 3.8 CM
LEFT ATRIUM VOLUME (MOD BIPLANE): 47 ML
LEFT ATRIUM VOLUME INDEX (MOD BIPLANE): 23.2 ML/M2
LEFT INTERNAL DIMENSION IN SYSTOLE: 2.5 CM (ref 2.1–4)
LEFT VENTRICLE DIASTOLIC VOLUME (MOD BIPLANE): 95 ML
LEFT VENTRICLE DIASTOLIC VOLUME INDEX (MOD BIPLANE): 46.8 ML/M2
LEFT VENTRICLE SYSTOLIC VOLUME (MOD BIPLANE): 40 ML
LEFT VENTRICLE SYSTOLIC VOLUME INDEX (MOD BIPLANE): 19.7 ML/M2
LEFT VENTRICULAR INTERNAL DIMENSION IN DIASTOLE: 3.5 CM (ref 3.5–6)
LEFT VENTRICULAR POSTERIOR WALL IN END DIASTOLE: 1.4 CM
LEFT VENTRICULAR STROKE VOLUME: 27 ML
LV EF BIPLANE MOD: 58 %
LV EF US.2D.A4C+ESTIMATED: 61 %
LVSV (TEICH): 27 ML
MAX HR PERCENT: 108 %
MAX HR: 155 BPM
MV E'TISSUE VEL-LAT: 8 CM/S
MV E'TISSUE VEL-SEP: 8 CM/S
MV PEAK A VEL: 0.86 M/S
MV PEAK E VEL: 60 CM/S
MV STENOSIS PRESSURE HALF TIME: 70 MS
MV VALVE AREA P 1/2 METHOD: 3.14
RATE PRESSURE PRODUCT: NORMAL
RIGHT ATRIAL 2D VOLUME: 35 ML
RIGHT ATRIUM AREA SYSTOLE A4C: 14.5 CM2
RIGHT VENTRICLE ID DIMENSION: 3.3 CM
SL CV LEFT ATRIUM LENGTH A2C: 4.9 CM
SL CV LV EF: 60
SL CV LV EF: 60
SL CV PED ECHO LEFT VENTRICLE DIASTOLIC VOLUME (MOD BIPLANE) 2D: 50 ML
SL CV PED ECHO LEFT VENTRICLE SYSTOLIC VOLUME (MOD BIPLANE) 2D: 22 ML
SL CV STRESS STAGE REACHED: 3
STRESS ANGINA INDEX: 0
STRESS BASELINE HR: 97 BPM
STRESS PEAK HR: 155 BPM
STRESS POST ESTIMATED WORKLOAD: 9.3 METS
STRESS POST EXERCISE DUR MIN: 6 MIN
STRESS POST EXERCISE DUR SEC: 45 SEC
STRESS POST PEAK BP: 164 MMHG
TR MAX PG: 25 MMHG
TR PEAK VELOCITY: 2.5 M/S
TRICUSPID ANNULAR PLANE SYSTOLIC EXCURSION: 1.7 CM
TRICUSPID VALVE PEAK REGURGITATION VELOCITY: 2.52 M/S

## 2025-07-10 PROCEDURE — 93306 TTE W/DOPPLER COMPLETE: CPT

## 2025-07-10 PROCEDURE — 93356 MYOCRD STRAIN IMG SPCKL TRCK: CPT

## 2025-07-10 PROCEDURE — 93356 MYOCRD STRAIN IMG SPCKL TRCK: CPT | Performed by: INTERNAL MEDICINE

## 2025-07-10 PROCEDURE — 93350 STRESS TTE ONLY: CPT

## 2025-07-10 PROCEDURE — 93350 STRESS TTE ONLY: CPT | Performed by: INTERNAL MEDICINE

## 2025-07-10 PROCEDURE — 93306 TTE W/DOPPLER COMPLETE: CPT | Performed by: INTERNAL MEDICINE

## 2025-07-11 RX ORDER — LORAZEPAM 1 MG/1
1 TABLET ORAL 2 TIMES DAILY PRN
Qty: 60 TABLET | Refills: 0 | Status: SHIPPED | OUTPATIENT
Start: 2025-07-11

## 2025-07-16 LAB
CHEST PAIN STATEMENT: NORMAL
MAX DIASTOLIC BP: 90 MMHG
MAX PREDICTED HEART RATE: 150 BPM
PROTOCOL NAME: NORMAL
REASON FOR TERMINATION: NORMAL
STRESS POST EXERCISE DUR MIN: 6 MIN
STRESS POST EXERCISE DUR SEC: 45 SEC
STRESS POST PEAK HR: 155 BPM
STRESS POST PEAK SYSTOLIC BP: 164 MMHG
TARGET HR FORMULA: NORMAL
TEST INDICATION: NORMAL

## 2025-07-17 DIAGNOSIS — G47.00 INSOMNIA, UNSPECIFIED TYPE: ICD-10-CM

## 2025-07-17 RX ORDER — ZOLPIDEM TARTRATE 10 MG/1
10 TABLET ORAL
Qty: 30 TABLET | Refills: 0 | Status: SHIPPED | OUTPATIENT
Start: 2025-07-17

## 2025-08-04 ENCOUNTER — OFFICE VISIT (OUTPATIENT)
Dept: PODIATRY | Facility: CLINIC | Age: 70
End: 2025-08-04
Payer: COMMERCIAL

## 2025-08-04 PROBLEM — S92.344A CLOSED NONDISPLACED FRACTURE OF FOURTH METATARSAL BONE OF RIGHT FOOT: Status: ACTIVE | Noted: 2025-08-04

## (undated) DEVICE — CHLORAPREP HI-LITE 26ML ORANGE

## (undated) DEVICE — PENCIL ELECTROSURG E-Z CLEAN -0035H

## (undated) DEVICE — DRAPE C-ARM X-RAY

## (undated) DEVICE — LIGHT HANDLE COVER SLEEVE DISP BLUE STELLAR

## (undated) DEVICE — DRAPE EQUIPMENT RF WAND

## (undated) DEVICE — NEEDLE 25G X 1 1/2

## (undated) DEVICE — GLOVE SRG BIOGEL 7

## (undated) DEVICE — SHEATH, GUIDE, SAVI SCOUT®: Brand: SAVI SCOUT®

## (undated) DEVICE — SUT MONOCRYL 4-0 PS-2 18 IN Y496G

## (undated) DEVICE — GLOVE INDICATOR PI UNDERGLOVE SZ 9 BLUE

## (undated) DEVICE — SMOKE EVACUATION TUBING WITH 8 IN INTEGRAL WAND AND SPONGE GUARD: Brand: BUFFALO FILTER

## (undated) DEVICE — 3M™ STERI-STRIP™ REINFORCED ADHESIVE SKIN CLOSURES, R1547, 1/2 IN X 4 IN (12 MM X 100 MM), 6 STRIPS/ENVELOPE: Brand: 3M™ STERI-STRIP™

## (undated) DEVICE — DRILL TWIST 2.3MM

## (undated) DEVICE — CYSTO TUBING TUR Y IRRIGATION

## (undated) DEVICE — DRAPE PROBE NEO-PROBE/ULTRASOUND

## (undated) DEVICE — INTENDED FOR TISSUE SEPARATION, AND OTHER PROCEDURES THAT REQUIRE A SHARP SURGICAL BLADE TO PUNCTURE OR CUT.: Brand: BARD-PARKER SAFETY BLADES SIZE 15, STERILE

## (undated) DEVICE — TUBING SUCTION 5MM X 12 FT

## (undated) DEVICE — STERILE BETHLEHEM PLASTIC HAND: Brand: CARDINAL HEALTH

## (undated) DEVICE — DRILL 1.8 X 90MM AKA

## (undated) DEVICE — BETHLEHEM UNIVERSAL MINOR GEN: Brand: CARDINAL HEALTH

## (undated) DEVICE — CUFF TOURNIQUET 18 X 4 IN QUICK CONNECT DISP 1 BLADDER

## (undated) DEVICE — VIAL DECANTER

## (undated) DEVICE — GLOVE INDICATOR PI UNDERGLOVE SZ 8 BLUE

## (undated) DEVICE — GLOVE INDICATOR PI UNDERGLOVE SZ 7.5 BLUE

## (undated) DEVICE — SUT VICRYL 3-0 SH 27 IN J416H

## (undated) DEVICE — MEDI-VAC YANK SUCT HNDL W/TPRD BULBOUS TIP: Brand: CARDINAL HEALTH

## (undated) DEVICE — GLOVE SRG BIOGEL 9

## (undated) DEVICE — GLOVE INDICATOR PI UNDERGLOVE SZ 7 BLUE

## (undated) DEVICE — K-WIRE FIXATION 1.1 X 100MM SS
Type: IMPLANTABLE DEVICE | Site: WRIST | Status: NON-FUNCTIONAL
Removed: 2023-02-24

## (undated) DEVICE — SYRINGE 1ML TB 26G X 3/8 SAFETY

## (undated) DEVICE — SUT ETHILON 3-0 PS-1 18 IN 1663G